# Patient Record
Sex: MALE | Race: WHITE | NOT HISPANIC OR LATINO | Employment: FULL TIME | URBAN - METROPOLITAN AREA
[De-identification: names, ages, dates, MRNs, and addresses within clinical notes are randomized per-mention and may not be internally consistent; named-entity substitution may affect disease eponyms.]

---

## 2017-11-14 ENCOUNTER — TRANSFERRED RECORDS (OUTPATIENT)
Dept: HEALTH INFORMATION MANAGEMENT | Facility: CLINIC | Age: 37
End: 2017-11-14

## 2017-11-20 ENCOUNTER — TRANSFERRED RECORDS (OUTPATIENT)
Dept: HEALTH INFORMATION MANAGEMENT | Facility: CLINIC | Age: 37
End: 2017-11-20

## 2018-05-10 ENCOUNTER — TRANSFERRED RECORDS (OUTPATIENT)
Dept: HEALTH INFORMATION MANAGEMENT | Facility: CLINIC | Age: 38
End: 2018-05-10

## 2018-08-03 ENCOUNTER — MEDICAL CORRESPONDENCE (OUTPATIENT)
Dept: HEALTH INFORMATION MANAGEMENT | Facility: CLINIC | Age: 38
End: 2018-08-03

## 2018-08-30 DIAGNOSIS — M51.369 DDD (DEGENERATIVE DISC DISEASE), LUMBAR: Primary | ICD-10-CM

## 2018-09-05 ENCOUNTER — PRE VISIT (OUTPATIENT)
Dept: ORTHOPEDICS | Facility: CLINIC | Age: 38
End: 2018-09-05

## 2018-09-05 NOTE — TELEPHONE ENCOUNTER
FUTURE VISIT INFORMATION      FUTURE VISIT INFORMATION:    Date: 9/6    Time: 7:00    [place] [facility] [place]  REFERRAL INFORMATION:    Referring provider:  Cassius Mariscal    Referring providers clinic:  [facility]    Reason for visit/diagnosis  chronic lumbar disc disease    RECORDS REQUESTED FROM:       Clinic name Comments Records Status Imaging Status   [facility] Requested 9/5                                     RECORDS STATUS

## 2018-09-06 ENCOUNTER — OFFICE VISIT (OUTPATIENT)
Dept: ORTHOPEDICS | Facility: CLINIC | Age: 38
End: 2018-09-06
Payer: COMMERCIAL

## 2018-09-06 ENCOUNTER — RADIANT APPOINTMENT (OUTPATIENT)
Dept: GENERAL RADIOLOGY | Facility: CLINIC | Age: 38
End: 2018-09-06
Attending: ORTHOPAEDIC SURGERY
Payer: COMMERCIAL

## 2018-09-06 VITALS — WEIGHT: 186 LBS | BODY MASS INDEX: 26.63 KG/M2 | HEIGHT: 70 IN

## 2018-09-06 DIAGNOSIS — M51.369 DDD (DEGENERATIVE DISC DISEASE), LUMBAR: ICD-10-CM

## 2018-09-06 DIAGNOSIS — M47.28 OSTEOARTHRITIS OF SPINE WITH RADICULOPATHY, SACRAL AND SACROCOCCYGEAL REGION: ICD-10-CM

## 2018-09-06 DIAGNOSIS — M51.379 DEGENERATION OF LUMBAR OR LUMBOSACRAL INTERVERTEBRAL DISC: Primary | ICD-10-CM

## 2018-09-06 RX ORDER — HYDROCODONE BITARTRATE AND ACETAMINOPHEN 5; 325 MG/1; MG/1
1-2 TABLET ORAL PRN
COMMUNITY

## 2018-09-06 ASSESSMENT — ENCOUNTER SYMPTOMS
BACK PAIN: 1
JOINT SWELLING: 0
INSOMNIA: 1
NIGHT SWEATS: 0
MYALGIAS: 1
INCREASED ENERGY: 0
POLYPHAGIA: 0
DECREASED CONCENTRATION: 1
CHILLS: 0
NERVOUS/ANXIOUS: 1
FATIGUE: 1
MUSCLE WEAKNESS: 1
ALTERED TEMPERATURE REGULATION: 0
NECK PAIN: 1
MUSCLE CRAMPS: 0
ARTHRALGIAS: 0
STIFFNESS: 0
PANIC: 0
DECREASED APPETITE: 0
POLYDIPSIA: 0
DEPRESSION: 0
WEIGHT GAIN: 0
FEVER: 0
HALLUCINATIONS: 0
WEIGHT LOSS: 0

## 2018-09-06 ASSESSMENT — PATIENT HEALTH QUESTIONNAIRE - PHQ9
SUM OF ALL RESPONSES TO PHQ QUESTIONS 1-9: 8
10. IF YOU CHECKED OFF ANY PROBLEMS, HOW DIFFICULT HAVE THESE PROBLEMS MADE IT FOR YOU TO DO YOUR WORK, TAKE CARE OF THINGS AT HOME, OR GET ALONG WITH OTHER PEOPLE: SOMEWHAT DIFFICULT
SUM OF ALL RESPONSES TO PHQ QUESTIONS 1-9: 8

## 2018-09-06 NOTE — PROGRESS NOTES
REASON FOR CONSULTATION: Consult (Chronic lumbar disc disease.)     REFERRING PHYSICIAN: Cassius Josue   PCP:No Ref-Primary, Physician    History of Present Illness:  37/m, seen for CLBP.  Onset 11 yrs ago; was cutting down trees, hauling logs - felt pop in his back followed by LBP.  Prior to incident, no back issues.  Since then, chronic LBP, never went away.    75% back 25% R leg (posterior thigh, stops at knee).  Also with back stiffness, and limp.    Worse: prolonged positioning, going up stairs, activity.  Better: Prefers to stand and move around rather than sit; changing positions.    Previous treatment:   LEELA's - no relief.  PT (last one 5-6 yrs ago) - no relief.  Chiropractic - no relief.  TENS - no relief.  MBB (some relief) and RFA - no relief.    Currently takes hydrocodone 5/325 mg, 4-5 tabs/day (managed c/o PCP Dr. Josue).  Alleve prn  Voltaren prn    Very unhappy with current sxs.  Willing to consider surgery.  Has not had previous surgical visit because was afraid of surgery.  Limits QOL.  Many activities he used to be able to do that he can't do anymore, although still able to work full time as .  Cannot walk dogs for miles/day like he used to.      Oswestry (LAURYN) Questionnaire    OSWESTRY DISABILITY INDEX 9/6/2018   Count 10   Sum 22   Oswestry Score (%) 44        Visual Analog Pain Scale  Back Pain Scale 0-10: 4  Right leg pain: 0  Left leg pain: 0    PROMIS-10 Scores  Global Mental Health Score: (P) 7  Global Physical Health Score: (P) 12  PROMIS TOTAL - SUBSCORES: (P) 19    ROS:  A 12-point review of systems was completed and is negative except for otherwise noted above in the history of present illness.    Med Hx:  No past medical history on file.    Surg Hx:  No past surgical history on file.    Allergies:  No Known Allergies    Meds:  Current Outpatient Prescriptions   Medication     diclofenac (VOLTAREN) 1 % GEL topical gel     HYDROcodone-acetaminophen (NORCO) 5-325 MG per  "tablet     Naproxen Sodium (ALEVE PO)     No current facility-administered medications for this visit.        Fam Hx:  No family history on file.    P/S Hx:  Social History   Substance Use Topics     Smoking status: Not on file     Smokeless tobacco: Not on file     Alcohol use Not on file     Works full time as ; works thru pain.  , lives with wife; no kids.  Smokes 1 ppd x 25-30 yrs.  EtOH 2x/month, approx 2 beers.      Physical Exam:  Very pleasant, healthy appearing, alert, oriented x 3, cooperative.  Normal mood and affect.  Not in cardiorespiratory distress.  Ht 1.79 m (5' 10.47\")  Wt 84.4 kg (186 lb)  BMI 26.33 kg/m2  Hunched forward posture; stiff upon getting up from chair.  (+) antalgic gait; no assistive device.  Says his limp persists even after he loosens up.  Back: no deformity, no skin lesions or surgical scars.  Localizes pain at R lumbosacral area.  (+) lumbosacral paraspinal tenderness.  (-) PSIS tenderness.  Limited ROM.    Neuro Exam:  Motor: 5/5 strength for all muscle groups in both LE's.  Sensory:  Intact to light touch in both LE's.   Reflexes:  Knee 2+ bilat.  Ankle 1+ bilat.  (-) Babinski, (-) clonus.    Lower Extremity:  Equal leg lengths, full pulses, (-) atrophy / asymmetry.  Full painless passive knee and ankle motion on L.  On R side, doing so produces LBP (but not knee/ankle pain).  Straight leg raise: (+) LBP on right but no leg pain, (-) left.  Hip impingement: (+) LBP on right but no groin pain, (-) left.  ANITA/Mateo's: (++) right, (-) left.      Sacroiliac Joint Tests:      TEST RIGHT LEFT   PSIS tenderness - -   Johanne finger sign - -   ANITA/Mateo ++ -   Thigh thrust + -           Gapping -   Compression -   Sacral thrust -   Gaenslen's -           Imaging:  EOS lumbar ap-lat x-ray today shows some asymmetry between R and L hemipelvis, possibly due to positioning (rotation) but cannot exclude pelvic deformity (old fracture?; however patient denies old " pelvic injury).  No significant lumbar sagittal malalignment, although noted to have pelvic anteversion (negative PT), with hunched forward posture.  Sagittal measurments as follows:  LL 60  L4-S1 37, ideal 37  PI 56  PI-LL mismatch -4  PT -3    Lumbar MRI (Nov 2017) shows degenerative disc changes with disc space narrowing and R sided disc protrusion/HNP L5-S1, with likely impingement of traversing R S1 nerve root.    Impression:   - Degenerative disc disease L5-S1, with chronic discogenic LBP.  - R posterolateral disc herniation L5-S1, with R S1 radiculopathy.  - Chronic smoking (1 ppd x 25-30 yrs).  - Chronic opioid dependence (currently hydrocodone 5/325, ~ 5 tabs/day).      Plan:   Had good long discussion with patient and wife.  Two possible pain generators: (1) L5-S1 disc; (2) R SIJ.  Between the two, the former is more likely, given that there is objective MRI evidence of L5-S1 disc degeneration with R sided disc protrusion and patient's symptoms are predominantly on R side.   However, if he would like to be more certain, discussed further workup in form of discogram and/or R SIJ injection.  Discussed that these tests are not fool-proof and that there is some debate/controversy in regards to their usefulness as well.    Discussed treatment options, including observation/living with his pain, further nonop, and surgery.  Discussed surgical treatment options: L5-S1 fusion, diskectomy, SIJ fusion.  Since his pain is back>leg, and he would be unhappy if only leg symptoms were improved, recommend fusion surgery.    Strongly advised smoking cessation prior to surgery.  Also advised to wean down opioid intake.  He will try to work on those with his PCP Dr. Josue.    - Surg request placed: ALIF L5-S1; Perc pedicle screw fixation L5-S1; MIS R diskectomy L5-S1.  - PAC  - Will need vascular surgeon for Part 1.    RTC 1-2 wks prior to surgery, with full spine EOS ap-lat; lumbar CT (for preop planning; order placed),  and will enroll in DegenPro study (c/o Fide Madrid ).    All questions and concerns were answered to the patient's apparent satisfaction before leaving the clinic.     Total visit time > 60 mins, > 50% counseling and coordination of care.    Respectfully,    Charlie Nixon MD    Orthopaedic Spine Surgery  Dept Orthopaedic Surgery, [facility]  [phone] office, [phone] pager  [email or web address]    Answers for HPI/ROS submitted by the patient on 9/6/2018   General Symptoms: Yes  Skin Symptoms: No  HENT Symptoms: No  EYE SYMPTOMS: No  HEART SYMPTOMS: No  LUNG SYMPTOMS: No  INTESTINAL SYMPTOMS: No  URINARY SYMPTOMS: No  REPRODUCTIVE SYMPTOMS: No  SKELETAL SYMPTOMS: Yes  BLOOD SYMPTOMS: No  NERVOUS SYSTEM SYMPTOMS: No  MENTAL HEALTH SYMPTOMS: Yes  Fever: No  Loss of appetite: No  Weight loss: No  Weight gain: No  Fatigue: Yes  Night sweats: No  Chills: No  Increased stress: Yes  Excessive hunger: No  Excessive thirst: No  Feeling hot or cold when others believe the temperature is normal: No  Loss of height: No  Post-operative complications: No  Surgical site pain: No  Hallucinations: No  Change in or Loss of Energy: No  Hyperactivity: No  Confusion: No  Back pain: Yes  Muscle aches: Yes  Neck pain: Yes  Swollen joints: No  Joint pain: No  Bone pain: No  Muscle cramps: No  Muscle weakness: Yes  Joint stiffness: No  Bone fracture: No  Nervous or Anxious: Yes  Depression: No  Trouble sleeping: Yes  Trouble thinking or concentrating: Yes  Mood changes: Yes  Panic attacks: No  PHQ-2 Score: 4  If you checked off any problems, how difficult have these problems made it for you to do your work, take care of things at home, or get along with other people?: Somewhat difficult  PHQ9 TOTAL SCORE: 8

## 2018-09-06 NOTE — LETTER
Verification of Appointment  2018     Seen today: yes    Patient:  Lee Mckenzie  :   1980  MRN:     [id number]  Physician: GENARO KHALIL      Patient was seen today at Orthopaedic Spine Surgery clinic for chronic low back pain.        Electronically signed by Genaro Khalli MD

## 2018-09-06 NOTE — NURSING NOTE
"Reason For Visit:   Chief Complaint   Patient presents with     Consult     Chronic lumbar disc disease.       Primary MD: No Ref-Primary, Physician      ?  No  Occupation   Currently working? Yes.  Work status?  Full time.  Date of injury: None.  Date of surgery:None  Smoker: Yes    Ht 1.79 m (5' 10.47\")  Wt 84.4 kg (186 lb)  BMI 26.33 kg/m2    Pain Assessment  Patient Currently in Pain: Yes  0-10 Pain Scale: 5  Primary Pain Location: Back  Pain Descriptors: Constant, Aching    Oswestry (LAURYN) Questionnaire    OSWESTRY DISABILITY INDEX 9/6/2018   Count 10   Sum 22   Oswestry Score (%) 44            Visual Analog Pain Scale  Back Pain Scale 0-10: 4  Right leg pain: 0  Left leg pain: 0    Promis 10 Assessment    PROMIS 10 9/6/2018   In general, would you say your health is: Good   In general, would you say your quality of life is: Poor   In general, how would you rate your physical health? Good   In general, how would you rate your mental health, including your mood and your ability to think? Fair   In general, how would you rate your satisfaction with your social activities and relationships? Fair   In general, please rate how well you carry out your usual social activities and roles Good   To what extent are you able to carry out your everyday physical activities such as walking, climbing stairs, carrying groceries, or moving a chair? Moderately   How often have you been bothered by emotional problems such as feeling anxious, depressed or irritable? Often   How would you rate your fatigue on average? Moderate   How would you rate your pain on average?   0 = No Pain  to  10 = Worst Imaginable Pain 6   In general, would you say your health is: 3   In general, would you say your quality of life is: 1   In general, how would you rate your physical health? 3   In general, how would you rate your mental health, including your mood and your ability to think? 2   In general, how would you rate your " satisfaction with your social activities and relationships? 2   In general, please rate how well you carry out your usual social activities and roles. (This includes activities at home, at work and in your community, and responsibilities as a parent, child, spouse, employee, friend, etc.) 3   To what extent are you able to carry out your everyday physical activities such as walking, climbing stairs, carrying groceries, or moving a chair? 3   In the past 7 days, how often have you been bothered by emotional problems such as feeling anxious, depressed, or irritable? 4   In the past 7 days, how would you rate your fatigue on average? 3   In the past 7 days, how would you rate your pain on average, where 0 means no pain, and 10 means worst imaginable pain? 6   Global Mental Health Score 7   Global Physical Health Score 12   PROMIS TOTAL - SUBSCORES 19                Mirna Vidales LPN

## 2018-09-06 NOTE — MR AVS SNAPSHOT
After Visit Summary   9/6/2018    Lee Mckenzie    MRN: [id number]           Patient Information     Date Of Birth          1980        Visit Information        Provider Department      9/6/2018 7:00 AM Charlie Nixon [facility]        Today's Diagnoses     Degeneration of lumbar or lumbosacral intervertebral disc    -  1    Osteoarthritis of spine with radiculopathy, sacral and sacrococcygeal region           Follow-ups after your visit        Additional Services     PAC Visit Referral (For [facility] Only)       Does this visit require an Anesthesia consult?    Will be scheduled for AP fusion L5-S1.  Chronic smoker 1 ppd x 25-30 yrs.  Hydrocodone 5/325 mg, 5 tabs/day x > 2 yrs.    H&P done by:  N/A and PAC      Please be aware that coverage of these services is subject to the terms and limitations of your health insurance plan.  Call member services at your health plan with any benefit or coverage questions.      Please bring the following to your appointment:  >>   Any x-rays, CTs or MRIs which have been performed.  Contact the facility where they were done to arrange for  prior to your scheduled appointment.  Any new CT, MRI or other procedures ordered by your specialist must be performed at a [place] facility or coordinated by your clinic's referral office.    >>   List of current medications  >>   This referral request   >>   Any documents/labs given to you for this referral                  Future tests that were ordered for you today     Open Future Orders        Priority Expected Expires Ordered    CT lumbar spine without contrast Routine  9/6/2019 9/6/2018            Who to contact     Please call your clinic at [phone] to:    Ask questions about your health    Make or cancel appointments    Discuss your medicines    Learn about your test results    Speak to your doctor            Additional Information About Your Visit        MyChart Information   "   Topio is an electronic gateway that provides easy, online access to your medical records. With Topio, you can request a clinic appointment, read your test results, renew a prescription or communicate with your care team.     To sign up for Topio visit the website at [email or web address]   You will be asked to enter the access code listed below, as well as some personal information. Please follow the directions to create your username and password.     Your access code is: [email or web address]  Expires: 2018  6:31 AM     Your access code will  in 90 days. If you need help or a new code, please contact your [facility] or call [phone] for assistance.        Care EveryWhere ID     This is your Care EveryWhere ID. This could be used by other organizations to access your [facility] medical records  [id number]        Your Vitals Were     Height BMI (Body Mass Index)                1.79 m (5' 10.47\") 26.33 kg/m2           Blood Pressure from Last 3 Encounters:   No data found for BP    Weight from Last 3 Encounters:   18 84.4 kg (186 lb)              We Performed the Following     PAC Visit Referral (For [facility] Only)     Vesta-Operative Worksheet       Information about OPIOIDS     PRESCRIPTION OPIOIDS: WHAT YOU NEED TO KNOW   We gave you an opioid (narcotic) pain medicine. It is important to manage your pain, but opioids are not always the best choice. You should first try all the other options your care team gave you. Take this medicine for as short a time (and as few doses) as possible.    Some activities can increase your pain, such as bandage changes or therapy sessions. It may help to take your pain medicine 30 to 60 minutes before these activities. Reduce your stress by getting enough sleep, working on hobbies you enjoy and practicing relaxation or meditation. Talk to your care team about ways to manage your pain beyond prescription opioids.    These " medicines have risks:    DO NOT drive when on new or higher doses of pain medicine. These medicines can affect your alertness and reaction times, and you could be arrested for driving under the influence (DUI). If you need to use opioids long-term, talk to your care team about driving.    DO NOT operate heavy machinery    DO NOT do any other dangerous activities while taking these medicines.    DO NOT drink any alcohol while taking these medicines.     If the opioid prescribed includes acetaminophen, DO NOT take with any other medicines that contain acetaminophen. Read all labels carefully. Look for the word  acetaminophen  or  Tylenol.  Ask your pharmacist if you have questions or are unsure.    You can get addicted to pain medicines, especially if you have a history of addiction (chemical, alcohol or substance dependence). Talk to your care team about ways to reduce this risk.    All opioids tend to cause constipation. Drink plenty of water and eat foods that have a lot of fiber, such as fruits, vegetables, prune juice, apple juice and high-fiber cereal. Take a laxative (Miralax, milk of magnesia, Colace, Senna) if you don t move your bowels at least every other day. Other side effects include upset stomach, sleepiness, dizziness, throwing up, tolerance (needing more of the medicine to have the same effect), physical dependence and slowed breathing.    Store your pills in a secure place, locked if possible. We will not replace any lost or stolen medicine. If you don t finish your medicine, please throw away (dispose) as directed by your pharmacist. The [place] Pollution Control Agency has more information about safe disposal: [email or web address]         Primary Care Provider Fax #    Physician No Ref-Primary [phone]       No address on file        Equal Access to Services     THUY CEJA AH: bryant López qaybta kaalmada  velasquez oroscoelislidia la'aaeula ah. Yessi [facility] [phone].    ATENCIÓN: Si habla erika, tiene a gilliam disposición servicios gratuitos de asistencia lingüística. Jim al [phone].    We comply with applicable federal civil rights laws and [place] laws. We do not discriminate on the basis of race, color, national origin, age, disability, sex, sexual orientation, or gender identity.            Thank you!     Thank you for choosing [facility]  for your care. Our goal is always to provide you with excellent care. Hearing back from our patients is one way we can continue to improve our services. Please take a few minutes to complete the written survey that you may receive in the mail after your visit with us. Thank you!             Your Updated Medication List - Protect others around you: Learn how to safely use, store and throw away your medicines at www.disposemymeds.org.          This list is accurate as of 9/6/18  9:45 AM.  Always use your most recent med list.                   Brand Name Dispense Instructions for use Diagnosis    ALEVE PO           diclofenac 1 % Gel topical gel    VOLTAREN     Place onto the skin 4 times daily        HYDROcodone-acetaminophen 5-325 MG per tablet    NORCO     Take 1 tablet by mouth every 6 hours as needed for severe pain

## 2018-09-06 NOTE — LETTER
9/6/2018       RE: Lee Mckenzie  [address] [facility] [address]     Dear Colleague,    Thank you for referring your patient, Lee Mckenzie, to the [facility]. Please see a copy of my visit note below.    REASON FOR CONSULTATION: Consult (Chronic lumbar disc disease.)     REFERRING PHYSICIAN: Cassius Josue   PCP:No Ref-Primary, Physician    History of Present Illness:  37/m, seen for CLBP.  Onset 11 yrs ago; was cutting down trees, hauling logs - felt pop in his back followed by LBP.  Prior to incident, no back issues.  Since then, chronic LBP, never went away.    75% back 25% R leg (posterior thigh, stops at knee).  Also with back stiffness, and limp.    Worse: prolonged positioning, going up stairs, activity.  Better: Prefers to stand and move around rather than sit; changing positions.    Previous treatment:   LEELA's - no relief.  PT (last one 5-6 yrs ago) - no relief.  Chiropractic - no relief.  TENS - no relief.  MBB (some relief) and RFA - no relief.    Currently takes hydrocodone 5/325 mg, 4-5 tabs/day (managed c/o PCP Dr. Josue).  Alleve prn  Voltaren prn    Very unhappy with current sxs.  Willing to consider surgery.  Has not had previous surgical visit because was afraid of surgery.  Limits QOL.  Many activities he used to be able to do that he can't do anymore, although still able to work full time as .  Cannot walk dogs for miles/day like he used to.      Oswestry (LAURYN) Questionnaire    OSWESTRY DISABILITY INDEX 9/6/2018   Count 10   Sum 22   Oswestry Score (%) 44        Visual Analog Pain Scale  Back Pain Scale 0-10: 4  Right leg pain: 0  Left leg pain: 0    PROMIS-10 Scores  Global Mental Health Score: (P) 7  Global Physical Health Score: (P) 12  PROMIS TOTAL - SUBSCORES: (P) 19    ROS:  A 12-point review of systems was completed and is negative except for otherwise noted above in the history of present  "illness.    Med Hx:  No past medical history on file.    Surg Hx:  No past surgical history on file.    Allergies:  No Known Allergies    Meds:  Current Outpatient Prescriptions   Medication     diclofenac (VOLTAREN) 1 % GEL topical gel     HYDROcodone-acetaminophen (NORCO) 5-325 MG per tablet     Naproxen Sodium (ALEVE PO)     No current facility-administered medications for this visit.        Fam Hx:  No family history on file.    P/S Hx:  Social History   Substance Use Topics     Smoking status: Not on file     Smokeless tobacco: Not on file     Alcohol use Not on file     Works full time as ; works thru pain.  , lives with wife; no kids.  Smokes 1 ppd x 25-30 yrs.  EtOH 2x/month, approx 2 beers.      Physical Exam:  Very pleasant, healthy appearing, alert, oriented x 3, cooperative.  Normal mood and affect.  Not in cardiorespiratory distress.  Ht 1.79 m (5' 10.47\")  Wt 84.4 kg (186 lb)  BMI 26.33 kg/m2  Hunched forward posture; stiff upon getting up from chair.  (+) antalgic gait; no assistive device.  Says his limp persists even after he loosens up.  Back: no deformity, no skin lesions or surgical scars.  Localizes pain at R lumbosacral area.  (+) lumbosacral paraspinal tenderness.  (-) PSIS tenderness.  Limited ROM.    Neuro Exam:  Motor: 5/5 strength for all muscle groups in both LE's.  Sensory:  Intact to light touch in both LE's.   Reflexes:  Knee 2+ bilat.  Ankle 1+ bilat.  (-) Babinski, (-) clonus.    Lower Extremity:  Equal leg lengths, full pulses, (-) atrophy / asymmetry.  Full painless passive knee and ankle motion on L.  On R side, doing so produces LBP (but not knee/ankle pain).  Straight leg raise: (+) LBP on right but no leg pain, (-) left.  Hip impingement: (+) LBP on right but no groin pain, (-) left.  ANITA/Mateo's: (++) right, (-) left.      Sacroiliac Joint Tests:      TEST RIGHT LEFT   PSIS tenderness - -   Johanne finger sign - -   ANITA/Mateo ++ -   Thigh thrust + " -           Gapping -   Compression -   Sacral thrust -   Gaenslen's -           Imaging:  EOS lumbar ap-lat x-ray today shows some asymmetry between R and L hemipelvis, possibly due to positioning (rotation) but cannot exclude pelvic deformity (old fracture?; however patient denies old pelvic injury).  No significant lumbar sagittal malalignment, although noted to have pelvic anteversion (negative PT), with hunched forward posture.  Sagittal measurments as follows:  LL 60  L4-S1 37, ideal 37  PI 56  PI-LL mismatch -4  PT -3    Lumbar MRI (Nov 2017) shows degenerative disc changes with disc space narrowing and R sided disc protrusion/HNP L5-S1, with likely impingement of traversing R S1 nerve root.    Impression:   - Degenerative disc disease L5-S1, with chronic discogenic LBP.  - R posterolateral disc herniation L5-S1, with R S1 radiculopathy.  - Chronic smoking (1 ppd x 25-30 yrs).  - Chronic opioid dependence (currently hydrocodone 5/325, ~ 5 tabs/day).      Plan:   Had good long discussion with patient and wife.  Two possible pain generators: (1) L5-S1 disc; (2) R SIJ.  Between the two, the former is more likely, given that there is objective MRI evidence of L5-S1 disc degeneration with R sided disc protrusion and patient's symptoms are predominantly on R side.   However, if he would like to be more certain, discussed further workup in form of discogram and/or R SIJ injection.  Discussed that these tests are not fool-proof and that there is some debate/controversy in regards to their usefulness as well.    Discussed treatment options, including observation/living with his pain, further nonop, and surgery.  Discussed surgical treatment options: L5-S1 fusion, diskectomy, SIJ fusion.  Since his pain is back>leg, and he would be unhappy if only leg symptoms were improved, recommend fusion surgery.    Strongly advised smoking cessation prior to surgery.  Also advised to wean down opioid intake.  He will try to work  on those with his PCP Dr. Josue.    - Surg request placed: ALIF L5-S1; Perc pedicle screw fixation L5-S1; MIS R diskectomy L5-S1.  - PAC  - Will need vascular surgeon for Part 1.    RTC 1-2 wks prior to surgery, with full spine EOS ap-lat; lumbar CT (for preop planning; order placed), and will enroll in DegenPro study (c/o Fide [place] ).    All questions and concerns were answered to the patient's apparent satisfaction before leaving the clinic.     Total visit time > 60 mins, > 50% counseling and coordination of care.    Respectfully,    Charlie Nixon MD    Orthopaedic Spine Surgery  Dept Orthopaedic Surgery, [facility]  [phone] office, [phone] pager  [email or web address]

## 2018-09-07 ASSESSMENT — PATIENT HEALTH QUESTIONNAIRE - PHQ9: SUM OF ALL RESPONSES TO PHQ QUESTIONS 1-9: 8

## 2018-09-17 ENCOUNTER — ANESTHESIA EVENT (OUTPATIENT)
Dept: SURGERY | Facility: CLINIC | Age: 38
End: 2018-09-17

## 2018-09-17 ENCOUNTER — OFFICE VISIT (OUTPATIENT)
Dept: SURGERY | Facility: CLINIC | Age: 38
End: 2018-09-17
Payer: COMMERCIAL

## 2018-09-17 VITALS
HEIGHT: 70 IN | HEART RATE: 57 BPM | BODY MASS INDEX: 25.97 KG/M2 | OXYGEN SATURATION: 99 % | TEMPERATURE: 98.2 F | SYSTOLIC BLOOD PRESSURE: 117 MMHG | RESPIRATION RATE: 18 BRPM | DIASTOLIC BLOOD PRESSURE: 74 MMHG | WEIGHT: 181.4 LBS

## 2018-09-17 DIAGNOSIS — Z01.818 PRE-OP EXAMINATION: Primary | ICD-10-CM

## 2018-09-17 DIAGNOSIS — M51.379 DEGENERATION OF LUMBAR OR LUMBOSACRAL INTERVERTEBRAL DISC: ICD-10-CM

## 2018-09-17 LAB
ABO + RH BLD: NORMAL
ABO + RH BLD: NORMAL
ANION GAP SERPL CALCULATED.3IONS-SCNC: 6 MMOL/L (ref 3–14)
BLD GP AB SCN SERPL QL: NORMAL
BLOOD BANK CMNT PATIENT-IMP: NORMAL
BLOOD BANK CMNT PATIENT-IMP: NORMAL
BUN SERPL-MCNC: 14 MG/DL (ref 7–30)
CALCIUM SERPL-MCNC: 9.2 MG/DL (ref 8.5–10.1)
CHLORIDE SERPL-SCNC: 107 MMOL/L (ref 94–109)
CO2 SERPL-SCNC: 27 MMOL/L (ref 20–32)
CREAT SERPL-MCNC: 0.92 MG/DL (ref 0.66–1.25)
ERYTHROCYTE [DISTWIDTH] IN BLOOD BY AUTOMATED COUNT: 12.6 % (ref 10–15)
GFR SERPL CREATININE-BSD FRML MDRD: >90 ML/MIN/1.7M2
GLUCOSE SERPL-MCNC: 98 MG/DL (ref 70–99)
HCT VFR BLD AUTO: 44.3 % (ref 40–53)
HGB BLD-MCNC: 15.3 G/DL (ref 13.3–17.7)
INR PPP: 0.94 (ref 0.86–1.14)
MCH RBC QN AUTO: 30.8 PG (ref 26.5–33)
MCHC RBC AUTO-ENTMCNC: 34.5 G/DL (ref 31.5–36.5)
MCV RBC AUTO: 89 FL (ref 78–100)
MRSA DNA SPEC QL NAA+PROBE: NEGATIVE
NT-PROBNP SERPL-MCNC: 44 PG/ML (ref 0–125)
PLATELET # BLD AUTO: 259 10E9/L (ref 150–450)
POTASSIUM SERPL-SCNC: 4.1 MMOL/L (ref 3.4–5.3)
RBC # BLD AUTO: 4.97 10E12/L (ref 4.4–5.9)
SODIUM SERPL-SCNC: 140 MMOL/L (ref 133–144)
SPECIMEN EXP DATE BLD: NORMAL
SPECIMEN SOURCE: NORMAL
WBC # BLD AUTO: 10.9 10E9/L (ref 4–11)

## 2018-09-17 ASSESSMENT — LIFESTYLE VARIABLES: TOBACCO_USE: 1

## 2018-09-17 NOTE — PATIENT INSTRUCTIONS
Preparing for Your Surgery      Name:  Lee Mckenzie   MRN:  [id number]   :  1980   Today's Date:  2018     Arriving for surgery:  No surgery date. Pre-Admissions will call you to confirm surgery date/arrival time/and location.  Surgery date:    Arrival time:      Please come to:    -  Bring your ID and insurance card.    What can I eat or drink?  -  You may have solid food or milk products until 8 hours prior to your surgery.   -  You may have water, apple juice or 7up/Sprite until 2 hours prior to your surgery.     Which medicines can I take?       -  Do not bring your own medications to the hospital.        -  Follow Orthopedic Clinic instructions regarding Ibuprofen. If no instructions given, NO Ibuprofen the day prior to surgery.         -  Hold Naproxen 2 days prior to surgery.        -  Hold Diclofenac gel 1 day prior to surgery.    -  Please take these medications the morning of surgery:  Hydrocodone-Acetaminophen if needed    How do I prepare myself?  -  Take two showers: one the night before surgery; and one the morning of surgery.         Use Scrubcare or Hibiclens to wash from neck down.  You may use your own shampoo and conditioner. No other hair products.   -  Do NOT use lotion, powder, colognes, deodorant, or antiperspirant the day of your surgery.  -  Do NOT wear any jewelry.        -  Begin using Incentive Spirometer 1 week prior to surgery. Use 4 times a day, 5-10 breaths each time. Bring Incentive Spirometer to hospital.    Questions or Concerns:  If you have questions or concerns prior to your surgery, call [phone]. (Mon - Fri   8 am- 5:30 pm)  Questions after surgery, contact your Surgeons office.      AFTER YOUR SURGERY  Breathing exercises   Breathing exercises help you recover faster. Take deep breaths and let the air out slowly. This will:     Help you wake up after surgery.    Help prevent complications like pneumonia.  Preventing complications will help you go home  sooner.   We may give you a breathing device (incentive spirometer) to encourage you to breathe deeply.   Nausea and vomiting   You may feel sick to your stomach after surgery; if so, let your nurse know.    Pain control:  After surgery, you may have pain. Our goal is to help you manage your pain. Pain medicine will help you feel comfortable enough to do activities that will help you heal.  These activities may include breathing exercises, walking and physical therapy.   To help your health care team treat your pain we will ask: 1) If you have pain  2) where it is located 3) describe your pain in your words  Methods of pain control include medications given by mouth, vein or by nerve block for some surgeries.  We may give you a pain control pump that will:  1) Deliver the medicine through a tube placed in your vein  2) Control the amount of medicine you receive  3) Allow you to push a button to deliver a dose of pain medicine  Sequential Compression Device (SCD) or Pneumo Boots:  You may need to wear SCD S on your legs or feet. These are wraps connected to a machine that pumps in air and releases it. The repeated pumping helps prevent blood clots from forming.   Using an Incentive Spirometer    An incentive spirometer is a device that helps you do deep breathing exercises. These exercises expand your lungs, aid in circulation, and help prevent pneumonia. Deep breathing exercises also help you breathe better and improve the function of your lungs by:    Keeping your lungs clear    Strengthening your breathing muscles    Helping prevent respiratory complications or problems  The incentive spirometer gives you a way to take an active part in your care. A nurse or therapist will teach you breathing exercises. To do these exercises, you will breathe in through your mouth and not your nose. The incentive spirometer only works correctly if you breathe in through your mouth.    Steps to clear lungs  Step 1. Exhale normally.  Then, inhale normally.    Relax and breathe out.  Step 2. Place your lips tightly around the mouthpiece.    Make sure the device is upright and not tilted.  Step 3. Inhale as much air as you can through the mouthpiece (don't breath through your nose).    Inhale slowly and deeply.    Hold your breath long enough to keep the balls or disk raised for at least 3 to 5 seconds, or as instructed by your healthcare provider.  Step 4. Repeat the exercise regularly.

## 2018-09-17 NOTE — MR AVS SNAPSHOT
After Visit Summary   2018    Lee Mckenzie    MRN: [id number]           Patient Information     Date Of Birth          1980        Visit Information        Provider Department      2018 8:30 AM Alina Meraz, APRN CNP [facility]        Today's Diagnoses     Degeneration of lumbar or lumbosacral intervertebral disc    -  1      Care Instructions    Preparing for Your Surgery      Name:  Lee Mckenzie   MRN:  [id number]   :  1980   Today's Date:  2018     Arriving for surgery:  No surgery date. Pre-Admissions will call you to confirm surgery date/arrival time/and location.  Surgery date:    Arrival time:      Please come to:    -  Bring your ID and insurance card.    What can I eat or drink?  -  You may have solid food or milk products until 8 hours prior to your surgery.   -  You may have water, apple juice or 7up/Sprite until 2 hours prior to your surgery.     Which medicines can I take?       -  Do not bring your own medications to the hospital.        -  Follow Orthopedic Clinic instructions regarding Ibuprofen. If no instructions given, NO Ibuprofen the day prior to surgery.         -  Hold Naproxen 2 days prior to surgery.        -  Hold Diclofenac gel 1 day prior to surgery.    -  Please take these medications the morning of surgery:  Hydrocodone-Acetaminophen if needed    How do I prepare myself?  -  Take two showers: one the night before surgery; and one the morning of surgery.         Use Scrubcare or Hibiclens to wash from neck down.  You may use your own shampoo and conditioner. No other hair products.   -  Do NOT use lotion, powder, colognes, deodorant, or antiperspirant the day of your surgery.  -  Do NOT wear any jewelry.        -  Begin using Incentive Spirometer 1 week prior to surgery. Use 4 times a day, 5-10 breaths each time. Bring Incentive Spirometer to hospital.    Questions or Concerns:  If you have questions or  concerns prior to your surgery, call [phone]. (Mon - Fri   8 am- 5:30 pm)  Questions after surgery, contact your Surgeons office.      AFTER YOUR SURGERY  Breathing exercises   Breathing exercises help you recover faster. Take deep breaths and let the air out slowly. This will:     Help you wake up after surgery.    Help prevent complications like pneumonia.  Preventing complications will help you go home sooner.   We may give you a breathing device (incentive spirometer) to encourage you to breathe deeply.   Nausea and vomiting   You may feel sick to your stomach after surgery; if so, let your nurse know.    Pain control:  After surgery, you may have pain. Our goal is to help you manage your pain. Pain medicine will help you feel comfortable enough to do activities that will help you heal.  These activities may include breathing exercises, walking and physical therapy.   To help your health care team treat your pain we will ask: 1) If you have pain  2) where it is located 3) describe your pain in your words  Methods of pain control include medications given by mouth, vein or by nerve block for some surgeries.  We may give you a pain control pump that will:  1) Deliver the medicine through a tube placed in your vein  2) Control the amount of medicine you receive  3) Allow you to push a button to deliver a dose of pain medicine  Sequential Compression Device (SCD) or Pneumo Boots:  You may need to wear SCD S on your legs or feet. These are wraps connected to a machine that pumps in air and releases it. The repeated pumping helps prevent blood clots from forming.   Using an Incentive Spirometer    An incentive spirometer is a device that helps you do deep breathing exercises. These exercises expand your lungs, aid in circulation, and help prevent pneumonia. Deep breathing exercises also help you breathe better and improve the function of your lungs by:    Keeping your lungs clear    Strengthening your breathing  muscles    Helping prevent respiratory complications or problems  The incentive spirometer gives you a way to take an active part in your care. A nurse or therapist will teach you breathing exercises. To do these exercises, you will breathe in through your mouth and not your nose. The incentive spirometer only works correctly if you breathe in through your mouth.    Steps to clear lungs  Step 1. Exhale normally. Then, inhale normally.    Relax and breathe out.  Step 2. Place your lips tightly around the mouthpiece.    Make sure the device is upright and not tilted.  Step 3. Inhale as much air as you can through the mouthpiece (don't breath through your nose).    Inhale slowly and deeply.    Hold your breath long enough to keep the balls or disk raised for at least 3 to 5 seconds, or as instructed by your healthcare provider.  Step 4. Repeat the exercise regularly.                        Follow-ups after your visit        Future tests that were ordered for you today     Open Future Orders        Priority Expected Expires Ordered    N terminal pro BNP outpatient Routine  9/18/2019 9/17/2018    Basic metabolic panel Routine 9/17/2018 10/17/2018 9/17/2018    CBC with platelets Routine 9/17/2018 10/17/2018 9/17/2018    INR Routine 9/17/2018 10/17/2018 9/17/2018    UA reflex to Microscopic and Culture Routine 9/17/2018 10/17/2018 9/17/2018    Methicillin Resistant Staph Aureus PCR Routine  9/18/2019 9/17/2018    ABO/Rh type and screen Routine 9/17/2018 10/17/2018 9/17/2018            Who to contact     Please call your clinic at [phone] to:    Ask questions about your health    Make or cancel appointments    Discuss your medicines    Learn about your test results    Speak to your doctor            Additional Information About Your Visit        [employer] Information     [employer] is an electronic gateway that provides easy, online access to your medical records. With [employer], you can request a clinic appointment, read  "your test results, renew a prescription or communicate with your care team.     To sign up for [employer] visit the website at [email or web address]   You will be asked to enter the access code listed below, as well as some personal information. Please follow the directions to create your username and password.     Your access code is: [email or web address]  Expires: 2018  6:31 AM     Your access code will  in 90 days. If you need help or a new code, please contact your [facility] or call [phone] for assistance.        Care EveryWhere ID     This is your Care EveryWhere ID. This could be used by other organizations to access your [facility] medical records  [id number]        Your Vitals Were     Pulse Temperature Respirations Height Pulse Oximetry BMI (Body Mass Index)    57 98.2  F (36.8  C) (Oral) 18 1.778 m (5' 10\") 99% 26.03 kg/m2       Blood Pressure from Last 3 Encounters:   18 117/74    Weight from Last 3 Encounters:   18 82.3 kg (181 lb 6.4 oz)   18 84.4 kg (186 lb)               Primary Care Provider Fax #    Physician No Ref-Primary [phone]       No address on file        Equal Access to Services     THUY CEJA : Hadii aad ku hadasho Soomaali, waaxda luqadaha, qaybta kaalmada adeegyada, velasquez coronel . So [facility] [phone].    ATENCIÓN: Si habla español, tiene a gilliam disposición servicios gratuitos de asistencia lingüística. Llame al [phone].    We comply with applicable federal civil rights laws and [place] laws. We do not discriminate on the basis of race, color, national origin, age, disability, sex, sexual orientation, or gender identity.            Thank you!     Thank you for choosing [facility]  for your care. Our goal is always to provide you with excellent care. Hearing back from our patients is one way we can continue to improve our services. Please take a few minutes to " complete the written survey that you may receive in the mail after your visit with us. Thank you!             Your Updated Medication List - Protect others around you: Learn how to safely use, store and throw away your medicines at www.disposemymeds.org.          This list is accurate as of 9/17/18  9:40 AM.  Always use your most recent med list.                   Brand Name Dispense Instructions for use Diagnosis    ALEVE PO      Take 220-440 mg by mouth as needed        diclofenac 1 % Gel topical gel    VOLTAREN     Place onto the skin as needed        HYDROcodone-acetaminophen 5-325 MG per tablet    NORCO     Take 1-2 tablets by mouth as needed for severe pain
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
## 2018-10-08 ENCOUNTER — OFFICE VISIT (OUTPATIENT)
Dept: SURGERY | Facility: CLINIC | Age: 38
End: 2018-10-08
Payer: COMMERCIAL

## 2018-10-08 ENCOUNTER — ANESTHESIA EVENT (OUTPATIENT)
Dept: SURGERY | Facility: CLINIC | Age: 38
End: 2018-10-08

## 2018-10-08 VITALS
OXYGEN SATURATION: 97 % | DIASTOLIC BLOOD PRESSURE: 49 MMHG | SYSTOLIC BLOOD PRESSURE: 117 MMHG | TEMPERATURE: 98.5 F | HEIGHT: 70 IN | HEART RATE: 57 BPM | WEIGHT: 184.5 LBS | BODY MASS INDEX: 26.41 KG/M2 | RESPIRATION RATE: 18 BRPM

## 2018-10-08 DIAGNOSIS — Z01.818 PRE-OP EVALUATION: Primary | ICD-10-CM

## 2018-10-08 DIAGNOSIS — M51.379 DEGENERATION OF LUMBAR OR LUMBOSACRAL INTERVERTEBRAL DISC: ICD-10-CM

## 2018-10-08 DIAGNOSIS — M47.27 OSTEOARTHRITIS OF LUMBOSACRAL SPINE WITH RADICULOPATHY: ICD-10-CM

## 2018-10-08 LAB
ALBUMIN UR-MCNC: NEGATIVE MG/DL
AMPHETAMINES UR QL SCN: NEGATIVE
APPEARANCE UR: CLEAR
BARBITURATES UR QL: NEGATIVE
BENZODIAZ UR QL: NEGATIVE
BILIRUB UR QL STRIP: NEGATIVE
CANNABINOIDS UR QL SCN: POSITIVE
COCAINE UR QL: NEGATIVE
COLOR UR AUTO: YELLOW
ETHANOL UR QL SCN: NEGATIVE
GLUCOSE UR STRIP-MCNC: NEGATIVE MG/DL
HGB UR QL STRIP: NEGATIVE
INR PPP: 0.96 (ref 0.86–1.14)
KETONES UR STRIP-MCNC: NEGATIVE MG/DL
LEUKOCYTE ESTERASE UR QL STRIP: NEGATIVE
NITRATE UR QL: NEGATIVE
OPIATES UR QL SCN: POSITIVE
PH UR STRIP: 5 PH (ref 5–7)
SOURCE: NORMAL
SP GR UR STRIP: 1.02 (ref 1–1.03)
UROBILINOGEN UR STRIP-MCNC: 0 MG/DL (ref 0–2)

## 2018-10-08 ASSESSMENT — LIFESTYLE VARIABLES: TOBACCO_USE: 1
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
## 2018-10-08 NOTE — LETTER
[facility] PREOPERATIVE ASSESSMENT CENTER  [address]  [address] [place] [address]          October 8, 2018    Lee Mckenzie                                                                                                                     [address] [address]          To Whom It May Concern:    Please excuse Mr. Lee Mckenzie for 9/17/18 and 10/8/2018 for medical appointments.          Sincerely,         Alina Meraz APRN, MSN, CNP

## 2018-10-08 NOTE — PATIENT INSTRUCTIONS
Preparing for Your Surgery      Name:  Lee Mckenzie   MRN:  [id number]   :  1980   Today's Date:  10/8/2018     Arriving for surgery:  Lumbar Fusion   Surgery date:  2018  Arrival time:  5:30 am  Please come to:     [facility] [address]  [address] [place]  [address]    - parking is available in front of [facility] from 5:15AM to 8:00PM. If you prefer, park your car in the Green Lot.    -Proceed to the 3rd floor, check in at the Adult Surgery Waiting Lounge. [phone]    If an escort is needed stop at the Information Desk in the lobby. Inform the information person that you are here for surgery. An escort to the Adult Surgery Waiting Lounge will be provided.        What can I eat or drink?  -  You may have solid food or milk products until 8 hours prior to your surgery. (18 at 11 pm)  -  You may have water, apple juice or 7up/Sprite until 2 hours prior to your surgery.( until 5 am)    Which medicines can I take?         Stop Aspirin, vitamins and supplements one week prior to surgery.       Hold Ibuprofen and Naproxen for 24 hours prior to surgery.       -  Please take these medications the day of surgery:     Hydrocodone if needed       How do I prepare myself?  -  Take two showers: one the night before surgery; and one the morning of surgery.         Use Scrubcare or Hibiclens to wash from neck down.  You may use your own shampoo and conditioner. No other hair products.   -  Do NOT use lotion, powder, deodorant, or antiperspirant the day of your surgery.  -  Do NOT wear any makeup, fingernail polish or jewelry.  - Do not bring your own medications to the hospital, except for inhalers and eye drops.  -  Bring your ID and insurance card.    Questions or Concerns:  -If you have questions or concerns regarding the day of surgery, please call [phone].     -If you are scheduled at the Ambulatory Surgery Center please call  [phone].    -For questions after surgery please call your surgeons office.           AFTER YOUR SURGERY  Breathing exercises   Breathing exercises help you recover faster. Take deep breaths and let the air out slowly. This will:     Help you wake up after surgery.    Help prevent complications like pneumonia.  Preventing complications will help you go home sooner.   We may give you a breathing device (incentive spirometer) to encourage you to breathe deeply.   Nausea and vomiting   You may feel sick to your stomach after surgery; if so, let your nurse know.    Pain control:  After surgery, you may have pain. Our goal is to help you manage your pain. Pain medicine will help you feel comfortable enough to do activities that will help you heal.  These activities may include breathing exercises, walking and physical therapy.   To help your health care team treat your pain we will ask: 1) If you have pain  2) where it is located 3) describe your pain in your words  Methods of pain control include medications given by mouth, vein or by nerve block for some surgeries.  We may give you a pain control pump that will:  1) Deliver the medicine through a tube placed in your vein  2) Control the amount of medicine you receive  3) Allow you to push a button to deliver a dose of pain medicine  Sequential Compression Device (SCD) or Pneumo Boots:  You may need to wear SCD S on your legs or feet. These are wraps connected to a machine that pumps in air and releases it. The repeated pumping helps prevent blood clots from forming.

## 2018-10-08 NOTE — MR AVS SNAPSHOT
After Visit Summary   10/8/2018    Lee Mckenzie    MRN: [id number]           Patient Information     Date Of Birth          1980        Visit Information        Provider Department      10/8/2018 8:30 AM Alina Meraz, APRN CNP [facility]        Care Instructions    Preparing for Your Surgery      Name:  Lee Mckenzie   MRN:  [id number]   :  1980   Today's Date:  10/8/2018     Arriving for surgery:  Lumbar Fusion   Surgery date:  2018  Arrival time:  5:30 am  Please come to:     [facility] [address]  [address] [place], [place] [address]. [place]  [address]    - parking is available in front of [facility] from 5:15AM to 8:00PM. If you prefer, park your car in the Green Lot.    -Proceed to the 3rd floor, check in at the Adult Surgery Waiting Lounge. [phone]    If an escort is needed stop at the Information Desk in the lobby. Inform the information person that you are here for surgery. An escort to the Adult Surgery Waiting Lounge will be provided.        What can I eat or drink?  -  You may have solid food or milk products until 8 hours prior to your surgery. (18 at 11 pm)  -  You may have water, apple juice or 7up/Sprite until 2 hours prior to your surgery.( until 5 am)    Which medicines can I take?         Stop Aspirin, vitamins and supplements one week prior to surgery.       Hold Ibuprofen and Naproxen for 24 hours prior to surgery.       -  Please take these medications the day of surgery:     Hydrocodone if needed       How do I prepare myself?  -  Take two showers: one the night before surgery; and one the morning of surgery.         Use Scrubcare or Hibiclens to wash from neck down.  You may use your own shampoo and conditioner. No other hair products.   -  Do NOT use lotion, powder, deodorant, or antiperspirant the day of your surgery.  -  Do NOT wear any makeup, fingernail polish or  jewelKOEZY.  - Do not bring your own medications to the hospital, except for inhalers and eye drops.  -  Bring your ID and insurance card.    Questions or Concerns:  -If you have questions or concerns regarding the day of surgery, please call [phone].     -If you are scheduled at the Ambulatory Surgery Center please call [phone].    -For questions after surgery please call your surgeons office.           AFTER YOUR SURGERY  Breathing exercises   Breathing exercises help you recover faster. Take deep breaths and let the air out slowly. This will:     Help you wake up after surgery.    Help prevent complications like pneumonia.  Preventing complications will help you go home sooner.   We may give you a breathing device (incentive spirometer) to encourage you to breathe deeply.   Nausea and vomiting   You may feel sick to your stomach after surgery; if so, let your nurse know.    Pain control:  After surgery, you may have pain. Our goal is to help you manage your pain. Pain medicine will help you feel comfortable enough to do activities that will help you heal.  These activities may include breathing exercises, walking and physical therapy.   To help your health care team treat your pain we will ask: 1) If you have pain  2) where it is located 3) describe your pain in your words  Methods of pain control include medications given by mouth, vein or by nerve block for some surgeries.  We may give you a pain control pump that will:  1) Deliver the medicine through a tube placed in your vein  2) Control the amount of medicine you receive  3) Allow you to push a button to deliver a dose of pain medicine  Sequential Compression Device (SCD) or Pneumo Boots:  You may need to wear SCD S on your legs or feet. These are wraps connected to a machine that pumps in air and releases it. The repeated pumping helps prevent blood clots from forming.           Follow-ups after your visit        Your next 10 appointments already  "scheduled     2018   Procedure with Charlie Nixon MD   [facility], [place], Same Day Surgery (--)    [address]  [place] [id number] [phone]            Dec 18, 2018  8:45 AM CST   (Arrive by 8:15 AM)   RETURN SPINE with Charlie Nixon MD   [facility] ([facility] [place])    [address]  4th Floor  [facility] [address] [phone]              Who to contact     Please call your clinic at [phone] to:    Ask questions about your health    Make or cancel appointments    Discuss your medicines    Learn about your test results    Speak to your doctor            Additional Information About Your Visit        [employer] Information     LOOKSIMA is an electronic gateway that provides easy, online access to your medical records. With LOOKSIMA, you can request a clinic appointment, read your test results, renew a prescription or communicate with your care team.     To sign up for LOOKSIMA visit the website at [email or web address]   You will be asked to enter the access code listed below, as well as some personal information. Please follow the directions to create your username and password.     Your access code is: [email or web address]  Expires: 2019  7:43 AM     Your access code will  in 90 days. If you need help or a new code, please contact your [facility] or call [phone] for assistance.        Care EveryWhere ID     This is your Care EveryWhere ID. This could be used by other organizations to access your [place] medical records  [id number]        Your Vitals Were     Pulse Temperature Respirations Height Pulse Oximetry BMI (Body Mass Index)    57 98.5  F (36.9  C) (Oral) 18 1.778 m (5' 10\") 97% 26.47 kg/m2       Blood Pressure from Last 3 Encounters:   10/08/18 117/49   18 117/74    Weight from Last 3 Encounters:   10/08/18 83.7 kg (184 lb 8 oz)   18 82.3 kg (181 lb 6.4 oz) "   09/06/18 84.4 kg (186 lb)              Today, you had the following     No orders found for display       Primary Care Provider Fax #    Physician No Ref-Primary [phone]       No address on file        Equal Access to Services     RONITFELICIA MARCIAL : [place] ivonne Gonzalez, washaeda luqadaha, qaajta kaalmada kwesi, velasquez gutierrez laLinoleisa melara. So [facility] [phone].    ATENCIÓN: Si habla español, tiene a gilliam disposición servicios gratuitos de asistencia lingüística. Llame al [phone].    We comply with applicable federal civil rights laws and [place] laws. We do not discriminate on the basis of race, color, national origin, age, disability, sex, sexual orientation, or gender identity.            Thank you!     Thank you for choosing [facility]  for your care. Our goal is always to provide you with excellent care. Hearing back from our patients is one way we can continue to improve our services. Please take a few minutes to complete the written survey that you may receive in the mail after your visit with us. Thank you!             Your Updated Medication List - Protect others around you: Learn how to safely use, store and throw away your medicines at www.disposemymeds.org.          This list is accurate as of 10/8/18  8:45 AM.  Always use your most recent med list.                   Brand Name Dispense Instructions for use Diagnosis    ALEVE PO      Take 220-440 mg by mouth as needed        diclofenac 1 % Gel topical gel    VOLTAREN     Place onto the skin as needed        HYDROcodone-acetaminophen 5-325 MG per tablet    NORCO     Take 1-2 tablets by mouth as needed for severe pain
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
## 2018-11-04 ENCOUNTER — ANESTHESIA EVENT (OUTPATIENT)
Dept: SURGERY | Facility: CLINIC | Age: 38
DRG: 460 | End: 2018-11-04
Payer: COMMERCIAL

## 2018-11-05 ENCOUNTER — APPOINTMENT (OUTPATIENT)
Dept: GENERAL RADIOLOGY | Facility: CLINIC | Age: 38
DRG: 460 | End: 2018-11-05
Attending: ORTHOPAEDIC SURGERY
Payer: COMMERCIAL

## 2018-11-05 ENCOUNTER — ANESTHESIA (OUTPATIENT)
Dept: SURGERY | Facility: CLINIC | Age: 38
DRG: 460 | End: 2018-11-05
Payer: COMMERCIAL

## 2018-11-05 ENCOUNTER — SURGERY (OUTPATIENT)
Age: 38
End: 2018-11-05

## 2018-11-05 ENCOUNTER — HOSPITAL ENCOUNTER (INPATIENT)
Facility: CLINIC | Age: 38
LOS: 1 days | Discharge: HOME | DRG: 460 | End: 2018-11-06
Attending: ORTHOPAEDIC SURGERY | Admitting: ORTHOPAEDIC SURGERY
Payer: COMMERCIAL

## 2018-11-05 DIAGNOSIS — M51.379 DEGENERATION OF LUMBAR OR LUMBOSACRAL INTERVERTEBRAL DISC: Primary | ICD-10-CM

## 2018-11-05 PROBLEM — M43.20 FUSION OF SPINE, SITE UNSPECIFIED: Status: ACTIVE | Noted: 2018-11-05

## 2018-11-05 LAB
ABO + RH BLD: NORMAL
ABO + RH BLD: NORMAL
BLD GP AB SCN SERPL QL: NORMAL
BLD PROD TYP BPU: NORMAL
BLOOD BANK CMNT PATIENT-IMP: NORMAL
GLUCOSE SERPL-MCNC: 104 MG/DL (ref 70–99)
HGB BLD-MCNC: 13.9 G/DL (ref 13.3–17.7)
NUM BPU REQUESTED: 1
SPECIMEN EXP DATE BLD: NORMAL

## 2018-11-05 PROCEDURE — 25000132 ZZH RX MED GY IP 250 OP 250 PS 637: Performed by: STUDENT IN AN ORGANIZED HEALTH CARE EDUCATION/TRAINING PROGRAM

## 2018-11-05 PROCEDURE — C1713 ANCHOR/SCREW BN/BN,TIS/BN: HCPCS | Performed by: ORTHOPAEDIC SURGERY

## 2018-11-05 PROCEDURE — 25000128 H RX IP 250 OP 636: Performed by: PHYSICIAN ASSISTANT

## 2018-11-05 PROCEDURE — L8699 PROSTHETIC IMPLANT NOS: HCPCS | Performed by: ORTHOPAEDIC SURGERY

## 2018-11-05 PROCEDURE — 99207 ZZC CONSULT E&M CHANGED TO SUBSEQUENT LEVEL: CPT | Performed by: HOSPITALIST

## 2018-11-05 PROCEDURE — 25000128 H RX IP 250 OP 636: Performed by: STUDENT IN AN ORGANIZED HEALTH CARE EDUCATION/TRAINING PROGRAM

## 2018-11-05 PROCEDURE — 0SG30A0 FUSION OF LUMBOSACRAL JOINT WITH INTERBODY FUSION DEVICE, ANTERIOR APPROACH, ANTERIOR COLUMN, OPEN APPROACH: ICD-10-PCS | Performed by: ORTHOPAEDIC SURGERY

## 2018-11-05 PROCEDURE — 25000128 H RX IP 250 OP 636: Performed by: ANESTHESIOLOGY

## 2018-11-05 PROCEDURE — 25000566 ZZH SEVOFLURANE, EA 15 MIN: Performed by: ORTHOPAEDIC SURGERY

## 2018-11-05 PROCEDURE — 36000078 ZZH SURGERY LEVEL 6 W FLUORO 1ST 30 MIN - UMMC: Performed by: ORTHOPAEDIC SURGERY

## 2018-11-05 PROCEDURE — 25000128 H RX IP 250 OP 636: Performed by: NURSE ANESTHETIST, CERTIFIED REGISTERED

## 2018-11-05 PROCEDURE — 82947 ASSAY GLUCOSE BLOOD QUANT: CPT | Performed by: ANESTHESIOLOGY

## 2018-11-05 PROCEDURE — 27210794 ZZH OR GENERAL SUPPLY STERILE: Performed by: ORTHOPAEDIC SURGERY

## 2018-11-05 PROCEDURE — 40000278 XR SURGERY CARM FLUORO LESS THAN 5 MIN: Mod: TC

## 2018-11-05 PROCEDURE — 25000128 H RX IP 250 OP 636

## 2018-11-05 PROCEDURE — 36000076 ZZH SURGERY LEVEL 6 EA 15 ADDTL MIN - UMMC: Performed by: ORTHOPAEDIC SURGERY

## 2018-11-05 PROCEDURE — 25000125 ZZHC RX 250: Performed by: PHYSICIAN ASSISTANT

## 2018-11-05 PROCEDURE — 25000125 ZZHC RX 250: Performed by: ORTHOPAEDIC SURGERY

## 2018-11-05 PROCEDURE — 27211020 ZZHC OR CELL SAVER OPNP: Performed by: ORTHOPAEDIC SURGERY

## 2018-11-05 PROCEDURE — C1762 CONN TISS, HUMAN(INC FASCIA): HCPCS | Performed by: ORTHOPAEDIC SURGERY

## 2018-11-05 PROCEDURE — 40000170 ZZH STATISTIC PRE-PROCEDURE ASSESSMENT II: Performed by: ORTHOPAEDIC SURGERY

## 2018-11-05 PROCEDURE — C1769 GUIDE WIRE: HCPCS | Performed by: ORTHOPAEDIC SURGERY

## 2018-11-05 PROCEDURE — 25000301 ZZH OR RX SURGIFLO W/THROMBIN KIT 2ML 1991 OPNP: Performed by: ORTHOPAEDIC SURGERY

## 2018-11-05 PROCEDURE — 12000008 ZZH R&B INTERMEDIATE UMMC

## 2018-11-05 PROCEDURE — 0SB40ZZ EXCISION OF LUMBOSACRAL DISC, OPEN APPROACH: ICD-10-PCS | Performed by: ORTHOPAEDIC SURGERY

## 2018-11-05 PROCEDURE — 25000125 ZZHC RX 250: Performed by: NURSE ANESTHETIST, CERTIFIED REGISTERED

## 2018-11-05 PROCEDURE — 3E0U0GB INTRODUCTION OF RECOMBINANT BONE MORPHOGENETIC PROTEIN INTO JOINTS, OPEN APPROACH: ICD-10-PCS | Performed by: ORTHOPAEDIC SURGERY

## 2018-11-05 PROCEDURE — 37000008 ZZH ANESTHESIA TECHNICAL FEE, 1ST 30 MIN: Performed by: ORTHOPAEDIC SURGERY

## 2018-11-05 PROCEDURE — 27210995 ZZH RX 272: Performed by: ORTHOPAEDIC SURGERY

## 2018-11-05 PROCEDURE — 71000014 ZZH RECOVERY PHASE 1 LEVEL 2 FIRST HR: Performed by: ORTHOPAEDIC SURGERY

## 2018-11-05 PROCEDURE — 25000131 ZZH RX MED GY IP 250 OP 636 PS 637: Performed by: STUDENT IN AN ORGANIZED HEALTH CARE EDUCATION/TRAINING PROGRAM

## 2018-11-05 PROCEDURE — 71000015 ZZH RECOVERY PHASE 1 LEVEL 2 EA ADDTL HR: Performed by: ORTHOPAEDIC SURGERY

## 2018-11-05 PROCEDURE — 99232 SBSQ HOSP IP/OBS MODERATE 35: CPT | Performed by: HOSPITALIST

## 2018-11-05 PROCEDURE — 40000277 XR SURGERY CARM FLUORO LESS THAN 5 MIN W STILLS

## 2018-11-05 PROCEDURE — 37000009 ZZH ANESTHESIA TECHNICAL FEE, EACH ADDTL 15 MIN: Performed by: ORTHOPAEDIC SURGERY

## 2018-11-05 PROCEDURE — 85018 HEMOGLOBIN: CPT | Performed by: ANESTHESIOLOGY

## 2018-11-05 PROCEDURE — 8E0WXBF COMPUTER ASSISTED PROCEDURE OF TRUNK REGION, WITH FLUOROSCOPY: ICD-10-PCS | Performed by: ORTHOPAEDIC SURGERY

## 2018-11-05 PROCEDURE — 36415 COLL VENOUS BLD VENIPUNCTURE: CPT | Performed by: ANESTHESIOLOGY

## 2018-11-05 DEVICE — IMPLANTABLE DEVICE: Type: IMPLANTABLE DEVICE | Site: SPINE LUMBAR | Status: FUNCTIONAL

## 2018-11-05 DEVICE — IMP SCR MEDT VOYAGER 4.75MM 6440530: Type: IMPLANTABLE DEVICE | Site: SPINE LUMBAR | Status: FUNCTIONAL

## 2018-11-05 DEVICE — IMP ROD MEDT CAPPED VOYAGER 4.75X35MM 641000035: Type: IMPLANTABLE DEVICE | Site: SPINE LUMBAR | Status: FUNCTIONAL

## 2018-11-05 DEVICE — GRAFT BONE INFUSE BMP SM 7510200: Type: IMPLANTABLE DEVICE | Site: SPINE LUMBAR | Status: FUNCTIONAL

## 2018-11-05 DEVICE — GRAFT BONE CRUSH CANC 15ML 400075: Type: IMPLANTABLE DEVICE | Site: SPINE LUMBAR | Status: FUNCTIONAL

## 2018-11-05 RX ORDER — ONDANSETRON 2 MG/ML
INJECTION INTRAMUSCULAR; INTRAVENOUS PRN
Status: DISCONTINUED | OUTPATIENT
Start: 2018-11-05 | End: 2018-11-05

## 2018-11-05 RX ORDER — CEFAZOLIN SODIUM 2 G/100ML
2 INJECTION, SOLUTION INTRAVENOUS
Status: COMPLETED | OUTPATIENT
Start: 2018-11-05 | End: 2018-11-05

## 2018-11-05 RX ORDER — LIDOCAINE HYDROCHLORIDE 20 MG/ML
INJECTION, SOLUTION INFILTRATION; PERINEURAL PRN
Status: DISCONTINUED | OUTPATIENT
Start: 2018-11-05 | End: 2018-11-05

## 2018-11-05 RX ORDER — DEXAMETHASONE SODIUM PHOSPHATE 4 MG/ML
INJECTION, SOLUTION INTRA-ARTICULAR; INTRALESIONAL; INTRAMUSCULAR; INTRAVENOUS; SOFT TISSUE PRN
Status: DISCONTINUED | OUTPATIENT
Start: 2018-11-05 | End: 2018-11-05

## 2018-11-05 RX ORDER — SODIUM CHLORIDE, SODIUM LACTATE, POTASSIUM CHLORIDE, CALCIUM CHLORIDE 600; 310; 30; 20 MG/100ML; MG/100ML; MG/100ML; MG/100ML
INJECTION, SOLUTION INTRAVENOUS CONTINUOUS
Status: DISCONTINUED | OUTPATIENT
Start: 2018-11-05 | End: 2018-11-05 | Stop reason: HOSPADM

## 2018-11-05 RX ORDER — CEFAZOLIN SODIUM 1 G/3ML
1 INJECTION, POWDER, FOR SOLUTION INTRAMUSCULAR; INTRAVENOUS EVERY 8 HOURS
Status: COMPLETED | OUTPATIENT
Start: 2018-11-05 | End: 2018-11-06

## 2018-11-05 RX ORDER — DOCUSATE SODIUM 100 MG/1
100 CAPSULE, LIQUID FILLED ORAL 2 TIMES DAILY
Status: DISCONTINUED | OUTPATIENT
Start: 2018-11-05 | End: 2018-11-06 | Stop reason: HOSPADM

## 2018-11-05 RX ORDER — SODIUM CHLORIDE 9 MG/ML
INJECTION, SOLUTION INTRAVENOUS
Status: COMPLETED
Start: 2018-11-05 | End: 2018-11-05

## 2018-11-05 RX ORDER — FENTANYL CITRATE 50 UG/ML
25-50 INJECTION, SOLUTION INTRAMUSCULAR; INTRAVENOUS
Status: DISCONTINUED | OUTPATIENT
Start: 2018-11-05 | End: 2018-11-05 | Stop reason: HOSPADM

## 2018-11-05 RX ORDER — KETOROLAC TROMETHAMINE 30 MG/ML
INJECTION, SOLUTION INTRAMUSCULAR; INTRAVENOUS PRN
Status: DISCONTINUED | OUTPATIENT
Start: 2018-11-05 | End: 2018-11-05

## 2018-11-05 RX ORDER — ONDANSETRON 4 MG/1
4 TABLET, ORALLY DISINTEGRATING ORAL EVERY 30 MIN PRN
Status: DISCONTINUED | OUTPATIENT
Start: 2018-11-05 | End: 2018-11-05 | Stop reason: HOSPADM

## 2018-11-05 RX ORDER — HYDROMORPHONE HYDROCHLORIDE 1 MG/ML
.5-.7 INJECTION, SOLUTION INTRAMUSCULAR; INTRAVENOUS; SUBCUTANEOUS
Status: DISCONTINUED | OUTPATIENT
Start: 2018-11-05 | End: 2018-11-06 | Stop reason: HOSPADM

## 2018-11-05 RX ORDER — CALCIUM CARBONATE 500 MG/1
1000 TABLET, CHEWABLE ORAL 4 TIMES DAILY PRN
Status: DISCONTINUED | OUTPATIENT
Start: 2018-11-05 | End: 2018-11-06 | Stop reason: HOSPADM

## 2018-11-05 RX ORDER — CEFAZOLIN SODIUM 1 G/3ML
1 INJECTION, POWDER, FOR SOLUTION INTRAMUSCULAR; INTRAVENOUS SEE ADMIN INSTRUCTIONS
Status: DISCONTINUED | OUTPATIENT
Start: 2018-11-05 | End: 2018-11-05 | Stop reason: HOSPADM

## 2018-11-05 RX ORDER — ACETAMINOPHEN 325 MG/1
650 TABLET ORAL EVERY 4 HOURS PRN
Status: DISCONTINUED | OUTPATIENT
Start: 2018-11-08 | End: 2018-11-06 | Stop reason: HOSPADM

## 2018-11-05 RX ORDER — MAGNESIUM HYDROXIDE 1200 MG/15ML
LIQUID ORAL PRN
Status: DISCONTINUED | OUTPATIENT
Start: 2018-11-05 | End: 2018-11-05 | Stop reason: HOSPADM

## 2018-11-05 RX ORDER — ONDANSETRON 4 MG/1
4 TABLET, ORALLY DISINTEGRATING ORAL EVERY 6 HOURS PRN
Status: DISCONTINUED | OUTPATIENT
Start: 2018-11-05 | End: 2018-11-06 | Stop reason: HOSPADM

## 2018-11-05 RX ORDER — ALBUTEROL SULFATE 0.83 MG/ML
2.5 SOLUTION RESPIRATORY (INHALATION) ONCE
Status: DISCONTINUED | OUTPATIENT
Start: 2018-11-05 | End: 2018-11-05 | Stop reason: HOSPADM

## 2018-11-05 RX ORDER — NALOXONE HYDROCHLORIDE 0.4 MG/ML
.1-.4 INJECTION, SOLUTION INTRAMUSCULAR; INTRAVENOUS; SUBCUTANEOUS
Status: DISCONTINUED | OUTPATIENT
Start: 2018-11-05 | End: 2018-11-06 | Stop reason: HOSPADM

## 2018-11-05 RX ORDER — ONDANSETRON 2 MG/ML
4 INJECTION INTRAMUSCULAR; INTRAVENOUS EVERY 6 HOURS PRN
Status: DISCONTINUED | OUTPATIENT
Start: 2018-11-05 | End: 2018-11-06 | Stop reason: HOSPADM

## 2018-11-05 RX ORDER — LIDOCAINE 40 MG/G
CREAM TOPICAL
Status: DISCONTINUED | OUTPATIENT
Start: 2018-11-05 | End: 2018-11-05 | Stop reason: HOSPADM

## 2018-11-05 RX ORDER — BUPIVACAINE HYDROCHLORIDE AND EPINEPHRINE 2.5; 5 MG/ML; UG/ML
INJECTION, SOLUTION INFILTRATION; PERINEURAL PRN
Status: DISCONTINUED | OUTPATIENT
Start: 2018-11-05 | End: 2018-11-05 | Stop reason: HOSPADM

## 2018-11-05 RX ORDER — METHOCARBAMOL 750 MG/1
750 TABLET, FILM COATED ORAL EVERY 6 HOURS PRN
Status: DISCONTINUED | OUTPATIENT
Start: 2018-11-05 | End: 2018-11-06 | Stop reason: HOSPADM

## 2018-11-05 RX ORDER — ACETAMINOPHEN 325 MG/1
975 TABLET ORAL EVERY 8 HOURS
Status: DISCONTINUED | OUTPATIENT
Start: 2018-11-05 | End: 2018-11-06 | Stop reason: HOSPADM

## 2018-11-05 RX ORDER — PROPOFOL 10 MG/ML
INJECTION, EMULSION INTRAVENOUS PRN
Status: DISCONTINUED | OUTPATIENT
Start: 2018-11-05 | End: 2018-11-05

## 2018-11-05 RX ORDER — LIDOCAINE 40 MG/G
CREAM TOPICAL
Status: DISCONTINUED | OUTPATIENT
Start: 2018-11-05 | End: 2018-11-06 | Stop reason: HOSPADM

## 2018-11-05 RX ORDER — HYDROXYZINE HYDROCHLORIDE 25 MG/1
25-50 TABLET, FILM COATED ORAL EVERY 6 HOURS PRN
Status: DISCONTINUED | OUTPATIENT
Start: 2018-11-05 | End: 2018-11-06 | Stop reason: HOSPADM

## 2018-11-05 RX ORDER — FENTANYL CITRATE 50 UG/ML
INJECTION, SOLUTION INTRAMUSCULAR; INTRAVENOUS PRN
Status: DISCONTINUED | OUTPATIENT
Start: 2018-11-05 | End: 2018-11-05

## 2018-11-05 RX ORDER — NALOXONE HYDROCHLORIDE 0.4 MG/ML
.1-.4 INJECTION, SOLUTION INTRAMUSCULAR; INTRAVENOUS; SUBCUTANEOUS
Status: DISCONTINUED | OUTPATIENT
Start: 2018-11-05 | End: 2018-11-05

## 2018-11-05 RX ORDER — ACETAMINOPHEN 325 MG/1
975 TABLET ORAL ONCE
Status: DISCONTINUED | OUTPATIENT
Start: 2018-11-05 | End: 2018-11-05 | Stop reason: HOSPADM

## 2018-11-05 RX ORDER — HYDROMORPHONE HYDROCHLORIDE 1 MG/ML
.3-.5 INJECTION, SOLUTION INTRAMUSCULAR; INTRAVENOUS; SUBCUTANEOUS EVERY 5 MIN PRN
Status: DISCONTINUED | OUTPATIENT
Start: 2018-11-05 | End: 2018-11-05 | Stop reason: HOSPADM

## 2018-11-05 RX ORDER — GABAPENTIN 100 MG/1
300 CAPSULE ORAL ONCE
Status: DISCONTINUED | OUTPATIENT
Start: 2018-11-05 | End: 2018-11-05 | Stop reason: HOSPADM

## 2018-11-05 RX ORDER — KETOROLAC TROMETHAMINE 30 MG/ML
30 INJECTION, SOLUTION INTRAMUSCULAR; INTRAVENOUS EVERY 6 HOURS
Status: DISCONTINUED | OUTPATIENT
Start: 2018-11-05 | End: 2018-11-06 | Stop reason: HOSPADM

## 2018-11-05 RX ORDER — ONDANSETRON 2 MG/ML
4 INJECTION INTRAMUSCULAR; INTRAVENOUS EVERY 30 MIN PRN
Status: DISCONTINUED | OUTPATIENT
Start: 2018-11-05 | End: 2018-11-05 | Stop reason: HOSPADM

## 2018-11-05 RX ORDER — OXYCODONE HYDROCHLORIDE 5 MG/1
10-15 TABLET ORAL
Status: DISCONTINUED | OUTPATIENT
Start: 2018-11-05 | End: 2018-11-06 | Stop reason: HOSPADM

## 2018-11-05 RX ADMIN — PROPOFOL 150 MG: 10 INJECTION, EMULSION INTRAVENOUS at 07:01

## 2018-11-05 RX ADMIN — FENTANYL CITRATE 25 MCG: 50 INJECTION, SOLUTION INTRAMUSCULAR; INTRAVENOUS at 08:27

## 2018-11-05 RX ADMIN — ACETAMINOPHEN 975 MG: 325 TABLET, FILM COATED ORAL at 14:40

## 2018-11-05 RX ADMIN — Medication 0.5 MG: at 16:07

## 2018-11-05 RX ADMIN — ONDANSETRON 4 MG: 2 INJECTION INTRAMUSCULAR; INTRAVENOUS at 12:16

## 2018-11-05 RX ADMIN — MIDAZOLAM 2 MG: 1 INJECTION INTRAMUSCULAR; INTRAVENOUS at 06:56

## 2018-11-05 RX ADMIN — HYDROMORPHONE HYDROCHLORIDE 0.25 MG: 1 INJECTION, SOLUTION INTRAMUSCULAR; INTRAVENOUS; SUBCUTANEOUS at 08:44

## 2018-11-05 RX ADMIN — OXYCODONE HYDROCHLORIDE 10 MG: 5 TABLET ORAL at 17:38

## 2018-11-05 RX ADMIN — HYDROMORPHONE HYDROCHLORIDE 0.25 MG: 1 INJECTION, SOLUTION INTRAMUSCULAR; INTRAVENOUS; SUBCUTANEOUS at 08:28

## 2018-11-05 RX ADMIN — DOCUSATE SODIUM 100 MG: 100 CAPSULE, LIQUID FILLED ORAL at 21:51

## 2018-11-05 RX ADMIN — FENTANYL CITRATE 25 MCG: 50 INJECTION, SOLUTION INTRAMUSCULAR; INTRAVENOUS at 08:13

## 2018-11-05 RX ADMIN — FENTANYL CITRATE 50 MCG: 50 INJECTION, SOLUTION INTRAMUSCULAR; INTRAVENOUS at 07:42

## 2018-11-05 RX ADMIN — THROMBIN HUMAN 1 KIT: 2000 POWDER, FOR SOLUTION TOPICAL at 12:30

## 2018-11-05 RX ADMIN — KETOROLAC TROMETHAMINE 30 MG: 30 INJECTION, SOLUTION INTRAMUSCULAR at 17:38

## 2018-11-05 RX ADMIN — Medication 0.5 MG: at 13:50

## 2018-11-05 RX ADMIN — HYDROMORPHONE HYDROCHLORIDE 0.25 MG: 1 INJECTION, SOLUTION INTRAMUSCULAR; INTRAVENOUS; SUBCUTANEOUS at 12:11

## 2018-11-05 RX ADMIN — BUPIVACAINE HYDROCHLORIDE AND EPINEPHRINE BITARTRATE 50 ML: 2.5; .005 INJECTION, SOLUTION INFILTRATION; PERINEURAL at 09:24

## 2018-11-05 RX ADMIN — KETOROLAC TROMETHAMINE 30 MG: 30 INJECTION, SOLUTION INTRAMUSCULAR at 12:22

## 2018-11-05 RX ADMIN — CEFAZOLIN SODIUM 1 G: 2 INJECTION, SOLUTION INTRAVENOUS at 11:06

## 2018-11-05 RX ADMIN — LIDOCAINE HYDROCHLORIDE 80 MG: 20 INJECTION, SOLUTION INFILTRATION; PERINEURAL at 07:01

## 2018-11-05 RX ADMIN — ROCURONIUM BROMIDE 20 MG: 10 INJECTION INTRAVENOUS at 07:43

## 2018-11-05 RX ADMIN — SODIUM CHLORIDE 500 ML: 900 IRRIGANT IRRIGATION at 12:16

## 2018-11-05 RX ADMIN — HYDROMORPHONE HYDROCHLORIDE 0.25 MG: 1 INJECTION, SOLUTION INTRAMUSCULAR; INTRAVENOUS; SUBCUTANEOUS at 11:05

## 2018-11-05 RX ADMIN — BUPIVACAINE HYDROCHLORIDE AND EPINEPHRINE BITARTRATE 20 ML: 2.5; .005 INJECTION, SOLUTION INFILTRATION; PERINEURAL at 10:43

## 2018-11-05 RX ADMIN — SODIUM CHLORIDE 1000 ML: 9 INJECTION, SOLUTION INTRAVENOUS at 19:27

## 2018-11-05 RX ADMIN — HYDROXYZINE HYDROCHLORIDE 25 MG: 25 TABLET ORAL at 14:42

## 2018-11-05 RX ADMIN — FENTANYL CITRATE 25 MCG: 50 INJECTION, SOLUTION INTRAMUSCULAR; INTRAVENOUS at 07:55

## 2018-11-05 RX ADMIN — OXYCODONE HYDROCHLORIDE 15 MG: 5 TABLET ORAL at 20:35

## 2018-11-05 RX ADMIN — OXYCODONE HYDROCHLORIDE 5 MG: 5 TABLET ORAL at 19:13

## 2018-11-05 RX ADMIN — CEFAZOLIN SODIUM 1 G: 1 INJECTION, POWDER, FOR SOLUTION INTRAMUSCULAR; INTRAVENOUS at 18:46

## 2018-11-05 RX ADMIN — CEFAZOLIN SODIUM 1 G: 2 INJECTION, SOLUTION INTRAVENOUS at 09:15

## 2018-11-05 RX ADMIN — BUPIVACAINE HYDROCHLORIDE AND EPINEPHRINE BITARTRATE 10 ML: 2.5; .005 INJECTION, SOLUTION INFILTRATION; PERINEURAL at 12:16

## 2018-11-05 RX ADMIN — METHOCARBAMOL 750 MG: 750 TABLET, FILM COATED ORAL at 22:50

## 2018-11-05 RX ADMIN — SODIUM CHLORIDE, POTASSIUM CHLORIDE, SODIUM LACTATE AND CALCIUM CHLORIDE: 600; 310; 30; 20 INJECTION, SOLUTION INTRAVENOUS at 06:54

## 2018-11-05 RX ADMIN — HYDROMORPHONE HYDROCHLORIDE 0.25 MG: 1 INJECTION, SOLUTION INTRAMUSCULAR; INTRAVENOUS; SUBCUTANEOUS at 12:15

## 2018-11-05 RX ADMIN — ROCURONIUM BROMIDE 10 MG: 10 INJECTION INTRAVENOUS at 10:46

## 2018-11-05 RX ADMIN — ROCURONIUM BROMIDE 20 MG: 10 INJECTION INTRAVENOUS at 08:26

## 2018-11-05 RX ADMIN — Medication 0.5 MG: at 13:31

## 2018-11-05 RX ADMIN — CEFAZOLIN SODIUM 2 G: 2 INJECTION, SOLUTION INTRAVENOUS at 07:16

## 2018-11-05 RX ADMIN — ROCURONIUM BROMIDE 20 MG: 10 INJECTION INTRAVENOUS at 09:42

## 2018-11-05 RX ADMIN — SODIUM CHLORIDE 2.48 G: 9 INJECTION, SOLUTION INTRAVENOUS at 07:16

## 2018-11-05 RX ADMIN — FENTANYL CITRATE 25 MCG: 50 INJECTION, SOLUTION INTRAMUSCULAR; INTRAVENOUS at 07:44

## 2018-11-05 RX ADMIN — ROCURONIUM BROMIDE 10 MG: 10 INJECTION INTRAVENOUS at 09:03

## 2018-11-05 RX ADMIN — DEXAMETHASONE SODIUM PHOSPHATE 8 MG: 4 INJECTION, SOLUTION INTRAMUSCULAR; INTRAVENOUS at 07:01

## 2018-11-05 RX ADMIN — FENTANYL CITRATE 50 MCG: 50 INJECTION, SOLUTION INTRAMUSCULAR; INTRAVENOUS at 07:01

## 2018-11-05 RX ADMIN — SODIUM CHLORIDE 10 MG/KG/HR: 0.9 INJECTION, SOLUTION INTRAVENOUS at 07:49

## 2018-11-05 RX ADMIN — SODIUM CHLORIDE, POTASSIUM CHLORIDE, SODIUM LACTATE AND CALCIUM CHLORIDE: 600; 310; 30; 20 INJECTION, SOLUTION INTRAVENOUS at 10:45

## 2018-11-05 RX ADMIN — FENTANYL CITRATE 50 MCG: 50 INJECTION, SOLUTION INTRAMUSCULAR; INTRAVENOUS at 13:23

## 2018-11-05 RX ADMIN — SUGAMMADEX 170 MG: 100 INJECTION, SOLUTION INTRAVENOUS at 12:39

## 2018-11-05 RX ADMIN — ROCURONIUM BROMIDE 50 MG: 10 INJECTION INTRAVENOUS at 07:01

## 2018-11-05 RX ADMIN — FENTANYL CITRATE 25 MCG: 50 INJECTION, SOLUTION INTRAMUSCULAR; INTRAVENOUS at 13:19

## 2018-11-05 RX ADMIN — Medication 0.5 MG: at 14:07

## 2018-11-05 RX ADMIN — ONDANSETRON 4 MG: 4 TABLET, ORALLY DISINTEGRATING ORAL at 18:42

## 2018-11-05 RX ADMIN — HYDROMORPHONE HYDROCHLORIDE 0.25 MG: 1 INJECTION, SOLUTION INTRAMUSCULAR; INTRAVENOUS; SUBCUTANEOUS at 10:46

## 2018-11-05 RX ADMIN — HYDROXYZINE HYDROCHLORIDE 25 MG: 25 TABLET ORAL at 15:12

## 2018-11-05 RX ADMIN — ACETAMINOPHEN 975 MG: 325 TABLET, FILM COATED ORAL at 21:51

## 2018-11-05 RX ADMIN — METHOCARBAMOL 750 MG: 750 TABLET, FILM COATED ORAL at 16:33

## 2018-11-05 RX ADMIN — FENTANYL CITRATE 25 MCG: 50 INJECTION, SOLUTION INTRAMUSCULAR; INTRAVENOUS at 13:13

## 2018-11-05 ASSESSMENT — LIFESTYLE VARIABLES: TOBACCO_USE: 1

## 2018-11-05 ASSESSMENT — ACTIVITIES OF DAILY LIVING (ADL)
ADLS_ACUITY_SCORE: 11
ADLS_ACUITY_SCORE: 11

## 2018-11-05 NOTE — OP NOTE
Date of Service:  11/5/2018       Surgeon:  Charlie Nixon MD   Co-surgeon for Part 1 - anterior procedure:  Liane Merino MD.  Dr. Merino's expertise as a vascular surgeon was necessary for safe anterior retroperitoneal approach to the lumbosacral spine, with mobilization and protection of major vascular structures.  Assistants:  (1) Davin Greenwood MD PGY-4; (2) Mateo Ortega MD - Vascular fellow.      Preoperative Diagnosis:     1.  Degenerative disc disease L5-S1, with chronic discogenic low back pain.  2.  Right posterolateral disc herniation L5-S1, with Right S1 radiculopathy.  3.  Chronic smoking (1 ppd x 25-30 yrs).  4.  Chronic opioid dependence (currently hydrocodone 5/325, ~ 5 tabs/day).        Postoperative Diagnosis:     Same      Procedures:   Part 1 - anterior procedure:  1.  Anterior lumbar interbody fusion (ALIF) L5-S1, using Infuse rhBMP-2 and crushed cancellous allograft.  2.  Placement of interbody titanium cage L5-S1.  3.  Buttress fixation of cage into L5 vertebral body using integrated screw thru cage.    Part 2 - posterior procedure:  1.  Bilateral percutaneous pedicle screw fixation L5-S1.  2.  MIS right hemilaminectomy, medial facetectomy, foraminotomy and diskectomy L5-S1.  3.  Computer navigation using O-arm and Stealth.  4.  Use of operating microscope.      Anesthesia:  General endotracheal.   Local anesthetic:  0.25% marcaine + epinephrine = 80 mL (50 anterior, 30 posterior).  EBL:  part 1 = 50 mL; part 2 = 25 mL; total 75 mL.  Complications:  None apparent.   Implants / Equipment used:   1.  NuVasive Base titanium cage: L5-S1 = 6 mm post ht x 18 mm ant ht x 38 mm width x 28 mm depth, 25 degrees lordotic; One 25 mm screw.  2.  Medtronic Voyager perc screw system: L5 = 7.5 x 50 mm bilateral; S1 = 7.5 x 45 mm right, 7.5 x 40 mm left; Two 4.75mm x 35 mm CoCr rods.  3.  Medtronic METRx MIS system; L5-S1 = 6 cm x 20 mm bevelled tubular retractor.  4.  Small kit Infuse rhBMP-2.  5.  Crushed  cancellous allograft 15 mL.  6.  TXA 30 mg/kg LD then 10 mg/kg/hr.      Indications:  37 year old male with chronic low back pain, history of injury 11 years ago (cutting trees, hauling logs; fest snap in low back followed by LBP).  Imaging revealed advanced disc degeneration and right sided disc herniation L5-S1.  Tried nonoperative treatment, continues to have significant disabling symptoms.  I thus offered surgery in form of single-level fusion (ALIF), pedicle screw fixation and diskectomy.  Consented after thorough discussion of the rationale, risks, benefits and alternatives.  Also consented to on-label use of Infuse rhBMP-2.      Details:  Part 1 - anterior procedure:    Properly identified in preop area, site marked, informed consent signed.  Wheeled to OR.  Brief earlier performed.  General anesthesia administered.  Wong inserted.  Antibiotic given: Cefazolin 2 gm IV, then 1 gm q2h.  Positioned supine on Flat Trios table.  Lower abdominal hair clipped; region squared off, prepped with ChloraPrep and draped in sterile fashion. Surgical timeout performed.  Both Dr. Merino and myself were present for timeout.      Dr. Merino and her fellow performed left anterior retroperitoneal approach to lumbosacral spine via left paramedian vertical skin incision.  Please refer to her report for details.  NuVasive ALIF retractor system used.  Once exposed, G18 needle inserted into presumed L5-S1 disc; lateral image showed correct level; verified by Radiologist.  Confirmatory timeout performed.  Rectangular annulectomy using long knife.  Carilaginous endplate inferior L5 and superior S1 released using Harrison elevator.  Disc material removed using rongeur.  Endplates prepped using straight and angled curettes, and ball-tipped franci.  Distraction applied.  Trial spacers placed; started with 20 degrees - 4mm, 6mm, 8mm.  Went up to 25 degrees; elected to use 6 mm posterior height cage.  Titanium cage packed with BMP and allograft.  A  Small kit Infuse was earlier prepared; contained 2 sponges.  Both sponges placed within cage; further packed with allograft.  Cage impacted into disc space.  A 25 mm buttress screw was inserted thru the cage into the S1 vertebral body.  Final AP-lateral images showed acceptable cage placement, good restoration of disc height and segmental lordosis.  Irrigation.  Hemostasis observed.  Closure performed by Dr. Merino and fellow.  Anesthetic injected.  Sterile dressings applied.    Part 2 - posterior procedure:  Turned prone on [employer] 4poster table.  Arms on armboards.  Lumbar hair clipped, squared off, prepped with Chlorhexidine and Chloraprep, draped in sterile fashion.  Timeout performed.    Stab incision made over R PSIS; long perc pin inserted into bone, attached sandoval frame.  O-arm 3D scan obtained.  Using sandoval probe, identified ideal skin entry points for bilateral pedicle screw fixation L5-S1.  Noe planned bilateral paraspinal skin incisions; skin infiltrated with anesthetic.  3cm skin incisions made down to subcutaneous.  Percutaneous cannulation of bilateral L5 and S1 pedicles using sandoval LOUIS needle; exchanged for guidewires.  After all 4 wires placed, AP-lat images showed good position.  Dilated and tapped over guidewires (planned to use 7.5 mm screws, used 6.5 mm tap).  Screws inserted; wires removed.  Caliper used to select ish length; elected to use 35 mm rods bilaterally.  Rods passes submuscularly from th bottom.  Lateal image showed good ish length on both ends.  S1 set plugs placed; S1 screw heads tilted distally prior to tightening the set plugs; this caused the rods to stick up from the L5 screws.  Gradual reduction of the ish to the screwheads was then achieved by gradual tightening of L5 set plugs bilaterally.  This reduced any residual listhesis and improved segmental lordosis.  Set plugs final tightened.      Using sandoval probe, I selected level of skin incision for L5-S1 diskectomy.  1 inch incision made  just to right of spinous process.  Fascia incised just to right of spinous process.  Sequential dilation performed down to lamina.  O-arm ap-lat images and 3D scan showed good placement of all implants, acceptable alignment, and correct placement of dilators over L5-S1 disc space.  6 cm tubular retractor placed, anchored to bed using articulating arm.  Microscope brought in.  Soft tissue cleared.  Right L5-S1 hemilaminectomy performed using franci, completed with kerrisons.  Medial facetectomy performed to medial pedicle wall.  Ligamentum flavum removed thus exposing the underlying dural sac and nerve root; identified traversing R S1 nerve root.  Epidural bleeders controlled using bipolar.  Palpated right L5-S1 foramen using Bloom probe; noted narrowing.  Right foraminal decompression performed using curve 2 mm kerrison.  Adequate decompression confirmed using bloom probe.  Right S1 nerve root retracted medially.  Annulotomy performed using knife.  Removed disc material using pituitary.  Unable to identify an extruded fragment.  I palpated ventral to the nerve root using Bloom probe; was unable to identify any significant disc protrusion.  Followed the nerve; noted to be freely mobile and free of pressure.  Hemostasis observed using bipolar, Surgiflo, cottonoids.  Irrigation.  Injected ~ 4 ml anesthetic over epidural space.  Retractor and microscope removed.     Layered closure using 0 Vicryl, 2-0 Vicryl and 3-0 Monocryl.  Dermabond and sterile dressings applied.  Injected anesthetic.  Turned supine, taken off general anesthetic, transferred to recovery in satisfactory condition.       Postop:  Admit to surgical floor.  Antibiotics x 2 more doses.  Mechanical DVT prophylaxis.  PT and OT consult.  Advance diet as tolerated.  No lifting more than 10 pounds, no excessive bending or twisting.  Hospitalist comanagement for medical issues.  Lumbar AP-lat standing x-rays (incl femoral heads) prior to discharge.   Anticipated discharge in 2-3 days.  RTC 6 weeks with EOS lumbar AP-lat x-rays.

## 2018-11-05 NOTE — OR NURSING
Patient smokes marijuana daily.  Last time Saturday 11/3/18.  Patient does not fentanyl given.  Instructed to speak with MDA.

## 2018-11-05 NOTE — PLAN OF CARE
Problem: Patient Care Overview  Goal: Plan of Care/Patient Progress Review  Outcome: No Change        VS:   VSS. LS clear. Pt refusing CAPNO RA 96%.IPI was at 8-10, EtCo2 37-40 A&Ox4.    Output:   Wong removed per pt request. Voiding good amounts clear yellow urine. PVR 75 and 15. BS hypoactive not passing gas. Last BM this Am 11/05 before surgery   Activity:   SBA with walker at bedside or sits on EOB. Pt Repositioning self from side to side   Skin: Intact ex anterior and posterior incisions   Pain:   Intolerable at times more tolerable with PRN oxycodone, Robaxin, and IV dilaudid x1 upon arrival. Pt complaints of bilateral thigh pain that resolves with hot packs and muscle relaxant   Neuro/CMS:   Intact denies n/t. Baseline had pain in right leg that radiates, per pt has since improved since procedure, only new pain from surgery   Dressing(s):   CDI anterior, posterior dressings marked. Drain was placed in OR but accidentally removed in PACU during repositioning MD aware. Keeping close eye on dressings and marked   Diet:   Clear liq. Denies n/v   LDA:   PIVx2. Running NS 75ml/tko   Equipment:   Walker PCDs in place,  refusing CAPNO IV pole/pump   Plan:   TBD   Additional Info:   Pt had nausea improved with ODT Zofran. Pt likes to be awoken for pain medications when available

## 2018-11-05 NOTE — ANESTHESIA POSTPROCEDURE EVALUATION
Anesthesia POST Procedure Evaluation    Patient: Lee Mckenzie   MRN:     [id number] Gender:   male   Age:    37 year old :      1980        Preoperative Diagnosis: Degenerative Disc Disease Lumabr -Sacral 1; Right Sacral 1 Radiculopathy, Degeneration Of Lumbar OR Lumbosacral Intervertebral  Disc, Osteoarthritis Of Spine with Radiculopathy, Sacral and Sacrococcygeal Region.   Procedure(s):  Part 1:  (supine) Anterior Lumbar Interbody Fusion Lumbar 5-Sacral 1; Use Of rhBMP-2     Part 2:  (prone) Stealth Guided Pedicle Screw Fixation Lumbar 5 -Sacral 1; Right Hemilaminectomy-Discectomy Lumbar 5-Sacral 1   Postop Comments: No value filed.       Anesthesia Type:  General    Reportable Event: NO     PAIN: Complex/Difficult     Events: Difficult postop Pain Control     Interventions: Opioids; Multimodal   Sign Out status: Comfortable, Well controlled pain     PONV: No PONV   Sign Out status:  No Nausea or Vomiting     Neuro/Psych: Uneventful perioperative course   Sign Out Status: Preoperative baseline; Age appropriate mentation     Airway/Resp.: Uneventful perioperative course   Sign Out Status: Non labored breathing, age appropriate RR; Resp. Status within EXPECTED Parameters     CV: Uneventful perioperative course   Sign Out status: Appropriate BP and perfusion indices; Appropriate HR/Rhythm     Disposition:   Sign Out in:  PACU  Disposition:  Phase II; Home  Recovery Course: Uneventful  Follow-Up: Not required           Last Anesthesia Record Vitals:  CRNA VITALS  2018 1210 - 2018 1310      2018             NIBP: (!)  134/96    Ht Rate: 78    Temp: 36.9  C (98.4  F)    SpO2: 97 %    EKG: Sinus rhythm          Last PACU/Preop Vitals:  Vitals:    18 1300 18 1315 18 1330   BP: 128/70 140/80 143/78   Pulse:      Resp: 14 12 12   Temp:  36.6  C (97.9  F)    SpO2: 98% 98% 95%         Electronically Signed By: Paramjit Lyle MD, 2018, 2:49 PM

## 2018-11-05 NOTE — ANESTHESIA PREPROCEDURE EVALUATION
"Anesthesia Pre-Procedure Evaluation    Patient: Lee Mckenzie   MRN:     [id number] Gender:   male   Age:    37 year old :      1980        Preoperative Diagnosis: Degenerative Disc Disease Lumabr -Sacral 1; Right Sacral 1 Radiculopathy, Degeneration Of Lumbar OR Lumbosacral Intervertebral  Disc, Osteoarthritis Of Spine with Radiculopathy, Sacral and Sacrococcygeal Region.   Procedure(s):  Part 1:  (supine) Anterior Lumbar Interbody Fusion Lumbar 5-Sacral 1; Use Of rhBMP-2     Part 2:  (prone) Stealth Guided Pedicle Screw Fixation Lumbar 5 -Sacral 1; Right Hemilaminectomy-Discectomy Lumbar 5-Sacral 1     Past Medical History:   Diagnosis Date     Disc degeneration, lumbar      Osteoarthritis      Tobacco abuse       Past Surgical History:   Procedure Laterality Date     oral surgery            Anesthesia Evaluation     . Pt has had prior anesthetic. Type: General    No history of anesthetic complications          ROS/MED HX    ENT/Pulmonary: Comment: Broken jaw as a teenage requiring surgical intervention with hardware intact.     (+)tobacco use (Smoked since age 11 about a pack a day.  Does smoke marijuana daily.  Has quit smoking. ), Current use , . .    Neurologic:  - neg neurologic ROS     Cardiovascular:  - neg cardiovascular ROS   (+) ----. : . . . :. . No previous cardiac testing       METS/Exercise Tolerance: Comment: Despite back pain, remains active as a , enjoys fishing and takes care of his yard.  Reports he\"pushes through\" the pain.  >4 METS   Hematologic:  - neg hematologic  ROS       Musculoskeletal: Comment: Arthritis in spine and hands.   (+) arthritis, , , -       GI/Hepatic:  - neg GI/hepatic ROS       Renal/Genitourinary:  - ROS Renal section negative       Endo:  - neg endo ROS       Psychiatric:     (+) psychiatric history depression (Tried medication in the past for paranoia.  Did not help. )      Infectious Disease:  - neg infectious disease ROS       Malignancy:      - no " "malignancy   Other:    (+) No chance of pregnancy H/O Chronic Pain,H/O chronic opiod use , no other significant disability                        PHYSICAL EXAM:   Mental Status/Neuro: A/A/O   Airway: Facies: Feasible  Mallampati: I  Mouth/Opening: Full  TM distance: > 6 cm  Neck ROM: Full   Respiratory: Auscultation: CTAB     Resp. Rate: Normal     Resp. Effort: Normal      CV: Rhythm: Regular  Rate: Age appropriate  Heart: Normal Sounds   Comments:      Dental: Normal                  Lab Results   Component Value Date    WBC 10.9 09/17/2018    HGB 13.9 11/05/2018    HCT 44.3 09/17/2018     09/17/2018     09/17/2018    POTASSIUM 4.1 09/17/2018    CHLORIDE 107 09/17/2018    CO2 27 09/17/2018    BUN 14 09/17/2018    CR 0.92 09/17/2018     (H) 11/05/2018    NATE 9.2 09/17/2018    INR 0.96 10/08/2018       Preop Vitals  BP Readings from Last 3 Encounters:   11/05/18 131/81   10/08/18 117/49   09/17/18 117/74    Pulse Readings from Last 3 Encounters:   11/05/18 95   10/08/18 57   09/17/18 57      Resp Readings from Last 3 Encounters:   10/08/18 18   09/17/18 18    SpO2 Readings from Last 3 Encounters:   11/05/18 99%   10/08/18 97%   09/17/18 99%      Temp Readings from Last 1 Encounters:   11/05/18 36.4  C (97.5  F) (Oral)    Ht Readings from Last 1 Encounters:   11/05/18 1.727 m (5' 8\")      Wt Readings from Last 1 Encounters:   11/05/18 82.8 kg (182 lb 8.7 oz)    Estimated body mass index is 27.76 kg/(m^2) as calculated from the following:    Height as of this encounter: 1.727 m (5' 8\").    Weight as of this encounter: 82.8 kg (182 lb 8.7 oz).     LDA:  Peripheral IV 11/05/18 Left Hand (Active)   Site Assessment WDL 11/5/2018  6:28 AM   Line Status Saline locked 11/5/2018  6:28 AM   Phlebitis Scale 0-->no symptoms 11/5/2018  6:28 AM   Infiltration Scale 0 11/5/2018  6:28 AM   Infiltration Site Treatment Method  None 11/5/2018  6:28 AM   Extravasation? No 11/5/2018  6:28 AM   Dressing Intervention " New dressing  11/5/2018  6:28 AM   Number of days:0            Assessment:   ASA SCORE: 2    NPO Status: > 2 hours since completed Clear Liquids; > 6 hours since completed Solid Foods   Documentation: H&P complete   Proceeding: Proceed without further delay  Tobacco Use:  NO Active use of Tobacco/UNKNOWN Tobacco use status     Plan:   Anes. Type:  General   Pre-Induction: Midazolam IV; Acetaminophen PO   Induction:  IV (Standard)   Airway: Oral ETT   Access/Monitoring: PIV; 2nd PIV   Maintenance: Balanced   Emergence: Procedure Site   Logistics: Observation/Admission     Postop Pain/Sedation Strategy:  Standard-Options: Opioids PRN     PONV Management:  Adult Risk Factors:, Non-Smoker, Postop Opioids  Prevention: Ondansetron; Dexamethasone     CONSENT: Direct conversation   Plan and risks discussed with: Patient                            Paramjit Lyle MD

## 2018-11-05 NOTE — IP AVS SNAPSHOT
MRN:[id number]                      After Visit Summary   11/5/2018    Lee Mckenzie    MRN: [id number]           Thank you!     Thank you for choosing [facility] for your care. Our goal is always to provide you with excellent care. Hearing back from our patients is one way we can continue to improve our services. Please take a few minutes to complete the written survey that you may receive in the mail after you visit with us. Thank you!        Patient Information     Date Of Birth          1980        Designated Caregiver       Most Recent Value    Caregiver    Will someone help with your care after discharge? yes    Name of designated caregiver Jessie    Phone number of caregiver [phone]    Caregiver address [address], [place]      About your hospital stay     You were admitted on:  November 5, 2018 You last received care in the:  [facility]    You were discharged on:  November 6, 2018        Reason for your hospital stay       Spine surgery                  Who to Call     For medical emergencies, please call 911.  For non-urgent questions about your medical care, please call your primary care provider or clinic, [phone]  For questions related to your surgery, please call your surgery clinic        Attending Provider     Provider Charlie Davidson MD Orthopaedic Surgery       Primary Care Provider Office Phone # Fax #    Cassius Josue [phone]      After Care Instructions     Activity       Your activity upon discharge: Weight bearing as tolerated. No excessive or repetitive bending or twisting. No lifting >10 lbs x 6 weeks.            Diet       Follow this diet upon discharge: regular            Discharge Instructions       -Pain control: Take medication as prescribed, but only as often as necessary.  Narcotic pain medications have numerous side effects which can include nausea, constipation, drowsiness, itching, confusion,  and respiratory issues if you take too much. If you experience nausea, this may be relieved by taking the medication with food. To avoid constipation, you should take a stool softener. Do not drive or drink alcohol while you are taking pain medication. Remember that Tylenol can be used for pain relief as you wean off the narcotics. Do not exceed 4,000 mg of tylenol in 24 hours. Please ice the affected area as much as possible to reduce swelling. Swelling is one of the most common causes of pain after surgery. You may not take anti-inflammatory medications (i.e., Advil, Ibuprofen, Aleve) for the first 3 months after surgery as it interferes with bone healing.     -Activity: No excessive or repetitive bending, twisting or lifting. No lifting >10 lbs x 6 weeks. This means no sporting activities (such as running, tennis, bowling, golf, bicycling, etc.) until you are seen in clinic. We encourage you to be up and out of bed regularly. Please try to walk 30 minutes per day. If you have been prescribed a brace, please wear the brace when up and out of bed.    -Call our clinic at [phone] during regular office hours, or [phone] for the orthopedic resident on call after hours for questions - ON-CALL RESIDENTS DO NOT HAVE ABILITY TO PRESCRIBE NARCOTICS AFTER HOURS OR ON WEEKENDS. If narcotic pain medication prescriptions are required during these times, you will have to go to an Urgent Care or Emergency Department, or wait until clinic opens.    -Reasons to call clinic: Pain in your surgical area persists or worsens in the first few days after discharge; excessive redness or drainage of cloudy or bloody material from the wounds (some light drainage is often normal in the first week after surgery); drainage of any kind greater than one week after surgery; temperature elevation greater than 101.0 ; You have pain, swelling or redness in your calf that does not improve with elevation; new numbness or weakness in your leg  or foot which does not improve with repositioning.  Call your primary doctor or seek medical care if you have chest pain or shortness of breath.            Wound care and dressings       -Wound Care: Your incision was closed with sutures underneath the skin. Remove the dressings 3 days after surgery, then ok to shower. You may replace this with gauze and tape.  Change the bandages every other day and keep wound clean and dry. Steri-strips, glue, or clear strands of suture at the end of the incision are normal. Which ever is present, they should be left on until they fall off or are removed at your first office visit. Water may run over incision but no scrubbing. Do not submerge wound in water (pool, spa, sauna, lake, bathtub, etc.) for at least 4 weeks.                  Follow-up Appointments     Adult [facility] Follow-up and recommended labs and tests       Follow up at 6 weeks with Dr. Nixon and repeat xrays    Appointments on [place] and/or [facility] (with [facility] or [facility] provider or service). Call [phone] if you haven't heard regarding these appointments within 7 days of discharge.                  Your next 10 appointments already scheduled     Dec 18, 2018  8:45 AM CST   (Arrive by 8:15 AM)   RETURN SPINE with Charlie Nixon MD   [facility] ([facility])    [address] [place] [id number] [phone]            Jan 29, 2019  8:45 AM CST   (Arrive by 8:15 AM)   RETURN SPINE with Charlie Nixon MD   [facility] ([facility])    [address] [place] [id number] [phone]              Future tests that were ordered for you     Assign Questionnaire Series to Patient                 Pending Results     No orders found for last 3 day(s).            Statement of Approval     Ordered          11/06/18 7622  I have reviewed and agree with all the  "recommendations and orders detailed in this document.  EFFECTIVE NOW     Approved and electronically signed by:  Ursula Duke APRN CNP             Admission Information     Date & Time Provider Department Dept. Phone    2018 Charlie Nixon MD [facility] [phone]      Your Vitals Were     Blood Pressure Pulse Temperature Respirations Height Weight    112/85 (BP Location: Left arm) 93 98.6  F (37  C) (Oral) 16 1.727 m (5' 8\") 82.8 kg (182 lb 8.7 oz)    Pulse Oximetry BMI (Body Mass Index)                97% 27.76 kg/m2          MyChart Information     [employer] lets you send messages to your doctor, view your test results, renew your prescriptions, schedule appointments and more. To sign up, go to [email or web address] . Click on \"Log in\" on the left side of the screen, which will take you to the Welcome page. Then click on \"Sign up Now\" on the right side of the page.     You will be asked to enter the access code listed below, as well as some personal information. Please follow the directions to create your username and password.     Your access code is: [email or web address]  Expires: 2019  6:43 AM     Your access code will  in 90 days. If you need help or a new code, please call your [facility] clinic or [phone].        Care EveryWhere ID     This is your Care EveryWhere ID. This could be used by other organizations to access your [facility] medical records  [id number]        Equal Access to Services     [facility] Hospital[facility] : Hadii violet mills hadasho Sofangali, waaxda luqadaha, qaybta kaalmada adeegyada, velasquez melara. So [facility] [phone].    ATENCIÓN: Si habla español, tiene a gilliam disposición servicios gratuitos de asistencia lingüística. Llame al [phone].    We comply with applicable federal civil rights laws and [place] laws. We do not discriminate on the basis of race, color, national origin, age, disability, sex, sexual orientation, or gender " identity.               Review of your medicines      START taking        Dose / Directions    acetaminophen 325 MG tablet   Commonly known as:  TYLENOL   Used for:  Degeneration of lumbar or lumbosacral intervertebral disc        Dose:  975 mg   Take 3 tablets (975 mg) by mouth every 8 hours   Quantity:  100 tablet   Refills:  1       docusate sodium 100 MG capsule   Commonly known as:  COLACE   Used for:  Degeneration of lumbar or lumbosacral intervertebral disc        Dose:  100 mg   Take 1 capsule (100 mg) by mouth 2 times daily as needed for constipation (no stool in 24 hrs)   Quantity:  60 capsule   Refills:  1       hydrOXYzine 25 MG tablet   Commonly known as:  ATARAX   Used for:  Degeneration of lumbar or lumbosacral intervertebral disc        Dose:  25-50 mg   Take 1-2 tablets (25-50 mg) by mouth every 6 hours as needed for itching   Quantity:  60 tablet   Refills:  1       methocarbamol 750 MG tablet   Commonly known as:  ROBAXIN   Used for:  Degeneration of lumbar or lumbosacral intervertebral disc        Dose:  750 mg   Take 1 tablet (750 mg) by mouth every 6 hours as needed for muscle spasms   Quantity:  80 tablet   Refills:  1       oxyCODONE IR 10 MG tablet   Commonly known as:  ROXICODONE   Used for:  Degeneration of lumbar or lumbosacral intervertebral disc        Dose:  10-15 mg   Take 1-1.5 tablets (10-15 mg) by mouth every 3 hours as needed for moderate to severe pain   Quantity:  100 tablet   Refills:  0         CONTINUE these medicines which have NOT CHANGED        Dose / Directions    ALEVE PO        Dose:  220-440 mg   Take 220-440 mg by mouth as needed   Refills:  0       diclofenac 1 % Gel topical gel   Commonly known as:  VOLTAREN        Place onto the skin as needed   Refills:  0       HYDROcodone-acetaminophen 5-325 MG per tablet   Commonly known as:  NORCO        Dose:  1-2 tablet   Take 1-2 tablets by mouth as needed for severe pain   Refills:  0            Where to get your medicines       These medications were sent to [facility] [place], [place] - [address]  [address], [facility] [address]     Phone:  [phone]     acetaminophen 325 MG tablet    docusate sodium 100 MG capsule    hydrOXYzine 25 MG tablet    methocarbamol 750 MG tablet         Some of these will need a paper prescription and others can be bought over the counter. Ask your nurse if you have questions.     Bring a paper prescription for each of these medications     oxyCODONE IR 10 MG tablet                Protect others around you: Learn how to safely use, store and throw away your medicines at www.disposemymeds.org.        Information about OPIOIDS     PRESCRIPTION OPIOIDS: WHAT YOU NEED TO KNOW   We gave you an opioid (narcotic) pain medicine. It is important to manage your pain, but opioids are not always the best choice. You should first try all the other options your care team gave you. Take this medicine for as short a time (and as few doses) as possible.    Some activities can increase your pain, such as bandage changes or therapy sessions. It may help to take your pain medicine 30 to 60 minutes before these activities. Reduce your stress by getting enough sleep, working on hobbies you enjoy and practicing relaxation or meditation. Talk to your care team about ways to manage your pain beyond prescription opioids.    These medicines have risks:    DO NOT drive when on new or higher doses of pain medicine. These medicines can affect your alertness and reaction times, and you could be arrested for driving under the influence (DUI). If you need to use opioids long-term, talk to your care team about driving.    DO NOT operate heavy machinery    DO NOT do any other dangerous activities while taking these medicines.    DO NOT drink any alcohol while taking these medicines.     If the opioid prescribed includes acetaminophen, DO NOT take with any other medicines that contain acetaminophen.  Read all labels carefully. Look for the word  acetaminophen  or  Tylenol.  Ask your pharmacist if you have questions or are unsure.    You can get addicted to pain medicines, especially if you have a history of addiction (chemical, alcohol or substance dependence). Talk to your care team about ways to reduce this risk.    All opioids tend to cause constipation. Drink plenty of water and eat foods that have a lot of fiber, such as fruits, vegetables, prune juice, apple juice and high-fiber cereal. Take a laxative (Miralax, milk of magnesia, Colace, Senna) if you don t move your bowels at least every other day. Other side effects include upset stomach, sleepiness, dizziness, throwing up, tolerance (needing more of the medicine to have the same effect), physical dependence and slowed breathing.    Store your pills in a secure place, locked if possible. We will not replace any lost or stolen medicine. If you don t finish your medicine, please throw away (dispose) as directed by your pharmacist. The [place] Pollution Control Agency has more information about safe disposal: https://www.pca.[email or web address]/living-green/managing-unwanted-medications             Medication List: This is a list of all your medications and when to take them. Check marks below indicate your daily home schedule. Keep this list as a reference.      Medications           Morning Afternoon Evening Bedtime As Needed    acetaminophen 325 MG tablet   Commonly known as:  TYLENOL   Take 3 tablets (975 mg) by mouth every 8 hours   Last time this was given:  975 mg on 11/6/2018  6:07 AM                                ALEVE PO   Take 220-440 mg by mouth as needed                                diclofenac 1 % Gel topical gel   Commonly known as:  VOLTAREN   Place onto the skin as needed                                docusate sodium 100 MG capsule   Commonly known as:  COLACE   Take 1 capsule (100 mg) by mouth 2 times daily as needed for constipation (no  stool in 24 hrs)   Last time this was given:  100 mg on 11/6/2018  8:01 AM                                HYDROcodone-acetaminophen 5-325 MG per tablet   Commonly known as:  NORCO   Take 1-2 tablets by mouth as needed for severe pain                                hydrOXYzine 25 MG tablet   Commonly known as:  ATARAX   Take 1-2 tablets (25-50 mg) by mouth every 6 hours as needed for itching   Last time this was given:  25 mg on 11/5/2018  3:12 PM                                methocarbamol 750 MG tablet   Commonly known as:  ROBAXIN   Take 1 tablet (750 mg) by mouth every 6 hours as needed for muscle spasms   Last time this was given:  750 mg on 11/5/2018 10:50 PM                                oxyCODONE IR 10 MG tablet   Commonly known as:  ROXICODONE   Take 1-1.5 tablets (10-15 mg) by mouth every 3 hours as needed for moderate to severe pain   Last time this was given:  15 mg on 11/6/2018 11:12 AM

## 2018-11-05 NOTE — BRIEF OP NOTE
[facility], [place]    Brief Operative Note    Pre-operative diagnosis: Degenerative Disc Disease Lumabr -Sacral 1; Right Sacral 1 Radiculopathy, Degeneration Of Lumbar OR Lumbosacral Intervertebral  Disc, Osteoarthritis Of Spine with Radiculopathy, Sacral and Sacrococcygeal Region.  Post-operative diagnosis Same   Procedure: Procedure(s):  Part 1:  (supine) Anterior Lumbar Interbody Fusion Lumbar 5-Sacral 1; Use Of rhBMP-2     Part 2:  (prone) Stealth Guided Pedicle Screw Fixation Lumbar 5 -Sacral 1; Right Hemilaminectomy-Discectomy Lumbar 5-Sacral 1  Surgeon: Surgeon(s) and Role:  Panel 1:     * Charlie Nixon MD - Primary     * Davin Greenwood MD - Resident - Assisting       * Liane Merino MD - Assisting     * Mateo Ortega MD - Assisting    Panel 2:     * Charlie Nixon MD - Primary     * Davin Greenwood MD - Resident - Assisting    Anesthesia: General   Estimated blood loss: 75cc   Drains: None  Specimens: * No specimens in log *  Findings:   None.  Complications: None.  Implants: Titanium interbody cage with BMP and a single screw. Single level posterior instrumented fusion     Assessment and Plan: Lee Mckenzie is a 37 year old male s/p ALIF L5-S1 and minimally invasive PISF L5-S1 with decompression on the right on 11/5.     Ortho Primary  Activity: Up with assist until independent. No excessive bending or twisting. No lifting >10 lbs x 6 weeks. No Rei lift for transfers.   Weight bearing status: WBAT.  Pain management: Transition from IV to PO as tolerated.    Antibiotics: Ancef x 24 hours.  Diet: NPO until bowel sounds, then advance to clears. Advance to regular diet when return of flatus.    DVT prophylaxis: SCDs only. No chemical DVT ppx needed.   Imaging: XR Upright Lumbar PTDC - ordered.  Labs: Hgb POD#1-2.  Bracing/Splinting: None.  Dressings: Dressing change on POD#3, then ok to shower.  Drains: None   Wong  catheter: Removed POD#1.   Physical Therapy/Occupational Therapy: Eval and treat.  Consults: Hospitalist.  Follow-up: Clinic with Dr. Nixon in 6 weeks with x-rays   Disposition: Pending progress with therapies, pain control on orals, and medical stability, anticipate discharge to home on POD #1-2    Davin Greenwood MD  Orthopedic Surgery, PGY-4  Pager: [phone]     For questions about this patient during weekday business hours, please attempt to contact me at my pager prior to contacting the Orthopaedic Surgery resident on call. On the weekends and overnight, please page the Orthopaedic Surgery resident on call. Thank you!

## 2018-11-05 NOTE — ANESTHESIA CARE TRANSFER NOTE
Patient: Lee Mckenzie    Procedure(s):  Part 1:  (supine) Anterior Lumbar Interbody Fusion Lumbar 5-Sacral 1; Use Of rhBMP-2     Part 2:  (prone) Stealth Guided Pedicle Screw Fixation Lumbar 5 -Sacral 1; Right Hemilaminectomy-Discectomy Lumbar 5-Sacral 1    Diagnosis: Degenerative Disc Disease Lumabr -Sacral 1; Right Sacral 1 Radiculopathy, Degeneration Of Lumbar OR Lumbosacral Intervertebral  Disc, Osteoarthritis Of Spine with Radiculopathy, Sacral and Sacrococcygeal Region.  Diagnosis Additional Information: No value filed.    Anesthesia Type:   No value filed.     Note:  Airway :Face Mask  Patient transferred to:PACU  Handoff Report: Identifed the Patient, Identified the Reponsible Provider, Reviewed the pertinent medical history, Discussed the surgical course, Reviewed Intra-OP anesthesia mangement and issues during anesthesia, Set expectations for post-procedure period and Allowed opportunity for questions and acknowledgement of understanding      Vitals: (Last set prior to Anesthesia Care Transfer)    CRNA VITALS  11/5/2018 1210 - 11/5/2018 1250      11/5/2018             Pulse: 95    SpO2: 99 %                Electronically Signed By: ABDI Hudson CRNA  November 5, 2018  12:50 PM

## 2018-11-05 NOTE — OP NOTE
Procedure Date: November 5, 2018      DATE OF OPERATION: November 5, 2018      CO-SURGEONS:  Charlie Nixon MD; Liane Merino MD.       ASSISTANT: Mateo Ortega MD      ANESTHESIA:  General endotracheal anesthesia.       PREOPERATIVE DIAGNOSIS:  , L5-S1 Degenerative Disc Disease      POSTOPERATIVE DIAGNOSIS:  L5-S1 Degenerative disc Disease      PROCEDURE:     1.  L5-S1 Anterior Lumbar Interbody Fusion  2.  Left retroperitoneal exposure.       ESTIMATED BLOOD LOSS:  <50 mL.       COMPLICATIONS:  None apparent.       INDICATIONS:  This 37 year old male saw Dr. Nixon of Orthopedic Spine Surgery for evaluation.  Treatment was recommended, which included anterior exposure for the L5-S1 disk spaces.  I was consulted for assistance for the anterior approach via retroperitoneal exposure.       DESCRIPTION OF PROCEDURE:  The patient was brought to the operating room, placed in the supine position.  After monitors were placed general anesthesia was achieved.  A left paramedian incision was created and the anterior rectus sheath incised.  The left rectus muscle was then retracted laterally and the posterior sheath entered in a vertical fashion.  The retroperitoneal space was entered and reflected toward the right.  A self-retaining radiolucent retractor was then placed.  The L5-S1 disk space was then encountered and the middle sacral artery divided between Hemoclips.  The disk space between L5 and S1, the L5 vertebral body, and S1 vertebral body were then cleared cephalad and caudally as well as laterally. The disk and fusion portion as well as the instrumentation for the L5-S1 disk space will then be dictated by Dr. Nixon.  The retractors were released and hemostasis appeared adequate.  At this point, when excellent hemostasis was achieved a small peritoneal rent was repaired with a running 3-0 Vicryl suture.  The area was again inspected for hemostasis and when this was assured, the posterior sheath was closed with  looped #1 PDS from the cephalad and caudal positions.  Interrupted 3-0 Vicryl sutures were then used for deep dermal followed by 4-0 subcuticular Monocryl followed by skin glue.  Prior to closure, 50 mL of 0.25% Marcaine was injected into the incision.  At this point, the remainder of the operation and fusion will be dictated by Dr. Nixon.  When I left the operating room, the patient was hemodynamically stable with palpable left DP and PT with pneumatic compression devices in place.         Liane Merino MD, FACS, RPVI  Director, [facility]  Chief, Vascular and Endovascular Surgery  [facility]  Cell: [phone]  [email or web address]

## 2018-11-05 NOTE — CONSULTS
[facility], [place]    Hospitalist Consultation    Date of Admission:  11/5/2018  Date of Consult (When I saw the patient): 11/05/18    Assessment & Plan   Lee Mckenzie is a 37 year old male who was admitted on 11/5/2018 after elective L5-S1 Anterior Lumbar Interbody Fusion for L5S1 degenerative disc disease.     1.  L5-S1 Anterior Lumbar Interbody Fusion for L5S1 degenerative disc disease: Plan per ortho  2.  He is Co Pain in anterior bilateral thighs. Likely related to surgery. Watch and see how he does.  3.  He is otherwise healthy. Doing well. Encourage to drink fluids, do ankle exercises, do is  4.  Ho Tobacco use and marijuana use: Recommend cessation.       DVT Prophylaxis: Defer to primary service  Code Status: Full Code    Disposition:  Per primary    Karin Jarquin MD     Reason for Consult   Reason for consult: I was asked by Dr Cook to evaluate this patient for post operative medical comanagment.    Primary Care Physician   BRADEN RUIZ    Chief Complaint     SP  L5-S1 Anterior Lumbar Interbody Fusion for L5S1 degenerative disc disease.       History is obtained from the patient    History of Present Illness     Lee Mckenzie is a 37 year healty  Male had chronic back pain from L5S1 degenerative disc disease. Underwent L5S1 interbody fusion. He has some anterior thigh pain which is bad. He is sleeping on right lateral side. No tingling or numbness in the legs.    He has no nausea or vomiting. No sob or chest pain.     Past Medical History    I have reviewed this patient's medical history and updated it with pertinent information if needed.   Past Medical History:   Diagnosis Date     Disc degeneration, lumbar      Osteoarthritis      Tobacco abuse        Past Surgical History   I have reviewed this patient's surgical history and updated it with pertinent information if needed.  Past Surgical History:   Procedure Laterality Date     oral surgery         Prior  to Admission Medications   Prior to Admission Medications   Prescriptions Last Dose Informant Patient Reported? Taking?   HYDROcodone-acetaminophen (NORCO) 5-325 MG per tablet 11/5/2018 at 0300  Yes Yes   Sig: Take 1-2 tablets by mouth as needed for severe pain    Naproxen Sodium (ALEVE PO) Past Month at Unknown time  Yes Yes   Sig: Take 220-440 mg by mouth as needed    diclofenac (VOLTAREN) 1 % GEL topical gel Past Month at Unknown time  Yes Yes   Sig: Place onto the skin as needed       Facility-Administered Medications: None     Allergies   Not on File    Social History   I have reviewed this patient's social history and updated it with pertinent information if needed. Lee Mckenzie  reports that he has been smoking.  He has a 25.00 pack-year smoking history. He has never used smokeless tobacco. He reports that he drinks about 3.6 oz of alcohol per week  He reports that he uses illicit drugs, including Marijuana.    Family History      I have reviewed this patient's family history and updated it with pertinent information if needed.   History reviewed. No pertinent family history. No ho DVT    Review of Systems   The 10 point Review of Systems is negative other than noted in the HPI or here.     Physical Exam   Temp: 98.7  F (37.1  C) Temp src: Oral BP: 121/78 Pulse: 95 Heart Rate: 83 Resp: 12 SpO2: 96 % O2 Device: None (Room air) Oxygen Delivery: 2 LPM  Vital Signs with Ranges  Temp:  [97.5  F (36.4  C)-98.7  F (37.1  C)] 98.7  F (37.1  C)  Pulse:  [95] 95  Heart Rate:  [51-94] 83  Resp:  [6-15] 12  BP: (108-143)/(70-96) 121/78  SpO2:  [92 %-100 %] 96 %  182 lbs 8.65 oz    Constitutional:  Awake, alert, cooperative, no apparent distress.  Eyes: Conjunctiva and pupils examined and normal.  HEENT: Moist mucous membranes, normal dentition.  Respiratory: Clear to auscultation bilaterally, no crackles or wheezing.  Cardiovascular: Regular rate and rhythm, normal S1 and S2, and no murmur noted.  GI: Soft,  non-distended, non-tender, normal bowel sounds.  Lymph/Hematologic: No anterior cervical or supraclavicular adenopathy.  Skin: No rashes, no cyanosis, no edema.  Musculoskeletal: SP Back surgery  Neurologic: good sensation in the feet and good strength in the ankles noted.   Psychiatric: Alert, oriented to person, place and time, no obvious anxiety or depression.    Data   -Data reviewed today: All pertinent laboratory and imaging results from this encounter were reviewed. I personally reviewed no images or EKG's today.    Recent Labs  Lab 11/05/18  0611   HGB 13.9   *       Recent Results (from the past 24 hour(s))   XR Surgery KONSTANTIN L/T 5 Min Fluoro w Stills    Narrative    Intraoperative fluoroscopic radiograph, sacral spine 11/5/2018 8:42 AM       HISTORY: Presenting for lumbar interbody fusion L5-S1    COMPARISON: 9/6/2018, MRI 11/19/2017    FINDINGS: Single intraoperative radiograph of the lumbosacral spine  obtained at 9:42 AM. There is a clamp projecting over the superior  endplate L5. The additional marker with a needle is projected over the  L5-S1 disc space.      Impression    IMPRESSION: Intraoperative disc space localization. Needle is  projected over the L5-S1 disc space.    I have personally reviewed the examination and initial interpretation  and I agree with the findings.    AKILAH MADERA MD   XR Surgery KONSTANTIN L/T 5 Min Fluoro    Narrative    This exam was marked as non-reportable because it will not be read by a   radiologist or a [place] non-radiologist provider.

## 2018-11-05 NOTE — OR NURSING
PACU to Inpatient Nursing Handoff    Patient Lee Mckenzie is a 37 year old male who speaks English.   Procedure Procedure(s):  Part 1:  (supine) Anterior Lumbar Interbody Fusion Lumbar 5-Sacral 1; Use Of rhBMP-2     Part 2:  (prone) Stealth Guided Pedicle Screw Fixation Lumbar 5 -Sacral 1; Right Hemilaminectomy-Discectomy Lumbar 5-Sacral 1   Surgeon(s) Primary: Charlie Nixon MD  Assisting: Liane Merino MD; Mateo Ortega MD  Resident - Assisting: Davin Greenwood MD     No Known Allergies    Isolation  [unfilled]     Past Medical History   has a past medical history of Disc degeneration, lumbar; Osteoarthritis; and Tobacco abuse.    Anesthesia General   Dermatome Level     Preop Meds Not applicable   Nerve block Not applicable   Intraop Meds dexamethasone (Decadron)  fentanyl (Sublimaze): 200 mcg total  hydromorphone (Dilaudid): 1.5 mg total  ketorolac (Toradol): last given at 1222  ondansetron (Zofran): last given at 1215   Local Meds Yes   Antibiotics cefazolin (Ancef) - last given at 1106     Pain Patient Currently in Pain: yes  Comfort: tolerable with discomfort  Pain Control: partially effective   PACU meds  acetaminophen (Tylenol): 975 mg (total dose) last given at 144?   fentanyl (Sublimaze): 100 mcg (total dose) last given at 1323   hydromorphone (Dilaudid): 1.5 mg (total dose) last given at 1407   hydroxizine (Vistaril/Atarax): 50 mg (total dose) last given at 1515    PCA / epidural No   Capnography Respiratory Monitoring (EtCO2): 44 mmHg  Integrated Pulmonary Index (IPI): 8-9   Telemetry ECG Rhythm: Normal sinus rhythm   Inpatient Telemetry Monitor Ordered? No        Labs Glucose Lab Results   Component Value Date     11/05/2018       Hgb Lab Results   Component Value Date    HGB 13.9 11/05/2018       INR Lab Results   Component Value Date    INR 0.96 10/08/2018      PACU Imaging Not applicable     Wound/Incision Incision/Surgical Site 11/05/18 Abdomen (Active)    Incision Assessment UTV 11/5/2018  2:00 PM   Closure Approximated;Liquid bandage;Sutures 11/5/2018 12:21 PM   Incision Drainage Amount None 11/5/2018  2:00 PM   Dressing Intervention Clean, dry, intact 11/5/2018  2:00 PM   Number of days:0       Incision/Surgical Site 11/05/18 Back (Active)   Incision Assessment UTV 11/5/2018  2:00 PM   Closure Approximated;Liquid bandage;Sutures 11/5/2018 12:21 PM   Incision Drainage Amount Small 11/5/2018  2:00 PM   Drainage Description Serosanguinous 11/5/2018  2:00 PM   Dressing Intervention Dressing marked 11/5/2018  2:00 PM   Number of days:0      CMS Peripheral Neurovascular WDL: WDL (11/05/18 1242)  All Extremities Temperature: warm (11/05/18 1330)  All Extremities Color: no discoloration (11/05/18 1330)  All Extremities Sensation: no numbness;no tingling (11/05/18 1330)  LLE Temperature: warm (11/05/18 1330)  LLE Color: no discoloration (11/05/18 1330)  LLE Sensation: no numbness;no tingling (11/05/18 1330)  RLE Temperature: warm (11/05/18 1330)  RLE Color: no discoloration (11/05/18 1330)  RLE Sensation: no numbness;no tingling (11/05/18 1330)   Equipment ice pack   Other LDA       IV Access Peripheral IV 11/05/18 Left Hand (Active)   Site Assessment [facility] 11/5/2018  2:00 PM   Line Status Infusing 11/5/2018  2:00 PM   Phlebitis Scale 0-->no symptoms 11/5/2018  2:00 PM   Infiltration Scale 0 11/5/2018 12:42 PM   Infiltration Site Treatment Method  None 11/5/2018  6:28 AM   Extravasation? No 11/5/2018  6:28 AM   Dressing Intervention New dressing  11/5/2018  6:28 AM   Number of days:0       Peripheral IV 11/05/18 Right Hand (Active)   Site Assessment [facility] 11/5/2018  2:00 PM   Line Status Saline locked 11/5/2018  2:00 PM   Phlebitis Scale 0-->no symptoms 11/5/2018  2:00 PM   Infiltration Scale 0 11/5/2018 12:42 PM   Number of days:0      Blood Products Not applicable EBL 75   mL   Intake/Output Date 11/05/18 0700 - 11/06/18 0659   Shift [id number] 1024-1204 0813-3393 24 Hour  Total   I  N  T  A  K  E   I.V. 1300   1300    Shift Total  (mL/kg) 1300  (15.7)   1300  (15.7)   O  U  T  P  U  T   Urine 450   450    Blood 75   75    Shift Total  (mL/kg) 525  (6.34)   525  (6.34)   Weight (kg) 82.8 82.8 82.8 82.8        Drains / Wong Urethral Catheter Latex 16 fr (Active)   Tube Description UTV 11/5/2018  2:00 PM   Collection Container Standard 11/5/2018  2:00 PM   Securement Method Securing device (Describe) 11/5/2018  2:00 PM   Rationale for Continued Use Anesthesia 11/5/2018 12:42 PM   Number of days:0      Time of void PreOp Void Prior to Procedure: 0500 (11/05/18 0602)    PostOp      Diapered? No   Bladder Scan     PO    tolerating sips     Vitals    B/P: 122/84  T: 97.9  F (36.6  C)    Temp src: Axillary  P:  Pulse: 95 (11/05/18 0500)    Heart Rate: 73 (11/05/18 1515)     R: 8  O2:  SpO2: 99 %    O2 Device: Nasal cannula (11/05/18 1445)    Oxygen Delivery: 2 LPM (11/05/18 1445)         Family/support present significant other   Patient belongings Patient Belongings: clothing;shoes  Disposition of Belongings: Other (see comment) (labeled and secured)   Patient transported on bed   DC meds/scripts (obs/outpt) Not applicable   Inpatient Pain Meds Released? Yes       Special needs/considerations None   Tasks needing completion None       Ivonne Abraham RN  ASCOM [id number]

## 2018-11-05 NOTE — PROGRESS NOTES
"Orthopedics Post Op Check    Procedure: anterior/posterior L5-S1 fusion     S: Patient seen on the floor. Significant anterior thigh pain, back pain manageable. Tolerating clears, has not noticed flatus. No nausea or vomiting. No chest pain or shortness of breath.     O: /78  Pulse 95  Temp 98.7  F (37.1  C) (Oral)  Resp 12  Ht 1.727 m (5' 8\")  Wt 82.8 kg (182 lb 8.7 oz)  SpO2 96%  BMI 27.76 kg/m2    No acute distress  Non-labored respirations  BLE: drainage on left dressing, others c,d,i. 5/5 ehl/fhl/df/pf/knee extension, sensation intact in L4-S1 nerve distributions, feet warm and perfused.       Assessment and Plan: Lee Mckenzie is a 37 year old male s/p ALIF L5-S1 and minimally invasive PISF L5-S1 with decompression on the right on 11/5.      Ortho Primary  Activity: Up with assist until independent. No excessive bending or twisting. No lifting >10 lbs x 6 weeks. No Rei lift for transfers.   Weight bearing status: WBAT.  Pain management: Transition from IV to PO as tolerated.  IV toradol scheduled. PRN Robaxin  Antibiotics: Ancef x 24 hours.  Diet: clears. Advance to regular diet when return of flatus.    DVT prophylaxis: SCDs only. No chemical DVT ppx needed.   Imaging: XR Upright Lumbar PTDC - ordered.  Labs: Hgb POD#1-2.  Bracing/Splinting: None.  Dressings: Dressing change on POD#3, then ok to shower.  Drains: None   Wong catheter: Removed POD#1.   Physical Therapy/Occupational Therapy: Eval and treat.  Consults: Hospitalist.  Follow-up: Clinic with Dr. Nixon in 6 weeks with x-rays   Disposition: Pending progress with therapies, pain control on orals, and medical stability, anticipate discharge to home on POD #1-2     Davin Greenwood MD  Orthopedic Surgery, PGY-4  Pager: [phone]     "

## 2018-11-05 NOTE — IP AVS SNAPSHOT
[facility] [address]    [address]    [facility] [place] [place] [address]    Phone:  [phone]                                       After Visit Summary   11/5/2018    Lee Mckenzie    MRN: [id number]           After Visit Summary Signature Page     I have received my discharge instructions, and my questions have been answered. I have discussed any challenges I see with this plan with the nurse or doctor.    ..........................................................................................................................................  Patient/Patient Representative Signature      ..........................................................................................................................................  Patient Representative Print Name and Relationship to Patient    ..................................................               ................................................  Date                                   Time    ..........................................................................................................................................  Reviewed by Signature/Title    ...................................................              ..............................................  Date                                               Time          22EPIC Rev 08/18

## 2018-11-06 ENCOUNTER — PATIENT OUTREACH (OUTPATIENT)
Dept: CARE COORDINATION | Facility: CLINIC | Age: 38
End: 2018-11-06

## 2018-11-06 ENCOUNTER — APPOINTMENT (OUTPATIENT)
Dept: PHYSICAL THERAPY | Facility: CLINIC | Age: 38
DRG: 460 | End: 2018-11-06
Attending: ORTHOPAEDIC SURGERY
Payer: COMMERCIAL

## 2018-11-06 ENCOUNTER — APPOINTMENT (OUTPATIENT)
Dept: GENERAL RADIOLOGY | Facility: CLINIC | Age: 38
DRG: 460 | End: 2018-11-06
Attending: ORTHOPAEDIC SURGERY
Payer: COMMERCIAL

## 2018-11-06 VITALS
HEIGHT: 68 IN | HEART RATE: 93 BPM | OXYGEN SATURATION: 97 % | RESPIRATION RATE: 16 BRPM | BODY MASS INDEX: 27.67 KG/M2 | DIASTOLIC BLOOD PRESSURE: 85 MMHG | WEIGHT: 182.54 LBS | SYSTOLIC BLOOD PRESSURE: 112 MMHG | TEMPERATURE: 98.6 F

## 2018-11-06 LAB
ANION GAP SERPL CALCULATED.3IONS-SCNC: 5 MMOL/L (ref 3–14)
BUN SERPL-MCNC: 14 MG/DL (ref 7–30)
CALCIUM SERPL-MCNC: 8.8 MG/DL (ref 8.5–10.1)
CHLORIDE SERPL-SCNC: 104 MMOL/L (ref 94–109)
CO2 SERPL-SCNC: 30 MMOL/L (ref 20–32)
CREAT SERPL-MCNC: 0.89 MG/DL (ref 0.66–1.25)
GFR SERPL CREATININE-BSD FRML MDRD: >90 ML/MIN/1.7M2
GLUCOSE SERPL-MCNC: 109 MG/DL (ref 70–99)
HGB BLD-MCNC: 13.7 G/DL (ref 13.3–17.7)
POTASSIUM SERPL-SCNC: 3.8 MMOL/L (ref 3.4–5.3)
SODIUM SERPL-SCNC: 139 MMOL/L (ref 133–144)

## 2018-11-06 PROCEDURE — 72100 X-RAY EXAM L-S SPINE 2/3 VWS: CPT

## 2018-11-06 PROCEDURE — 36415 COLL VENOUS BLD VENIPUNCTURE: CPT | Performed by: ORTHOPAEDIC SURGERY

## 2018-11-06 PROCEDURE — 80048 BASIC METABOLIC PNL TOTAL CA: CPT | Performed by: ORTHOPAEDIC SURGERY

## 2018-11-06 PROCEDURE — 97161 PT EVAL LOW COMPLEX 20 MIN: CPT | Mod: GP

## 2018-11-06 PROCEDURE — 97530 THERAPEUTIC ACTIVITIES: CPT | Mod: GP

## 2018-11-06 PROCEDURE — 25000128 H RX IP 250 OP 636: Performed by: STUDENT IN AN ORGANIZED HEALTH CARE EDUCATION/TRAINING PROGRAM

## 2018-11-06 PROCEDURE — 85018 HEMOGLOBIN: CPT | Performed by: ORTHOPAEDIC SURGERY

## 2018-11-06 PROCEDURE — 40000193 ZZH STATISTIC PT WARD VISIT

## 2018-11-06 PROCEDURE — 25000132 ZZH RX MED GY IP 250 OP 250 PS 637: Performed by: STUDENT IN AN ORGANIZED HEALTH CARE EDUCATION/TRAINING PROGRAM

## 2018-11-06 PROCEDURE — 99207 ZZC CDG-MDM COMPONENT: MEETS LOW - DOWN CODED: CPT | Performed by: INTERNAL MEDICINE

## 2018-11-06 PROCEDURE — 99231 SBSQ HOSP IP/OBS SF/LOW 25: CPT | Performed by: INTERNAL MEDICINE

## 2018-11-06 RX ORDER — DOCUSATE SODIUM 100 MG/1
100 CAPSULE, LIQUID FILLED ORAL 2 TIMES DAILY PRN
Qty: 60 CAPSULE | Refills: 1 | Status: SHIPPED | OUTPATIENT
Start: 2018-11-06

## 2018-11-06 RX ORDER — METHOCARBAMOL 750 MG/1
750 TABLET, FILM COATED ORAL EVERY 6 HOURS PRN
Qty: 80 TABLET | Refills: 1 | Status: SHIPPED | OUTPATIENT
Start: 2018-11-06

## 2018-11-06 RX ORDER — HYDROXYZINE HYDROCHLORIDE 25 MG/1
25-50 TABLET, FILM COATED ORAL EVERY 6 HOURS PRN
Qty: 60 TABLET | Refills: 1 | Status: SHIPPED | OUTPATIENT
Start: 2018-11-06

## 2018-11-06 RX ORDER — OXYCODONE HYDROCHLORIDE 10 MG/1
10-15 TABLET ORAL
Qty: 100 TABLET | Refills: 0 | Status: SHIPPED | OUTPATIENT
Start: 2018-11-06 | End: 2018-11-13

## 2018-11-06 RX ORDER — ACETAMINOPHEN 325 MG/1
975 TABLET ORAL EVERY 8 HOURS
Qty: 100 TABLET | Refills: 1 | Status: SHIPPED | OUTPATIENT
Start: 2018-11-06

## 2018-11-06 RX ADMIN — OXYCODONE HYDROCHLORIDE 15 MG: 5 TABLET ORAL at 08:00

## 2018-11-06 RX ADMIN — OXYCODONE HYDROCHLORIDE 15 MG: 5 TABLET ORAL at 03:55

## 2018-11-06 RX ADMIN — ACETAMINOPHEN 975 MG: 325 TABLET, FILM COATED ORAL at 06:07

## 2018-11-06 RX ADMIN — CEFAZOLIN SODIUM 1 G: 1 INJECTION, POWDER, FOR SOLUTION INTRAMUSCULAR; INTRAVENOUS at 03:55

## 2018-11-06 RX ADMIN — KETOROLAC TROMETHAMINE 30 MG: 30 INJECTION, SOLUTION INTRAMUSCULAR at 06:08

## 2018-11-06 RX ADMIN — OXYCODONE HYDROCHLORIDE 15 MG: 5 TABLET ORAL at 11:12

## 2018-11-06 RX ADMIN — OXYCODONE HYDROCHLORIDE 15 MG: 5 TABLET ORAL at 00:03

## 2018-11-06 RX ADMIN — KETOROLAC TROMETHAMINE 30 MG: 30 INJECTION, SOLUTION INTRAMUSCULAR at 00:33

## 2018-11-06 RX ADMIN — DOCUSATE SODIUM 100 MG: 100 CAPSULE, LIQUID FILLED ORAL at 08:01

## 2018-11-06 ASSESSMENT — ACTIVITIES OF DAILY LIVING (ADL)
ADLS_ACUITY_SCORE: 11

## 2018-11-06 NOTE — PROGRESS NOTES
11/06/18 1010   Quick Adds   Type of Visit Initial PT Evaluation       Present no   Living Environment   Lives With spouse   Living Arrangements house   Home Accessibility bed and bath on same level   Number of Stairs to Enter Home 3   Number of Stairs Within Home 0   Stair Railings at Home none   Transportation Available car   Living Environment Comment PT: pt w/ 3 dogs, more stairs to go out to backyard but does not need to use   Self-Care   Dominant Hand right   Usual Activity Tolerance good   Current Activity Tolerance good   Regular Exercise yes   Activity/Exercise Type other (see comments)  (Pt works as  full time)   Exercise Amount/Frequency 5-7 times/wk   Equipment Currently Used at Home cane, straight  (pt has, does not use)   Functional Level Prior   Ambulation 0-->independent   Transferring 0-->independent   Toileting 0-->independent   Bathing 0-->independent   Dressing 0-->independent   Eating 0-->independent   Communication 0-->understands/communicates without difficulty   Swallowing 0-->swallows foods/liquids without difficulty   Cognition 0 - no cognition issues reported   Fall history within last six months yes   Number of times patient has fallen within last six months (pt reports tripping on things at work and R knee buckling)   Which of the above functional risks had a recent onset or change? ambulation;fall history   Prior Functional Level Comment Independent; Trips alot. Knees buckle and falls.   General Information   Onset of Illness/Injury or Date of Surgery - Date 11/05/18   Referring Physician Charlie Nixon MD   Patient/Family Goals Statement PT: to return home and to decrease pain w/ movement   Pertinent History of Current Problem (include personal factors and/or comorbidities that impact the POC) PT: pt s/p L5-L1 lumbar interbody fusion.   Precautions/Limitations spinal precautions   Weight-Bearing Status - LUE full weight-bearing   Weight-Bearing  "Status - RUE full weight-bearing   Weight-Bearing Status - LLE weight-bearing as tolerated   Weight-Bearing Status - RLE weight-bearing as tolerated   General Observations PT: pt seated up in bed when arrived, wife present   Cognitive Status Examination   Orientation orientation to person, place and time   Level of Consciousness alert   Follows Commands and Answers Questions 100% of the time   Personal Safety and Judgment intact   Memory intact   Pain Assessment   Patient Currently in Pain Yes, see Vital Sign flowsheet  (reports minimal pain in low back)   Integumentary/Edema   Integumentary/Edema no deficits were identifed   Integumentary/Edema Comments PT: post-op incision   Posture    Posture Not impaired   Range of Motion (ROM)   ROM Comment PT: WFL BLEs   Strength   Strength Comments PT: WFL BLEs   Bed Mobility   Bed Mobility Comments PT: IND HOB lowered log roll technique   Transfer Skills   Transfer Comments PT: IND   Gait   Gait Comments PT: ' x1   Balance   Balance Comments PT: good standing balance   Sensory Examination   Sensory Perception Comments PT: NT, reports no issues   Clinical Impression   Criteria for Skilled Therapeutic Intervention yes, treatment indicated   PT Diagnosis PT: impaired functional mobility   Influenced by the following impairments PT: post-op pain and strength deficits   Functional limitations due to impairments PT: impaired gait, transfers   Clinical Presentation Stable/Uncomplicated   Clinical Presentation Rationale PT: pt mobilizing well, good understanding of precautions   Clinical Decision Making (Complexity) Low complexity   Therapy Frequency` daily  (1 time eval and treat)   Predicted Duration of Therapy Intervention (days/wks) 1   Anticipated Discharge Disposition Home with Assist   Risk & Benefits of therapy have been explained Yes   Patient, Family & other staff in agreement with plan of care Yes   [facility] AM-PAC TM \"6 Clicks\"   2016, Trustees of [place] " "[place], under license to [employer].  All rights reserved.   6 Clicks Short Forms Basic Mobility Inpatient Short Form   [facility] AM-PAC  \"6 Clicks\" V.2 Basic Mobility Inpatient Short Form   1. Turning from your back to your side while in a flat bed without using bedrails? 4 - None   2. Moving from lying on your back to sitting on the side of a flat bed without using bedrails? 4 - None   3. Moving to and from a bed to a chair (including a wheelchair)? 4 - None   4. Standing up from a chair using your arms (e.g., wheelchair, or bedside chair)? 4 - None   5. To walk in hospital room? 4 - None   6. Climbing 3-5 steps with a railing? 4 - None   Basic Mobility Raw Score (Score out of 24.Lower scores equate to lower levels of function) 24   Total Evaluation Time   Total Evaluation Time (Minutes) 10     "

## 2018-11-06 NOTE — PROGRESS NOTES
"Orthopaedic Surgery Progress Note     Subjective: No acute events overnight. Pain better controlled than on post op check. No IV required overnight. Thigh pain improved. Tolerating clears, advancing to full liquids this am. Voiding spontaneously. +Flatus, no BM. Denies fever or chills, CP, SOB, numbness or tingling, motor dysfunction or weakness.     Objective: /71  Pulse 79  Temp 98.6  F (37  C) (Oral)  Resp 16  Ht 1.727 m (5' 8\")  Wt 82.8 kg (182 lb 8.7 oz)  SpO2 97%  BMI 27.76 kg/m2    General: NAD, alert and oriented, cooperative with exam. Sitting up at the edge of the bed  Cardio: RRR, extremities wwp.   Respiratory: Non-labored breathing.  MSK: Dressing with drainage on the left dressing unchanged from last night. DP 2+. SILT L3-S1 bilaterally.   L2-3: Hip flexion L and R 5/5 strength    L4:  Knee extension L and R 5/5 strength   L5:  Foot / EHL dorsiflexion L and R 5/5 strength   S1:  Plantarflexion  L and R 5/5 strength    Labs: pending    Imaging: pending     Assessment and Plan: Lee Mckenzie is a 37 year old male s/p ALIF L5-S1 and minimally invasive PISF L5-S1 with decompression on the right on 11/5.       Ortho Primary  Activity: Up with assist until independent. No excessive bending or twisting. No lifting >10 lbs x 6 weeks. No Rei lift for transfers.   Weight bearing status: WBAT.  Pain management: Transition from IV to PO as tolerated.  IV toradol scheduled. PRN Robaxin  Antibiotics: Ancef x 24 hours.  Diet: clears. Advance to regular diet when return of flatus.    DVT prophylaxis: SCDs only. No chemical DVT ppx needed.   Imaging: XR Upright Lumbar PTDC - ordered.  Labs: Hgb POD#1-2.  Bracing/Splinting: None.  Dressings: Dressing change on POD#3, then ok to shower.  Drains: None   Wong catheter: Removed POD#1.   Physical Therapy/Occupational Therapy: Eval and treat.  Consults: Hospitalist.  Follow-up: Clinic with Dr. Nixon in 6 weeks with x-rays   Disposition: Pending progress " with therapies, pain control on orals, and medical stability, anticipate discharge to home today vs tomorrow. Needs XR prior to discharge.       Davin Greenwood MD  Orthopedic Surgery, PGY-4  Pager: [phone]     For questions about this patient during weekday business hours, please attempt to contact me at my pager prior to contacting the Orthopaedic Surgery resident on call. On the weekends and overnight, please page the Orthopaedic Surgery resident on call. Thank you!

## 2018-11-06 NOTE — PROGRESS NOTES
"Brief Progress Note    Chart Reviewed.  Sitting at side of bed, requesting more food.  Tolerating clears without n/v.  +flatus.  Palpable DPs bilaterally.    Vital signs:  Temp: 98.6  F (37  C) Temp src: Oral BP: 111/71 Pulse: 79 Heart Rate: 72 Resp: 16 SpO2: 97 % O2 Device: None (Room air) Oxygen Delivery: 2 LPM Height: 172.7 cm (5' 8\") Weight: 82.8 kg (182 lb 8.7 oz)  Estimated body mass index is 27.76 kg/(m^2) as calculated from the following:    Height as of this encounter: 1.727 m (5' 8\").    Weight as of this encounter: 82.8 kg (182 lb 8.7 oz).    Constitutional:  NAD  HEENT: normocephalic  CV: RRR  Pulm: CTAB, nonlabored respirations  GI: soft, NT/ND, midline incision c/d/i, dressing in place  MSK: warm, dry, pink,2+ DPs bilaterally  Neuro: A&O, motor/sensory grossly intact  Psych: Appropriate    38 yo M s/p ALIF L5-S1 and PSIF L5-S1 on 11/5    -progressing well postop  -recommend slowly advancing diet as tolerated  -educated on wound care of abdominal incision  -activity per Ortho  -overall care per primary  "

## 2018-11-06 NOTE — DISCHARGE SUMMARY
ORTHOPEDIC SURGERY DISCHARGE SUMMARY    Lee Mckenzie [id number] 11/5/2018  5:26 AM  37 year old male  11/6/2018    Date of Admission: 11/5/2018  Date of Discharge: 11/6/18  Disposition: Home  Primary care physician: Cassius Josue  Orthopaedic physician provider(s): Dr. Tiffani MD, Orthopedic Surgery    ADMISSION DIAGNOSIS:  1.  Degenerative disc disease L5-S1, with chronic discogenic low back pain.  2.  Right posterolateral disc herniation L5-S1, with Right S1 radiculopathy.  3.  Chronic smoking (1 ppd x 25-30 yrs).  4.  Chronic opioid dependence (currently hydrocodone 5/325, ~ 5 tabs/day).    HPI:  37 year old male with chronic low back pain, history of injury 11 years ago (cutting trees, hauling logs; fest snap in low back followed by LBP).  Imaging revealed advanced disc degeneration and right sided disc herniation L5-S1.  Tried nonoperative treatment, continues to have significant disabling symptoms.  I thus offered surgery in form of single-level fusion (ALIF), pedicle screw fixation and diskectomy.  Consented after thorough discussion of the rationale, risks, benefits and alternatives.  Also consented to on-label use of Infuse rhBMP-2.    DISCHARGE DIAGNOSIS:  1.  Degenerative disc disease L5-S1, with chronic discogenic low back pain.  2.  Right posterolateral disc herniation L5-S1, with Right S1 radiculopathy.  3.  Chronic smoking (1 ppd x 25-30 yrs).  4.  Chronic opioid dependence (currently hydrocodone 5/325, ~ 5 tabs/day).    SURGERY:  Part 1 - anterior procedure:  1.  Anterior lumbar interbody fusion (ALIF) L5-S1, using Infuse rhBMP-2 and crushed cancellous allograft.  2.  Placement of interbody titanium cage L5-S1.  3.  Buttress fixation of cage into L5 vertebral body using integrated screw thru cage.     Part 2 - posterior procedure:  1.  Bilateral percutaneous pedicle screw fixation L5-S1.  2.  MIS right hemilaminectomy, medial facetectomy, foraminotomy and diskectomy L5-S1.  3.  Computer  navigation using O-arm and Stealth.  4.  Use of operating microscope.    HOSPITAL COURSE:  Surgery was uncomplicated. The patient has done well post-operatively. Medicine consult was requested for co-management. he has received routine nursing cares and is medically stable. Vital signs are stable. he is tolerating a regular diet without GI distress/nausea or vomiting; and voiding spontaneously. he has met PT/OT goals for safe mobility. Pain is controlled on oral medications which will be available at home. he will be given stool softeners to use while taking pain medications to help prevent constipation. he has a safe discharge plan to home.     DVT prophylaxis:  No chemical ppx  Antibiotics:  Ancef given periop and 24 hours postop.  Activity:   Weight bearing as tolerated. No excessive or repetitive bending or twisting. No lifting >10 lbs x 6 weeks.   Follow up:   Follow up with Dr. Nixon in 6 weeks with repeat xrays.      Other discharge orders and instructions as below.    ORTHOPEDIC EXAM:  Vitals:    B/P: 99/71, T: 98.6, P: 77, R: 16  Physical Exam:  Please see the progress note from the day of discharge.   Pertinent exam findings followed during admission: none    LABS:    Recent Labs  Lab 11/06/18  0709 11/05/18  0611   HGB 13.7 13.9       Recent Labs  Lab 11/06/18  0709 11/05/18  0611     --    POTASSIUM 3.8  --    CHLORIDE 104  --    CO2 30  --    BUN 14  --    CR 0.89  --    * 104*     No lab results found in last 7 days.    ALLERGIES:   Not on File    PENDING TESTS RESULTS:  none    SUB-SPECIALTY CONSULTANT RECOMMENDATIONS:  Medicine was consulted for medical co-management. Assessment and plan are as follows:  L5-S1 Anterior Lumbar Interbody Fusion for L5S1 degenerative disc disease: POD 1  Management by primary team. Please see their notes for further details  Stop  IV  fluids   Pain control with acetaminophen 975 mg every 8 hrs for 3 days, Oxycodone 10-15 mg every 3 hrs PRN and 0.5-0.7 IV  hydromorphone q 3 hrs for breakthrough pain   DVT prophylaxis with  SCDs while in hospital, 81 mg aspirin BID x4 weeks total.  PT/OT as per protocol   Incentive spirometry and aggressive bowel regimen  We will monitor for acute blood loss anemia. Post op Hgb at 13.7           H/O Tobacco use and marijuana use:   Recommend cessation.         PLANNED DISCHARGE ORDERS, RECOMMENDATIONS, AND FOLLOWUP:    Discharge Procedure Orders  Reason for your hospital stay   Order Comments: Spine surgery     Adult [facility] Follow-up and recommended labs and tests   Order Comments: Follow up at 6 weeks with Dr. Nixon and repeat xrays    Appointments on [place] and/or [facility] (with [facility] or [facility] provider or service). Call [phone] if you haven't heard regarding these appointments within 7 days of discharge.     Wound care and dressings   Order Comments: -Wound Care: Your incision was closed with sutures underneath the skin. Remove the dressings 3 days after surgery, then ok to shower. You may replace this with gauze and tape.  Change the bandages every other day and keep wound clean and dry. Steri-strips, glue, or clear strands of suture at the end of the incision are normal. Which ever is present, they should be left on until they fall off or are removed at your first office visit. Water may run over incision but no scrubbing. Do not submerge wound in water (pool, spa, sauna, lake, bathtub, etc.) for at least 4 weeks.     Activity   Order Comments: Your activity upon discharge: Weight bearing as tolerated. No excessive or repetitive bending or twisting. No lifting >10 lbs x 6 weeks.   Order Specific Question Answer Comments   Is discharge order? Yes      Discharge Instructions   Order Comments: -Pain control: Take medication as prescribed, but only as often as necessary.  Narcotic pain medications have numerous side effects which can include nausea, constipation, drowsiness, itching, confusion, and respiratory issues  if you take too much. If you experience nausea, this may be relieved by taking the medication with food. To avoid constipation, you should take a stool softener. Do not drive or drink alcohol while you are taking pain medication. Remember that Tylenol can be used for pain relief as you wean off the narcotics. Do not exceed 4,000 mg of tylenol in 24 hours. Please ice the affected area as much as possible to reduce swelling. Swelling is one of the most common causes of pain after surgery. You may not take anti-inflammatory medications (i.e., Advil, Ibuprofen, Aleve) for the first 3 months after surgery as it interferes with bone healing.     -Activity: No excessive or repetitive bending, twisting or lifting. No lifting >10 lbs x 6 weeks. This means no sporting activities (such as running, tennis, bowling, golf, bicycling, etc.) until you are seen in clinic. We encourage you to be up and out of bed regularly. Please try to walk 30 minutes per day. If you have been prescribed a brace, please wear the brace when up and out of bed.    -Call our clinic at [phone] during regular office hours, or [phone] for the orthopedic resident on call after hours for questions - ON-CALL RESIDENTS DO NOT HAVE ABILITY TO PRESCRIBE NARCOTICS AFTER HOURS OR ON WEEKENDS. If narcotic pain medication prescriptions are required during these times, you will have to go to an Urgent Care or Emergency Department, or wait until clinic opens.    -Reasons to call clinic: Pain in your surgical area persists or worsens in the first few days after discharge; excessive redness or drainage of cloudy or bloody material from the wounds (some light drainage is often normal in the first week after surgery); drainage of any kind greater than one week after surgery; temperature elevation greater than 101.0 ; You have pain, swelling or redness in your calf that does not improve with elevation; new numbness or weakness in your leg or foot which does not  improve with repositioning.  Call your primary doctor or seek medical care if you have chest pain or shortness of breath.     Diet   Order Comments: Follow this diet upon discharge: regular   Order Specific Question Answer Comments   Is discharge order? Yes      Assign Questionnaire Series to Patient          Review of your medicines      START taking       Dose / Directions    acetaminophen 325 MG tablet   Commonly known as:  TYLENOL   Used for:  Degeneration of lumbar or lumbosacral intervertebral disc        Dose:  975 mg   Take 3 tablets (975 mg) by mouth every 8 hours   Quantity:  100 tablet   Refills:  1       docusate sodium 100 MG capsule   Commonly known as:  COLACE   Used for:  Degeneration of lumbar or lumbosacral intervertebral disc        Dose:  100 mg   Take 1 capsule (100 mg) by mouth 2 times daily as needed for constipation (no stool in 24 hrs)   Quantity:  60 capsule   Refills:  1       hydrOXYzine 25 MG tablet   Commonly known as:  ATARAX   Used for:  Degeneration of lumbar or lumbosacral intervertebral disc        Dose:  25-50 mg   Take 1-2 tablets (25-50 mg) by mouth every 6 hours as needed for itching   Quantity:  60 tablet   Refills:  1       methocarbamol 750 MG tablet   Commonly known as:  ROBAXIN   Used for:  Degeneration of lumbar or lumbosacral intervertebral disc        Dose:  750 mg   Take 1 tablet (750 mg) by mouth every 6 hours as needed for muscle spasms   Quantity:  80 tablet   Refills:  1       oxyCODONE IR 10 MG tablet   Commonly known as:  ROXICODONE   Used for:  Degeneration of lumbar or lumbosacral intervertebral disc        Dose:  10-15 mg   Take 1-1.5 tablets (10-15 mg) by mouth every 3 hours as needed for moderate to severe pain   Quantity:  100 tablet   Refills:  0         CONTINUE these medicines which have NOT CHANGED       Dose / Directions    ALEVE PO        Dose:  220-440 mg   Take 220-440 mg by mouth as needed   Refills:  0       diclofenac 1 % Gel topical gel    Commonly known as:  VOLTAREN        Place onto the skin as needed   Refills:  0       HYDROcodone-acetaminophen 5-325 MG per tablet   Commonly known as:  NORCO        Dose:  1-2 tablet   Take 1-2 tablets by mouth as needed for severe pain   Refills:  0            Where to get your medicines      These medications were sent to [facility] [place], [place] - [address]  [address] [place] [address], [facility] [address]     Phone:  [phone]      acetaminophen 325 MG tablet     docusate sodium 100 MG capsule     hydrOXYzine 25 MG tablet     methocarbamol 750 MG tablet         Some of these will need a paper prescription and others can be bought over the counter. Ask your nurse if you have questions.     Bring a paper prescription for each of these medications      oxyCODONE IR 10 MG tablet               Davin Greenwood MD  Orthopedic Surgery, PGY-4  Pager: [phone]

## 2018-11-06 NOTE — PLAN OF CARE
Problem: Patient Care Overview  Goal: Plan of Care/Patient Progress Review  Pt. discharged at 1330 via automobile to home.  Pt. was accompanied by spouse, Ejssie, and left with personal belongings.  Prior to discharge, PIV was removed.  Pt. received complete discharge paperwork and new medications as filled by discharge pharmacy.  Pt. was given times of last dose for all discharge medications in writing on discharge medication sheets.  Discharge teaching included new medication, pain management, activity restrictions, dressing changes, and signs and symptoms of infection.  Pt. to follow up with Dr. Nixon in clinic on December 18, 2018.  Pt. had no further questions at the time of discharge and no unmet needs were identified.

## 2018-11-06 NOTE — PROGRESS NOTES
"[facility], [place]   Internal Medicine Daily Note          Assessment and Plan:    Lee Mckenzie is a 37 year old male who was admitted on 11/5/2018 after elective L5-S1 Anterior Lumbar Interbody Fusion for L5S1 degenerative disc disease.     L5-S1 Anterior Lumbar Interbody Fusion for L5S1 degenerative disc disease: POD 1  Management by primary team. Please see their notes for further details  Stop  IV  fluids   Pain control with acetaminophen 975 mg every 8 hrs for 3 days, Oxycodone 10-15 mg every 3 hrs PRN and 0.5-0.7 IV hydromorphone q 3 hrs for breakthrough pain   DVT prophylaxis with  SCDs while in hospital, 81 mg aspirin BID x4 weeks total.  PT/OT as per protocol   Incentive spirometry and aggressive bowel regimen  We will monitor for acute blood loss anemia. Post op Hgb at 13.7        H/O Tobacco use and marijuana use:   Recommend cessation.           Consulting teams: Internal Medicine   Code status:Full   DVT Prophylaxis: PCD's   Gastric prophylaxis: Not required   Diet:Regular adult   Disposition: To be determined       Patient seen and examined with RN         Interval History:   Progress notes in the last 24 hrs by MDT team has been reviewed.   This is the first time I am meeting with patient. His H&P and progress of events has been reviewed.   Feels well this morning  This thigh pains have largely resolved  Eating and drinking well  Denies chest pain/ SOB            Review of Systems:   A comprehensive review of systems was performed and found to be negative except: Those that are outlined in interval history              Medications:   I have reviewed this patient's current medications which are outlined in the \"current medication\" section of EPIC             Physical Exam:   Vitals were reviewed  Temp: 98.6  F (37  C) Temp src: Oral BP: 112/85 Pulse: 93 Heart Rate: 72 Resp: 16 SpO2: 97 % O2 Device: None (Room air) Oxygen Delivery: 2 LPM  Constitutional:   awake, alert, " cooperative, no apparent distress, and appears stated age     Lungs:   No increased work of breathing, good air exchange, clear to auscultation bilaterally, no crackles or wheezing     Cardiovascular:   Normal apical impulse, regular rate and rhythm, normal S1 and S2, no S3 or S4, and no murmur noted     Abdomen:   No scars, normal bowel sounds, soft, non-distended, non-tender, no masses palpated, no hepatosplenomegally     Musculoskeletal:   no lower extremity pitting edema present  there is no redness, warmth, or swelling of the joints  motor strength is 5 out of 5 all extremities bilaterally     Neurologic:   Awake, alert, oriented to name, place and time.  Cranial nerves II-XII are grossly intact.             Data:     Most recent labs have been evaluated and relevant labs are outlined below :    BMP  Recent Labs  Lab 11/06/18  0709 11/05/18  0611     --    POTASSIUM 3.8  --    CHLORIDE 104  --    NATE 8.8  --    CO2 30  --    BUN 14  --    CR 0.89  --    * 104*     CBC  Recent Labs  Lab 11/06/18  0709   HGB 13.7     INRNo lab results found in last 7 days.  LFTsNo lab results found in last 7 days.   PANCNo lab results found in last 7 days.          Case d/w primary team     Dr BRANDIE Stern MD  Hospitalist ( Internal medicine)  Pager: [phone]

## 2018-11-06 NOTE — PLAN OF CARE
Problem: Patient Care Overview  Goal: Plan of Care/Patient Progress Review  Discharge Planner PT   Patient plan for discharge: home w/ assist PRN  Current status: pt mobilizing IND, no assistive device. VSS, pt ambulating ~350' x1 w/ no balance concerns and good activity tolerance. Pt has slight limp but does not affect stability. Pt educated on precautions post spine surgery, good adherence throughout session including log roll. Pt screened for OT needs; does not have them. Conversation had on adaptions with ADLs w/ spine precautions, pt understanding.   Barriers to return to prior living situation: medical  Recommendations for discharge: home w/ assist PRN  Rationale for recommendations: No concerns about safe home discharge from mobility perspective w/ assist as needed from wife. Will sign off. No DME recommendations.        Entered by: Meg Gu 11/06/2018 10:44 AM       Physical Therapy Discharge Summary    Reason for therapy discharge:    All goals and outcomes met, no further needs identified.    Progress towards therapy goal(s). See goals on Care Plan in [facility] electronic health record for goal details.  Goals met    Therapy recommendation(s):    Pt is safe to discharge home with assist of wife as needed. No further therapy recommended however pt encouraged to stay active within tolerance and safety. Pt provided handout on proper body mechanics with overview of precautions. No DME recommendations, pt is mobilizing IND w/ no assistive device.

## 2018-11-07 NOTE — PROGRESS NOTES
[facility]: Post-Discharge Note  SITUATION                                                      Admission:    Admission Date: 11/05/18   Reason for Admission: Degenerative disc disease L5-S1, with chronic discogenic low back pain.  Discharge:   Discharge Date: 11/06/18  Discharge Diagnosis: Degenerative disc disease L5-S1, with chronic discogenic low back pain.  Discharge Service: Orthopedics     BACKGROUND                                                      37 year old male with chronic low back pain, history of injury 11 years ago (cutting trees, hauling logs; fest snap in low back followed by LBP).  Imaging revealed advanced disc degeneration and right sided disc herniation L5-S1.  Tried nonoperative treatment, continues to have significant disabling symptoms.  I thus offered surgery in form of single-level fusion (ALIF), pedicle screw fixation and diskectomy.  Consented after thorough discussion of the rationale, risks, benefits and alternatives.  Also consented to on-label use of Infuse rhBMP-2.    ASSESSMENT      Discharge Assessment  Patient reports symptoms are: Unchanged  Does the patient have all of their medications?: Yes  Does patient know what their new medications are for?: Yes  Does patient have a follow-up appointment scheduled?: Yes  Does patient have any other questions or concerns?: No    Post-op  Did the patient have surgery or a procedure: Yes    PLAN                                                      Outpatient Plan:  Follow up at 6 weeks with Dr. Nixon and repeat xrays    Future Appointments  Date Time Provider Department Center   12/18/2018 8:45 AM Charlie Nixon MD [facility]   1/29/2019 8:45 AM Charlie Nixon MD [facility]           Kassy Silva, [facility]

## 2018-11-07 NOTE — PLAN OF CARE
Post op xrays complete and reviewed by Dr Nixon. Pt has met PT/OT goal okay to discharge per Dr Nixon.

## 2018-11-09 LAB
BLD PROD TYP BPU: NORMAL
BLD UNIT ID BPU: 0
BLOOD PRODUCT CODE: NORMAL
BPU ID: NORMAL
TRANSFUSION STATUS PATIENT QL: NORMAL
TRANSFUSION STATUS PATIENT QL: NORMAL

## 2018-11-13 ENCOUNTER — TELEPHONE (OUTPATIENT)
Dept: ORTHOPEDICS | Facility: CLINIC | Age: 38
End: 2018-11-13

## 2018-11-13 DIAGNOSIS — M51.379 DEGENERATION OF LUMBAR OR LUMBOSACRAL INTERVERTEBRAL DISC: ICD-10-CM

## 2018-11-13 RX ORDER — OXYCODONE HYDROCHLORIDE 10 MG/1
10-15 TABLET ORAL
Qty: 80 TABLET | Refills: 0 | Status: SHIPPED | OUTPATIENT
Start: 2018-11-13 | End: 2018-11-23

## 2018-11-13 NOTE — TELEPHONE ENCOUNTER
Health Call Center    Phone Message    May a detailed message be left on voicemail: yes    Reason for Call: Medication Refill Request    Has the patient contacted the pharmacy for the refill? Yes   Name of medication being requested: oxyCODONE IR (ROXICODONE) 10 MG tablet  Provider who prescribed the medication: Charlie Nixon  Pharmacy: [id number] - [place], [place] - [address]    Date medication is needed: ASAP  Pt has 15 tabs left         Action Taken: Message routed to:  [facility] ([facility] Ortho clinic

## 2018-11-13 NOTE — TELEPHONE ENCOUNTER
Patient contacted.  All of his questions were answered.  He feels his leg symptoms have improved since surgery.  He is taking Tylenol every 8 hours.  He has had no fever noted.

## 2018-11-13 NOTE — TELEPHONE ENCOUNTER
[facility]    Phone Message    May a detailed message be left on voicemail: yes    Reason for Call: pt calling for a questions for the nurse  Lack of sleep- will it make the pain worse? Pain in R leg in to the toes. Is this due to the way he walked before- walked with a limp for 10.5 years-- sweating when sleeping. woke up with a drenched shirt after he got home from the hospital. Is this due to the medications?  Also has a question on the pain medication. He has some questions regarding this as well  Action Taken: Message routed to:  [facility] orthopedics

## 2018-11-16 ENCOUNTER — TELEPHONE (OUTPATIENT)
Dept: CALL CENTER | Age: 38
End: 2018-11-16

## 2018-11-16 ENCOUNTER — TRANSFERRED RECORDS (OUTPATIENT)
Dept: HEALTH INFORMATION MANAGEMENT | Facility: CLINIC | Age: 38
End: 2018-11-16

## 2018-11-16 ENCOUNTER — HOSPITAL ENCOUNTER (EMERGENCY)
Facility: CLINIC | Age: 38
Discharge: HOME | End: 2018-11-16
Attending: EMERGENCY MEDICINE | Admitting: EMERGENCY MEDICINE
Payer: COMMERCIAL

## 2018-11-16 VITALS
BODY MASS INDEX: 27.22 KG/M2 | SYSTOLIC BLOOD PRESSURE: 120 MMHG | RESPIRATION RATE: 16 BRPM | DIASTOLIC BLOOD PRESSURE: 75 MMHG | WEIGHT: 179 LBS | OXYGEN SATURATION: 99 % | HEART RATE: 73 BPM | TEMPERATURE: 96.7 F

## 2018-11-16 DIAGNOSIS — S30.1XXA ABDOMINAL WALL SEROMA, INITIAL ENCOUNTER: ICD-10-CM

## 2018-11-16 LAB
BASOPHILS # BLD AUTO: 0.1 10E9/L (ref 0–0.2)
BASOPHILS NFR BLD AUTO: 0.4 %
CRP SERPL-MCNC: 5 MG/L (ref 0–8)
DIFFERENTIAL METHOD BLD: ABNORMAL
EOSINOPHIL # BLD AUTO: 0.5 10E9/L (ref 0–0.7)
EOSINOPHIL NFR BLD AUTO: 3.4 %
ERYTHROCYTE [DISTWIDTH] IN BLOOD BY AUTOMATED COUNT: 12.7 % (ref 10–15)
HCT VFR BLD AUTO: 42 % (ref 40–53)
HGB BLD-MCNC: 14.1 G/DL (ref 13.3–17.7)
IMM GRANULOCYTES # BLD: 0 10E9/L (ref 0–0.4)
IMM GRANULOCYTES NFR BLD: 0.2 %
LYMPHOCYTES # BLD AUTO: 3.4 10E9/L (ref 0.8–5.3)
LYMPHOCYTES NFR BLD AUTO: 25.9 %
MCH RBC QN AUTO: 30.1 PG (ref 26.5–33)
MCHC RBC AUTO-ENTMCNC: 33.6 G/DL (ref 31.5–36.5)
MCV RBC AUTO: 90 FL (ref 78–100)
MONOCYTES # BLD AUTO: 0.7 10E9/L (ref 0–1.3)
MONOCYTES NFR BLD AUTO: 5.5 %
NEUTROPHILS # BLD AUTO: 8.5 10E9/L (ref 1.6–8.3)
NEUTROPHILS NFR BLD AUTO: 64.6 %
NRBC # BLD AUTO: 0 10*3/UL
NRBC BLD AUTO-RTO: 0 /100
PLATELET # BLD AUTO: 439 10E9/L (ref 150–450)
RBC # BLD AUTO: 4.68 10E12/L (ref 4.4–5.9)
WBC # BLD AUTO: 13.2 10E9/L (ref 4–11)

## 2018-11-16 PROCEDURE — 99284 EMERGENCY DEPT VISIT MOD MDM: CPT | Mod: Z6 | Performed by: EMERGENCY MEDICINE

## 2018-11-16 PROCEDURE — 99283 EMERGENCY DEPT VISIT LOW MDM: CPT | Performed by: EMERGENCY MEDICINE

## 2018-11-16 PROCEDURE — 85025 COMPLETE CBC W/AUTO DIFF WBC: CPT | Performed by: EMERGENCY MEDICINE

## 2018-11-16 PROCEDURE — 85652 RBC SED RATE AUTOMATED: CPT | Performed by: EMERGENCY MEDICINE

## 2018-11-16 PROCEDURE — 86140 C-REACTIVE PROTEIN: CPT | Performed by: EMERGENCY MEDICINE

## 2018-11-16 ASSESSMENT — ENCOUNTER SYMPTOMS
FEVER: 0
WOUND: 1

## 2018-11-16 NOTE — TELEPHONE ENCOUNTER
[facility]: Nurse Triage Note  SITUATION/BACKGROUND                                                      Lee Mckenzie is a 37 year old male who is calling Dr. Nixon.  He is post (Supine) Anterior lumbar interbody fusion lumbar 5-Sacral 1; use of rh BMP-2 and (Prone) stealth guided pedicle screw fixation lumbar 5-sacral1: Right wilber laminectomy-dissection lumbar 5-sacral 1 on 11/5.    Description:  Lee is calling to report a hard lump 3 inch by 2 inch under his abdominal incision.    Onset/duration:  Has been there since surgery, but dramatically getting larger over the past 3-4 days.  Precip. factors:  Surgery 11/5.  Associated symptoms:  He denies fever.  Denies change in skin color or temperature over the lump.  Incision is a little pink, but has been so since surgery.  Improves/worsens symptoms:  Getting worse over time.  Pain scale (0-10)  Back surgical pain, very tender and  positional. =2/10 with sitting, =5-6/10 with movement.  Abdominal pain =0/10    Lives in [place], doesn't think he can get here today.  If needs assessment today would have to be seen locally and he can do that.    MEDICATIONS:   Taking medication(s) as prescribed? Yes  Taking over the counter medication(s?) Yes  Any barriers to taking medication(s) as prescribed?  No  Medication(s) improving/managing symptoms?  No    Allergies: Not on File    ASSESSMENT      Needs assessment.    RECOMMENDATION/PLAN                                                      RECOMMENDED DISPOSITION:  I called Ortho clinic and spoke with Ariana.  She states Jessie will call him back with advice.  Will comply with recommendation: Yes, also advised is drastically changes in a short amount of time should seek care emergently.    If further questions/concerns or if symptoms do not improve, worsen or new symptoms develop, call your PCP or [phone] to talk with the Resident on call, as soon as possible.    Guideline used: Post operative  "problems page 458  Telephone Triage Protocols for Nurses, Fifth Edition, Silvia Rosario, RN    See triage RN phone message.  I called pt.back.  He can not come here today so he plans to go get checked at home in [place] today.    I asked him to call us back with update after seen & to sign CRIS to have any imaging or labs or tests & notes sent to  Us.     He agreed.    Jamie Rodriges RN.     Pt. Called back & stated ER [facility] in [place] did US abd. & told him he has fluid collection under incision that needs draining & stated they do not do that & referred him to his surgeon.  Pt. Denies pain & states \"freaked out when they told him that needs draining\".   Informed  who ordered pt. Come to [place] [facility] for eval.   Pt. Agreed.   Notified ER staff & On Call Ortho Resident.  T.O.R.B./Jamie Rodriges RN.   "

## 2018-11-16 NOTE — ED AVS SNAPSHOT
[facility], Emergency Department    [address]    [place] [address]    Phone:  [phone]    Fax:  [phone]                                       Lee Mckenzie   MRN: [id number]    Department:  [facility], Emergency Department   Date of Visit:  11/16/2018           Patient Information     Date Of Birth          1980        Your diagnoses for this visit were:     Abdominal wall seroma, initial encounter (H)        You were seen by Rocio Martinez MD.        Discharge Instructions       Please follow up with Orthopedics as already scheduled.       Postsurgical Seroma    A seroma is a sterile collection of fluid under the skin, usually at the site of a surgical incision. Fluid builds up under the skin where tissue was removed. It may form soon after your surgery. Or it may form up to about 1 to 2 weeks after surgery. It may look like a swollen lump and feel tender or sore.  A small seroma is not dangerous. Depending on its size and symptoms, it may not need to be treated. The seroma may go away on its own within a few weeks or months. Your body slowly absorbs the fluid. No medicine will make it go away faster. But if you have a large seroma or if it is causing pain, your healthcare provider may drain it. This is done with a syringe and needle. Or the provider may put in a drain. Seromas can return and may need to be drained multiple times. Rarely, you may need a minor procedure to remove the seroma. Long-term problems from a seroma are rare.  Home care  You may be given medicines to relieve pain. These may include acetaminophen and ibuprofen. Take these as directed. Check the seroma daily for the signs of infection listed below.  Follow-up care  Follow up with your healthcare provider, or as advised.  When to seek medical advice  Call your healthcare provider right away if you have signs of infection:    Fever of 100.4 F (38 C) or higher, or as directed by your healthcare  provider    Seroma or skin around it feels warm    Pain in the seroma that gets worse    Redness or swelling that gets worse  Also call your provider right away if any of these occur:    Drainage from the seroma that is white or colored or very bloody. Clear or slightly bloody drainage is normal.    Wound opens up    Rapid heart rate    Shortness of breath  Date Last Reviewed: 1/1/2017 2000-2018 [employer]. [address] [place] [address], [place] [address]. All rights reserved. This information is not intended as a substitute for professional medical care. Always follow your healthcare professional's instructions.        Seek medical attention if spreading skin redness, increasing pain, fever.         Your next 10 appointments already scheduled     Dec 18, 2018  8:45 AM CST   (Arrive by 8:15 AM)   RETURN SPINE with Charlie Nixon MD   [facility] ([facility])    [address] [place] [id number] [phone]            Jan 29, 2019  8:45 AM CST   (Arrive by 8:15 AM)   RETURN SPINE with Charlie Nixon MD   [facility] ([facility])    [address] [place] [id number] [phone]              24 Hour Appointment Hotline       To make an appointment at any [facility], call [phone]). If you don't have a family doctor or clinic, we will help you find one. [facility] are conveniently located to serve the needs of you and your family.             Review of your medicines      Our records show that you are taking the medicines listed below. If these are incorrect, please call your family doctor or clinic.        Dose / Directions Last dose taken    acetaminophen 325 MG tablet   Commonly known as:  TYLENOL   Dose:  975 mg   Quantity:  100 tablet        Take 3 tablets (975 mg) by mouth every 8 hours   Refills:  1        ALEVE PO    Dose:  220-440 mg        Take 220-440 mg by mouth as needed   Refills:  0        diclofenac 1 % Gel topical gel   Commonly known as:  VOLTAREN        Place onto the skin as needed   Refills:  0        docusate sodium 100 MG capsule   Commonly known as:  COLACE   Dose:  100 mg   Quantity:  60 capsule        Take 1 capsule (100 mg) by mouth 2 times daily as needed for constipation (no stool in 24 hrs)   Refills:  1        HYDROcodone-acetaminophen 5-325 MG per tablet   Commonly known as:  NORCO   Dose:  1-2 tablet        Take 1-2 tablets by mouth as needed for severe pain   Refills:  0        hydrOXYzine 25 MG tablet   Commonly known as:  ATARAX   Dose:  25-50 mg   Quantity:  60 tablet        Take 1-2 tablets (25-50 mg) by mouth every 6 hours as needed for itching   Refills:  1        methocarbamol 750 MG tablet   Commonly known as:  ROBAXIN   Dose:  750 mg   Quantity:  80 tablet        Take 1 tablet (750 mg) by mouth every 6 hours as needed for muscle spasms   Refills:  1        oxyCODONE IR 10 MG tablet   Commonly known as:  ROXICODONE   Dose:  10-15 mg   Quantity:  80 tablet        Take 1-1.5 tablets (10-15 mg) by mouth every 3 hours as needed for moderate to severe pain   Refills:  0                Procedures and tests performed during your visit     CBC with platelets differential    CRP inflammation    Erythrocyte sedimentation rate auto      Orders Needing Specimen Collection     None      Pending Results     Date and Time Order Name Status Description    11/16/2018 2233 Erythrocyte sedimentation rate auto In process             Pending Culture Results     No orders found from 11/14/2018 to 11/17/2018.            Pending Results Instructions     If you had any lab results that were not finalized at the time of your Discharge, you can call the ED Lab Result RN at [phone]. You will be contacted by this team for any positive Lab results or changes in treatment. The nurses are available 7 days a week from  "10A to 6:30P.  You can leave a message 24 hours per day and they will return your call.        Thank you for choosing [facility]       Thank you for choosing [facility] for your care. Our goal is always to provide you with excellent care. Hearing back from our patients is one way we can continue to improve our services. Please take a few minutes to complete the written survey that you may receive in the mail after you visit with us. Thank you!        [employer] Information     [employer] lets you send messages to your doctor, view your test results, renew your prescriptions, schedule appointments and more. To sign up, go to [email or web address] . Click on \"Log in\" on the left side of the screen, which will take you to the Welcome page. Then click on \"Sign up Now\" on the right side of the page.     You will be asked to enter the access code listed below, as well as some personal information. Please follow the directions to create your username and password.     Your access code is: [email or web address]  Expires: 2019  6:43 AM     Your access code will  in 90 days. If you need help or a new code, please call your [facility] clinic or [phone].        Care EveryWhere ID     This is your Care EveryWhere ID. This could be used by other organizations to access your [facility] medical records  [id number]        Equal Access to Services     THUY CEJA : Hadii violet plascenciao Sogurinder, waaxda luqadaha, qaybta kaalmada kwesi, velasquez melara. So [facility] [phone].    ATENCIÓN: Si habla español, tiene a gilliam disposición servicios gratuitos de asistencia lingüística. Llame al [phone].    We comply with applicable federal civil rights laws and [place] laws. We do not discriminate on the basis of race, color, national origin, age, disability, sex, sexual orientation, or gender identity.            After Visit Summary       This is your record. Keep this with you and show to your community " pharmacist(s) and doctor(s) at your next visit.

## 2018-11-16 NOTE — ED AVS SNAPSHOT
[facility], Emergency Department    [address]    [facility] [address]    Phone:  [phone]    Fax:  [phone]                                       Lee Mckenzie   MRN: [id number]    Department:  [facility], Emergency Department   Date of Visit:  11/16/2018           After Visit Summary Signature Page     I have received my discharge instructions, and my questions have been answered. I have discussed any challenges I see with this plan with the nurse or doctor.    ..........................................................................................................................................  Patient/Patient Representative Signature      ..........................................................................................................................................  Patient Representative Print Name and Relationship to Patient    ..................................................               ................................................  Date                                   Time    ..........................................................................................................................................  Reviewed by Signature/Title    ...................................................              ..............................................  Date                                               Time          22EPIC Rev 08/18

## 2018-11-17 LAB — ERYTHROCYTE [SEDIMENTATION RATE] IN BLOOD BY WESTERGREN METHOD: 14 MM/H (ref 0–15)

## 2018-11-17 NOTE — DISCHARGE INSTRUCTIONS
Please follow up with Orthopedics as already scheduled.       Postsurgical Seroma    A seroma is a sterile collection of fluid under the skin, usually at the site of a surgical incision. Fluid builds up under the skin where tissue was removed. It may form soon after your surgery. Or it may form up to about 1 to 2 weeks after surgery. It may look like a swollen lump and feel tender or sore.  A small seroma is not dangerous. Depending on its size and symptoms, it may not need to be treated. The seroma may go away on its own within a few weeks or months. Your body slowly absorbs the fluid. No medicine will make it go away faster. But if you have a large seroma or if it is causing pain, your healthcare provider may drain it. This is done with a syringe and needle. Or the provider may put in a drain. Seromas can return and may need to be drained multiple times. Rarely, you may need a minor procedure to remove the seroma. Long-term problems from a seroma are rare.  Home care  You may be given medicines to relieve pain. These may include acetaminophen and ibuprofen. Take these as directed. Check the seroma daily for the signs of infection listed below.  Follow-up care  Follow up with your healthcare provider, or as advised.  When to seek medical advice  Call your healthcare provider right away if you have signs of infection:    Fever of 100.4 F (38 C) or higher, or as directed by your healthcare provider    Seroma or skin around it feels warm    Pain in the seroma that gets worse    Redness or swelling that gets worse  Also call your provider right away if any of these occur:    Drainage from the seroma that is white or colored or very bloody. Clear or slightly bloody drainage is normal.    Wound opens up    Rapid heart rate    Shortness of breath  Date Last Reviewed: 1/1/2017 2000-2018 [employer]. [address] [place] [address], [place] [address]. All rights reserved. This information is not intended as a  substitute for professional medical care. Always follow your healthcare professional's instructions.        Seek medical attention if spreading skin redness, increasing pain, fever.

## 2018-11-17 NOTE — ED PROVIDER NOTES
History     Chief Complaint   Patient presents with     Post-op Problem     Pt had surgery on 11/5. He has fluid build up along the incision site. He was seen this morning in the [place] ED. The surgeon referred him to the ED     HPI  Lee Mckenzie is a 37 year old male with a history of chronic low back pain with recent imaging revealing advanced disc degeneration and right-sided disc herniation at L5-S1 status post single level (ALIF), pedicle screw fixation and discectomy performed on 11/5/18 who presents to the Emergency Department today for evaluation of postop problem.  The patient presented to [facility] Emergency Department today complaining of a lump around his abdominal incision that presented a few days ago and has doubled in size.  The patient then had an abdominal ultrasound performed which revealed a hematoma versus seroma.  The patient was then referred to our ED for further evaluation and management.  Here, the patient denies having any pain, fever, or drainage from the swelling.  No f/c.  He is taking pain meds as prescribed.      I have reviewed the Medications, Allergies, Past Medical and Surgical History, and Social History in the [employer] system.    Past Medical History:   Diagnosis Date     Disc degeneration, lumbar      Osteoarthritis      Tobacco abuse      Past Surgical History:   Procedure Laterality Date     FUSION LUMBAR ANTERIOR ONE LEVEL N/A 11/5/2018    Procedure: Part 1:  (supine) Anterior Lumbar Interbody Fusion Lumbar 5-Sacral 1; Use Of rhBMP-2   ;  Surgeon: Charlie Nixon MD;  Location: [facility] OR     oral surgery       OTS FUSION SPINE POSTERIOR LUMBAR MINIMALLY INVASIVE ONE LEVEL N/A 11/5/2018    Procedure: Part 2:  (prone) Stealth Guided Pedicle Screw Fixation Lumbar 5 -Sacral 1; Right Hemilaminectomy-Discectomy Lumbar 5-Sacral 1;  Surgeon: Charlie Nixon MD;  Location: [facility] OR     No family history on file.    Social History   Substance Use Topics      Smoking status: Former Smoker     Smokeless tobacco: Never Used      Comment: Last cigerette 10/6/2018     Alcohol use No     No current facility-administered medications for this encounter.      Current Outpatient Prescriptions   Medication     acetaminophen (TYLENOL) 325 MG tablet     docusate sodium (COLACE) 100 MG capsule     hydrOXYzine (ATARAX) 25 MG tablet     methocarbamol (ROBAXIN) 750 MG tablet     oxyCODONE IR (ROXICODONE) 10 MG tablet     diclofenac (VOLTAREN) 1 % GEL topical gel     HYDROcodone-acetaminophen (NORCO) 5-325 MG per tablet     Naproxen Sodium (ALEVE PO)      No Known Allergies     Review of Systems   Constitutional: Negative for fever.   Skin: Positive for wound (swelling around abdominal postoperative incision sites).   All other systems reviewed and are negative.    Physical Exam   BP: 120/75  Pulse: 73  Temp: 96.7  F (35.9  C)  Resp: 16  Weight: 81.2 kg (179 lb)  SpO2: 99 %    Physical Exam   Constitutional: He appears well-developed and well-nourished. No distress.   HENT:   Head: Normocephalic.   Eyes: EOM are normal.   Cardiovascular: Normal rate.    Pulmonary/Chest: Effort normal.   Abdominal:       Skin: He is not diaphoretic.   Nursing note and vitals reviewed.      ED Course     ED Course     Procedures        Results for orders placed or performed during the hospital encounter of 11/16/18 (from the past 48 hour(s))   CBC with platelets differential   Result Value Ref Range    WBC 13.2 (H) 4.0 - 11.0 10e9/L    RBC Count 4.68 4.4 - 5.9 10e12/L    Hemoglobin 14.1 13.3 - 17.7 g/dL    Hematocrit 42.0 40.0 - 53.0 %    MCV 90 78 - 100 fl    MCH 30.1 26.5 - 33.0 pg    MCHC 33.6 31.5 - 36.5 g/dL    RDW 12.7 10.0 - 15.0 %    Platelet Count 439 150 - 450 10e9/L    Diff Method Automated Method     % Neutrophils 64.6 %    % Lymphocytes 25.9 %    % Monocytes 5.5 %    % Eosinophils 3.4 %    % Basophils 0.4 %    % Immature Granulocytes 0.2 %    Nucleated RBCs 0 0 /100    Absolute Neutrophil 8.5  (H) 1.6 - 8.3 10e9/L    Absolute Lymphocytes 3.4 0.8 - 5.3 10e9/L    Absolute Monocytes 0.7 0.0 - 1.3 10e9/L    Absolute Eosinophils 0.5 0.0 - 0.7 10e9/L    Absolute Basophils 0.1 0.0 - 0.2 10e9/L    Abs Immature Granulocytes 0.0 0 - 0.4 10e9/L    Absolute Nucleated RBC 0.0    CRP inflammation   Result Value Ref Range    CRP Inflammation 5.0 0.0 - 8.0 mg/L   Erythrocyte sedimentation rate auto   Result Value Ref Range    Sed Rate 14 0 - 15 mm/h              Assessments & Plan (with Medical Decision Making)   The patient had recent orthopedic surgery and has developed swelling at site of incision.  He was referred in for orthopedics to evaluate.  The ortho resident has asked for labs.  WBC slightly elevated but crp and sed rate are normal.  She does not feel that the site is infected and feels the patient has a seroma. She has advised against opening and would like to leave the site intact. He should follow up in their clinic as already scheduled.     I have reviewed the nursing notes.    I have reviewed the findings, diagnosis, plan and need for follow up with the patient.    Discharge Medication List as of 11/16/2018 11:25 PM          Final diagnoses:   Abdominal wall seroma, initial encounter (H)   Rob ESCOBAR, am serving as a trained medical scribe to document services personally performed by Rocio Martinez MD, based on the provider's statements to me.   Rocio ESOCBAR MD, was physically present and have reviewed and verified the accuracy of this note documented by Rob Roach.     11/16/2018   [facility], [place], EMERGENCY DEPARTMENT     Rocio Martinez MD  11/17/18 0053

## 2018-11-23 ENCOUNTER — TELEPHONE (OUTPATIENT)
Dept: ORTHOPEDICS | Facility: CLINIC | Age: 38
End: 2018-11-23

## 2018-11-23 DIAGNOSIS — M51.379 DEGENERATION OF LUMBAR OR LUMBOSACRAL INTERVERTEBRAL DISC: ICD-10-CM

## 2018-11-23 RX ORDER — OXYCODONE HYDROCHLORIDE 10 MG/1
10-15 TABLET ORAL EVERY 4 HOURS PRN
Qty: 40 TABLET | Refills: 0 | Status: SHIPPED | OUTPATIENT
Start: 2018-11-23

## 2018-11-23 NOTE — TELEPHONE ENCOUNTER
[facility] Call Center    Phone Message    May a detailed message be left on voicemail: no    Reason for Call: Medication Refill Request    Has the patient contacted the pharmacy for the refill? Yes   Name of medication being requested: Oxycodone 10 mg  Provider who prescribed the medication: Dr. Nixon  Pharmacy: [facility] in [facility].  Date medication is needed: 11/26/2018       Action Taken: Message routed to:  [facility] ([facility] Ortho

## 2018-11-23 NOTE — TELEPHONE ENCOUNTER
Called pt but did not answer. Detailed message declined so I did not leave message. Prescription will be put in the mail today and sent to the pt as it is oxycodone. Discussed with Whitney Brooke and Mateo Keene on prescription.    Surya POTTER, ATC

## 2018-12-05 DIAGNOSIS — M47.28 OSTEOARTHRITIS OF SPINE WITH RADICULOPATHY, SACRAL AND SACROCOCCYGEAL REGION: Primary | ICD-10-CM

## 2018-12-05 DIAGNOSIS — M43.20 FUSION OF SPINE, UNSPECIFIED SPINAL REGION: ICD-10-CM

## 2018-12-18 ENCOUNTER — ANCILLARY PROCEDURE (OUTPATIENT)
Dept: GENERAL RADIOLOGY | Facility: CLINIC | Age: 38
End: 2018-12-18
Attending: ORTHOPAEDIC SURGERY
Payer: COMMERCIAL

## 2018-12-18 ENCOUNTER — OFFICE VISIT (OUTPATIENT)
Dept: ORTHOPEDICS | Facility: CLINIC | Age: 38
End: 2018-12-18
Payer: COMMERCIAL

## 2018-12-18 VITALS — BODY MASS INDEX: 27.13 KG/M2 | WEIGHT: 179 LBS | HEIGHT: 68 IN

## 2018-12-18 DIAGNOSIS — M47.28 OSTEOARTHRITIS OF SPINE WITH RADICULOPATHY, SACRAL AND SACROCOCCYGEAL REGION: ICD-10-CM

## 2018-12-18 DIAGNOSIS — M43.20 FUSION OF SPINE, UNSPECIFIED SPINAL REGION: ICD-10-CM

## 2018-12-18 DIAGNOSIS — Z98.1 S/P SPINAL FUSION: Primary | ICD-10-CM

## 2018-12-18 ASSESSMENT — ENCOUNTER SYMPTOMS
WEIGHT LOSS: 1
ALTERED TEMPERATURE REGULATION: 1
DECREASED CONCENTRATION: 0
FEVER: 0
NERVOUS/ANXIOUS: 1
NIGHT SWEATS: 1
INSOMNIA: 1
PANIC: 0
POLYPHAGIA: 0
WEIGHT GAIN: 0
POLYDIPSIA: 1
DECREASED APPETITE: 1
FATIGUE: 1
INCREASED ENERGY: 0
HALLUCINATIONS: 0
CHILLS: 1
DEPRESSION: 0

## 2018-12-18 ASSESSMENT — MIFFLIN-ST. JEOR: SCORE: 1696.44

## 2018-12-18 NOTE — LETTER
Return to Work  2018     Seen today: yes    Patient:  Lee Mckenzie  :   1980  MRN:     [id number]  Physician: GENARO KHALIL    Lee Mckenzie may return to work on Date: 18.      The next clinic appointment is scheduled for (date/time) 6 wks (3 mos postop).    Patient limitations:    Patient now 6 wks after lumbar fusion.  May return to work light duty, part time (4 hrs/day).  If no light duty work available, would recommend waiting until after next appt (3 mos postop) before going back to work.            Electronically signed by Genaro Khalil MD

## 2018-12-18 NOTE — NURSING NOTE
"Reason For Visit:   Chief Complaint   Patient presents with     Surgical Followup     DOS 11/5/18 S/P Part 1 (supine): ALIF L5-S1; use of rhBMP-2 Part 2 (prone): Pedicle screw fixation L5-S1; Right hemilaminectomy-diskectomy L5-S1     Primary MD: No Ref-Primary, Physician        ?  No  Occupation   Currently working? No  Work status?  medical leave  Date of injury: None.  Date of surgery: 11/5/18  Type of surgery:  Part 1 (supine): ALIF L5-S1; use of rhBMP-2 Part 2 (prone): Pedicle screw fixation L5-S1; Right hemilaminectomy-diskectomy L5-S1  Smoker: Yes    Ht 1.727 m (5' 8\")   Wt 81.2 kg (179 lb)   BMI 27.22 kg/m      Pain Assessment  Patient Currently in Pain: Denies(Soreness. States its not pain. )    Oswestry (LAURYN) Questionnaire    OSWESTRY DISABILITY INDEX 12/18/2018   Count 8   Sum 16   Oswestry Score (%) 40          Visual Analog Pain Scale  Back Pain Scale 0-10: 4  Right leg pain: 0  Left leg pain: 0    Promis 10 Assessment    PROMIS 10 12/18/2018   In general, would you say your health is: Very good   In general, would you say your quality of life is: Good   In general, how would you rate your physical health? Good   In general, how would you rate your mental health, including your mood and your ability to think? Fair   In general, how would you rate your satisfaction with your social activities and relationships? Good   In general, please rate how well you carry out your usual social activities and roles Good   To what extent are you able to carry out your everyday physical activities such as walking, climbing stairs, carrying groceries, or moving a chair? Moderately   How often have you been bothered by emotional problems such as feeling anxious, depressed or irritable? Often   How would you rate your fatigue on average? Moderate   How would you rate your pain on average?   0 = No Pain  to  10 = Worst Imaginable Pain 4   In general, would you say your health is: 4   In general, would " you say your quality of life is: 3   In general, how would you rate your physical health? 3   In general, how would you rate your mental health, including your mood and your ability to think? 2   In general, how would you rate your satisfaction with your social activities and relationships? 3   In general, please rate how well you carry out your usual social activities and roles. (This includes activities at home, at work and in your community, and responsibilities as a parent, child, spouse, employee, friend, etc.) 3   To what extent are you able to carry out your everyday physical activities such as walking, climbing stairs, carrying groceries, or moving a chair? 3   In the past 7 days, how often have you been bothered by emotional problems such as feeling anxious, depressed, or irritable? 4   In the past 7 days, how would you rate your fatigue on average? 3   In the past 7 days, how would you rate your pain on average, where 0 means no pain, and 10 means worst imaginable pain? 4   Global Mental Health Score 10   Global Physical Health Score 12   PROMIS TOTAL - SUBSCORES 22                Mirna Vidales LPN

## 2018-12-18 NOTE — PROGRESS NOTES
"Reason for Visit:  Chief Complaint   Patient presents with     Surgical Followup     DOS 11/5/18 S/P Part 1 (supine): ALIF L5-S1; use of rhBMP-2 Part 2 (prone): Pedicle screw fixation L5-S1; Right hemilaminectomy-diskectomy L5-S1       S>  40/m, accompanied by daughter.  1st postop visit.    Preop sxs resolved; very satisfied.  Off opioids; just taking tylenol.    Still has not gone back to work ().       Oswestry (LAURYN) Questionnaire    OSWESTRY DISABILITY INDEX 12/18/2018   Count 8   Sum 16   Oswestry Score (%) 40      Preop LAURYN = 44%       Visual Analog Pain Scale  Back Pain Scale 0-10: 4  Right leg pain: 0  Left leg pain: 0    PROMIS-10 Scores  Global Mental Health Score: (P) 10  Global Physical Health Score: (P) 12  PROMIS TOTAL - SUBSCORES: (P) 22    O>   Alert, oriented x 3, cooperative.  Not in CP distress.  Ht 1.727 m (5' 8\")   Wt 81.2 kg (179 lb)   BMI 27.22 kg/m    Surgical incision well-healed, no sign of infection.  Ambulates independently.   Grossly neurologically intact.    Imaging:   EOS lumbar ap-lat shows ALIF-PPS construct L5-S1; no sign of fixation loosening/failure.    A>  6 wks postop, doing very well, with resolution of preop pain.    P>   Congratulated and reassured patient.  Discussed PT, recommended; he would like to think about it.  External PT order given.  May advance lifting restrictions to 25 lbs.  Letter printed; may RTW light duty, part time 50%.  If no light duty work available, recommend holding off on RTW until 3 mos postop.        Charlie Nixon MD    Orthopaedic Spine Surgery  Dept Orthopaedic Surgery, [facility]  [phone] office, [phone] pager  [email or web address]    "

## 2018-12-18 NOTE — LETTER
"12/18/2018       RE: Lee Mckenzie  [address] [facility] [address]     Dear Colleague,    Thank you for referring your patient, Lee Mckenzie, to the [facility] at [facility]. Please see a copy of my visit note below.    Reason for Visit:  Chief Complaint   Patient presents with     Surgical Followup     DOS 11/5/18 S/P Part 1 (supine): ALIF L5-S1; use of rhBMP-2 Part 2 (prone): Pedicle screw fixation L5-S1; Right hemilaminectomy-diskectomy L5-S1       S>  40/m, accompanied by daughter.  1st postop visit.    Preop sxs resolved; very satisfied.  Off opioids; just taking tylenol.    Still has not gone back to work ().       Oswestry (LAURYN) Questionnaire    OSWESTRY DISABILITY INDEX 12/18/2018   Count 8   Sum 16   Oswestry Score (%) 40      Preop LAURYN = 44%       Visual Analog Pain Scale  Back Pain Scale 0-10: 4  Right leg pain: 0  Left leg pain: 0    PROMIS-10 Scores  Global Mental Health Score: (P) 10  Global Physical Health Score: (P) 12  PROMIS TOTAL - SUBSCORES: (P) 22    O>   Alert, oriented x 3, cooperative.  Not in CP distress.  Ht 1.727 m (5' 8\")   Wt 81.2 kg (179 lb)   BMI 27.22 kg/m     Surgical incision well-healed, no sign of infection.  Ambulates independently.   Grossly neurologically intact.    Imaging:   EOS lumbar ap-lat shows ALIF-PPS construct L5-S1; no sign of fixation loosening/failure.    A>  6 wks postop, doing very well, with resolution of preop pain.    P>   Congratulated and reassured patient.  Discussed PT, recommended; he would like to think about it.  External PT order given.  May advance lifting restrictions to 25 lbs.  Letter printed; may RTW light duty, part time 50%.  If no light duty work available, recommend holding off on RTW until 3 mos postop.    Charlie Nixon MD    Orthopaedic Spine Surgery  Dept Orthopaedic Surgery, formerly [facility]  [phone] office, [phone] " pager  [email or web address]

## 2018-12-20 ENCOUNTER — TELEPHONE (OUTPATIENT)
Dept: ORTHOPEDICS | Facility: CLINIC | Age: 38
End: 2018-12-20

## 2018-12-20 NOTE — TELEPHONE ENCOUNTER
Health Call Center    Phone Message    May a detailed message be left on voicemail: no    Reason for Call: Other: Pt needs to get a copy of his work restrictions letters sent to his employer so he is paid his short term benefits while he is recovering. Pt's claim # is [id number], please fax to [employer] at [phone]. Please include copies of all his work restrictions, and he would like done asap due to being at risk of losing his job.     Action Taken: Message routed to:  [facility] ([facility]): [facility]

## 2019-01-28 DIAGNOSIS — M43.20 FUSION OF SPINE, UNSPECIFIED SPINAL REGION: Primary | ICD-10-CM

## 2019-01-29 ENCOUNTER — OFFICE VISIT (OUTPATIENT)
Dept: ORTHOPEDICS | Facility: CLINIC | Age: 39
End: 2019-01-29
Payer: COMMERCIAL

## 2019-01-29 ENCOUNTER — ANCILLARY PROCEDURE (OUTPATIENT)
Dept: GENERAL RADIOLOGY | Facility: CLINIC | Age: 39
End: 2019-01-29
Payer: COMMERCIAL

## 2019-01-29 VITALS — WEIGHT: 179 LBS | HEIGHT: 68 IN | BODY MASS INDEX: 27.13 KG/M2

## 2019-01-29 DIAGNOSIS — Z98.1 S/P SPINAL FUSION: Primary | ICD-10-CM

## 2019-01-29 DIAGNOSIS — M43.20 FUSION OF SPINE, UNSPECIFIED SPINAL REGION: ICD-10-CM

## 2019-01-29 ASSESSMENT — ENCOUNTER SYMPTOMS
JOINT SWELLING: 0
MUSCLE CRAMPS: 0
ARTHRALGIAS: 0
MYALGIAS: 1
NECK PAIN: 0
STIFFNESS: 0
BACK PAIN: 0
MUSCLE WEAKNESS: 0

## 2019-01-29 ASSESSMENT — MIFFLIN-ST. JEOR: SCORE: 1696.44

## 2019-01-29 NOTE — PROGRESS NOTES
REASON FOR VISIT: S/P L5-S1 spinal fusion    REFERRING PHYSICIAN: Charlie Nixon     PRIMARY CARE PHYSICIAN: Cassius Josue    HISTORY OF PRESENT ILLNESS: Lee Mckenzie is a 38 year old male who returns to clinic today for his second post operative appointment following L5-S1 spinal fusion, which was performed by Dr. Nixon on 11/5/18.  Overall, he reports that he is doing well and he feels about 60% better than he did before surgery.  He notes occasional low back pain, particularly over the left incision with bending.  It is not constant.  He is using a muscle relaxer as needed, as well as Aleve.  He is no longer taking any narcotics.  He has done any physical therapy and does not think he needs it.  He has questions about returning to work.     Oswestry score: 5% (previously 40%)  Kzuoeo14: 32 (previously 22)  Pain scale: 0 (previously 4)    PHYSICAL EXAM:   Constitutional - Patient is healthy, well-nourished and appears stated age.    BMI = 27.22    Respiratory - Patient is breathing normally and in no respiratory distress.   Skin - No suspicious rashes or lesions.  Surgical incisions are well healed.   Psychiatric - Normal mood and affect.   Cardiovascular - Extremities are warm and well perfused.   Eyes - Visual acuity is normal to the written word.   ENT - Hearing intact to the spoken word.   GI - No abdominal distention.   Musculoskeletal - Non-antalgic gait without use of assistive devices.          Lumbar Spine:    Appearance - Stands with head centered above the pelvis    Palpation - Non-tender to palpation in the midline and over the lateral incisions    ROM - Deferred    Motor -        LOWER EXTREMITY Left Right   Hip flexion 5/5 5/5   Knee flexion 5/5 5/5   Knee extension 5/5 5/5   Ankle dorsiflexion 5/5 5/5   Ankle plantarflexion 5/5 5/5   Great toe extension 5/5 5/5       Neurologic - Sensation intact to light touch bilaterally.        IMAGING: Radiographs of the lumbar spine were  obtained today.  They show instrumentation that is intact without evidence of loosening, fracture or migration. See full radiologic report in chart.    CLINICAL ASSESSMENT:   S/P L5-S1 spinal fusion    DISCUSSION/PLAN:   Lee is a 38 year old male who returned to clinic today for his second post operative appointment following lumbar spinal fusion, which was performed by Dr. Nixon on 11/5/18.  He is progressing as expected.  Radiographs were obtained today, and there was no evidence of instrumentation failure or adjacent segment degeneration.  At this point, he can return to work, but he should listen to his body and avoid activities that cause pain.  A work note was provided today.  He should return to clinic again in three months for repeat xrays (upright EOS AP and lateral of the lumbar spine) and further evaluation.    All questions and concerns were answered to the patient's apparent satisfaction before leaving the clinic.     Thank you for allowing Dr. Nixon and I to participate in the care of Lee Mckenzie.    Respectfully,  Caryn Sheikh PA-C    Attestation:  I (Dr. Charlie Nixon - Spine Surgeon) have personally evaluated patient with JEREMIAH Sheikh, and agree with findings and plan outlined in the note.  I discussed at length with the patient/family, explained the nature of spinal condition, and formulated workup and/or treatment plan together.  All questions were answered to the best of my ability and to patient's apparent satisfaction.      CC  Copy to patient    [address] [facility] [address]

## 2019-01-29 NOTE — LETTER
Return to Work  2019     Seen today: yes    Patient:  Lee Mckenzie  :   1980  MRN:     [id number]  Physician: GENARO KHALIL    Lee Mckenzie may return to work on Date: 19.      The next clinic appointment is scheduled for (date/time) 3 months (6 mos postop).    Patient limitations:    Now 3 mos s/p L5-S1 fusion.  May go back to work as .    Advised patient to take common-sense approach, listen to his body, and back off from certain activities if they produce discomfort or cause back pain.  Please allow 10 minute breaks as needed for back pain flare-up.             Electronically signed by Genaro Khalil MD

## 2019-01-29 NOTE — NURSING NOTE
"Reason For Visit:   Chief Complaint   Patient presents with     Surgical Followup     DOS 11/5/18 S/P Part 1 (supine): ALIF L5-S1; use of rhBMP-2 Part 2 (prone): Pedicle screw fixation L5-S1; Right hemilaminectomy-diskectomy L5-S1     Primary MD: No Ref-Primary, Physician        ?  No  Occupation   Currently working? No  Work status?  medical leave  Date of injury: None.  Date of surgery: 11/5/18  Type of surgery:  Part 1 (supine): ALIF L5-S1; use of rhBMP-2 Part 2 (prone): Pedicle screw fixation L5-S1; Right hemilaminectomy-diskectomy L5-S1  Smoker: Yes        Ht 1.727 m (5' 8\")   Wt 81.2 kg (179 lb)   BMI 27.22 kg/m      Pain Assessment  Patient Currently in Pain: Denies    Oswestry (LAURYN) Questionnaire    OSWESTRY DISABILITY INDEX 1/29/2019   Count 8   Sum 2   Oswestry Score (%) 5          Visual Analog Pain Scale  Back Pain Scale 0-10: 0  Right leg pain: 0  Left leg pain: 0    Promis 10 Assessment    PROMIS 10 1/29/2019   In general, would you say your health is: Very good   In general, would you say your quality of life is: Very good   In general, how would you rate your physical health? Very good   In general, how would you rate your mental health, including your mood and your ability to think? Very good   In general, how would you rate your satisfaction with your social activities and relationships? Very good   In general, please rate how well you carry out your usual social activities and roles Very good   To what extent are you able to carry out your everyday physical activities such as walking, climbing stairs, carrying groceries, or moving a chair? Completely   How often have you been bothered by emotional problems such as feeling anxious, depressed or irritable? Sometimes   How would you rate your fatigue on average? Mild   How would you rate your pain on average?   0 = No Pain  to  10 = Worst Imaginable Pain 1   In general, would you say your health is: 4   In general, would you say " your quality of life is: 4   In general, how would you rate your physical health? 4   In general, how would you rate your mental health, including your mood and your ability to think? 4   In general, how would you rate your satisfaction with your social activities and relationships? 4   In general, please rate how well you carry out your usual social activities and roles. (This includes activities at home, at work and in your community, and responsibilities as a parent, child, spouse, employee, friend, etc.) 4   To what extent are you able to carry out your everyday physical activities such as walking, climbing stairs, carrying groceries, or moving a chair? 5   In the past 7 days, how often have you been bothered by emotional problems such as feeling anxious, depressed, or irritable? 3   In the past 7 days, how would you rate your fatigue on average? 2   In the past 7 days, how would you rate your pain on average, where 0 means no pain, and 10 means worst imaginable pain? 1   Global Mental Health Score 15   Global Physical Health Score 17   PROMIS TOTAL - SUBSCORES 32                Mirna Vidales LPN

## 2019-01-29 NOTE — LETTER
1/29/2019       RE: Lee Mckenzie  [address] [facility] [address]     Dear Colleague,    Thank you for referring your patient, Lee Mckenzie, to the [facility] at [facility]. Please see a copy of my visit note below.    REASON FOR VISIT: S/P L5-S1 spinal fusion    REFERRING PHYSICIAN: Charlie Nixon     PRIMARY CARE PHYSICIAN: Cassius Josue    HISTORY OF PRESENT ILLNESS: Lee Mckenzie is a 38 year old male who returns to clinic today for his second post operative appointment following L5-S1 spinal fusion, which was performed by Dr. Nixon on 11/5/18.  Overall, he reports that he is doing well and he feels about 60% better than he did before surgery.  He notes occasional low back pain, particularly over the left incision with bending.  It is not constant.  He is using a muscle relaxer as needed, as well as Aleve.  He is no longer taking any narcotics.  He has done any physical therapy and does not think he needs it.  He has questions about returning to work.     Oswestry score: 5% (previously 40%)  Lfsknt22: 32 (previously 22)  Pain scale: 0 (previously 4)    PHYSICAL EXAM:   Constitutional - Patient is healthy, well-nourished and appears stated age.    BMI = 27.22    Respiratory - Patient is breathing normally and in no respiratory distress.   Skin - No suspicious rashes or lesions.  Surgical incisions are well healed.   Psychiatric - Normal mood and affect.   Cardiovascular - Extremities are warm and well perfused.   Eyes - Visual acuity is normal to the written word.   ENT - Hearing intact to the spoken word.   GI - No abdominal distention.   Musculoskeletal - Non-antalgic gait without use of assistive devices.          Lumbar Spine:    Appearance - Stands with head centered above the pelvis    Palpation - Non-tender to palpation in the midline and over the lateral incisions    ROM - Deferred    Motor -        LOWER EXTREMITY Left  Right   Hip flexion 5/5 5/5   Knee flexion 5/5 5/5   Knee extension 5/5 5/5   Ankle dorsiflexion 5/5 5/5   Ankle plantarflexion 5/5 5/5   Great toe extension 5/5 5/5       Neurologic - Sensation intact to light touch bilaterally.        IMAGING: Radiographs of the lumbar spine were obtained today.  They show instrumentation that is intact without evidence of loosening, fracture or migration. See full radiologic report in chart.    CLINICAL ASSESSMENT:   S/P L5-S1 spinal fusion    DISCUSSION/PLAN:   Lee is a 38 year old male who returned to clinic today for his second post operative appointment following lumbar spinal fusion, which was performed by Dr. Nixon on 11/5/18.  He is progressing as expected.  Radiographs were obtained today, and there was no evidence of instrumentation failure or adjacent segment degeneration.  At this point, he can return to work, but he should listen to his body and avoid activities that cause pain.  A work note was provided today.  He should return to clinic again in three months for repeat xrays (upright EOS AP and lateral of the lumbar spine) and further evaluation.    All questions and concerns were answered to the patient's apparent satisfaction before leaving the clinic.     Thank you for allowing Dr. Nixon and I to participate in the care of Lee Mckenzie.    Respectfully,  Caryn Shekih PA-C    Attestation:  I (Dr. Charlie Nixon - Spine Surgeon) have personally evaluated patient with JEREMIAH Sheikh, and agree with findings and plan outlined in the note.  I discussed at length with the patient/family, explained the nature of spinal condition, and formulated workup and/or treatment plan together.  All questions were answered to the best of my ability and to patient's apparent satisfaction.      CC  Copy to patient    [address] [facility] [address]    Again, thank you for allowing me to participate in the care of your patient.       Sincerely,    Charlie Nixon MD

## 2019-04-23 DIAGNOSIS — M43.20 FUSION OF SPINE, SITE UNSPECIFIED: Primary | ICD-10-CM

## 2019-04-29 NOTE — PROGRESS NOTES
"Reason for Visit:  Chief Complaint   Patient presents with     Surgical Followup     DOS 11/5/18 S/P Part 1 (supine): ALIF L5-S1; use of rhBMP-2 Part 2 (prone): Pedicle screw fixation L5-S1; Right hemilaminectomy-diskectomy L5-S1     RECHECK     States he has muscle soreness.        S>  40/m, 6 mos postop.  Last seen at 3 mos (1/29/19).  At that point, reported to be doing \"60%\" better than preop.    Accompanied by wife.  Doing great.  Much better vs preop.  Very satisfied.  occ'l back soreness but tolerabel.  Back to fulltime work as .    Off opioids.      Oswestry (LAURYN) Questionnaire    OSWESTRY DISABILITY INDEX 4/30/2019   Count 9   Sum 3   Oswestry Score (%) 6.67      Preop LAURYN = 44%  6 wks = 40%  3 mos = 5%  6 mos = 6.67%      O>   Alert, oriented x 3, cooperative.  Not in CP distress.  Ht 1.778 m (5' 10\")   Wt 90.7 kg (200 lb)   BMI 28.70 kg/m    Surgical incision well-healed, no sign of infection.  Ambulates independently.   Grossly neurologically intact.    Imaging:   EOS lumbar ap-lat shows stable ALIF with perc screw fixation L5-S1; no sign of loosening/failure.    A>  6 mos postop, doing very well.    P>   Congratulated and reassured patient.  At this point, advised to continue taking common sense approach to activities.  Letter re work provided.   - Continue full time work; allow 10 min breaks as needed for flare-up.    RTC 6 mos (1 yr postop) with lumbar CT for fusion status eval.    TT > 5 min, > 50% CC.      Charlie Nixon MD    Orthopaedic Spine Surgery  Dept Orthopaedic Surgery, [facility]  [phone] office, [phone] pager  [email or web address]    "

## 2019-04-30 ENCOUNTER — ANCILLARY PROCEDURE (OUTPATIENT)
Dept: GENERAL RADIOLOGY | Facility: CLINIC | Age: 39
End: 2019-04-30
Attending: ORTHOPAEDIC SURGERY
Payer: COMMERCIAL

## 2019-04-30 ENCOUNTER — OFFICE VISIT (OUTPATIENT)
Dept: ORTHOPEDICS | Facility: CLINIC | Age: 39
End: 2019-04-30
Payer: COMMERCIAL

## 2019-04-30 VITALS — BODY MASS INDEX: 28.63 KG/M2 | HEIGHT: 70 IN | WEIGHT: 200 LBS

## 2019-04-30 DIAGNOSIS — Z98.1 S/P SPINAL FUSION: Primary | ICD-10-CM

## 2019-04-30 DIAGNOSIS — M43.20 FUSION OF SPINE, SITE UNSPECIFIED: ICD-10-CM

## 2019-04-30 ASSESSMENT — ENCOUNTER SYMPTOMS
MYALGIAS: 1
MUSCLE WEAKNESS: 0
NECK PAIN: 0
JOINT SWELLING: 0
STIFFNESS: 0
BACK PAIN: 1
MUSCLE CRAMPS: 0
ARTHRALGIAS: 0

## 2019-04-30 ASSESSMENT — MIFFLIN-ST. JEOR: SCORE: 1823.44

## 2019-04-30 NOTE — LETTER
Return to Work  2019     Seen today: yes    Patient:  Lee Mckenzie  :   1980  MRN:     [id number]  Physician: GENARO KHALIL      Lee Mckenzie may continue working.    The next clinic appointment is scheduled for (date/time) 6 months with lumbar CT (1 yr postop).    Patient limitations:    He is now 6 mos s/p lumbar fusion.  Advised to continue taking common-sense approach to activities.  Pls allow 10 min breaks as needed for occasional back pain flare-up.  Otherwise, as long as back feels fine, may continue working full-time.  Activities as tolerated.            Electronically signed by Genaro Khalil MD

## 2019-04-30 NOTE — NURSING NOTE
"Reason For Visit:   Chief Complaint   Patient presents with     Surgical Followup     DOS 11/5/18 S/P Part 1 (supine): ALIF L5-S1; use of rhBMP-2 Part 2 (prone): Pedicle screw fixation L5-S1; Right hemilaminectomy-diskectomy L5-S1     RECHECK     States he has muscle soreness.      Primary MD: No Ref-Primary, Physician        ?  No  Occupation   Currently working? No  Work status?  medical leave  Date of injury: None.  Date of surgery: 11/5/18  Type of surgery:  Part 1 (supine): ALIF L5-S1; use of rhBMP-2 Part 2 (prone): Pedicle screw fixation L5-S1; Right hemilaminectomy-diskectomy L5-S1  Smoker: Yes        Ht 1.778 m (5' 10\")   Wt 90.7 kg (200 lb)   BMI 28.70 kg/m      Pain Assessment  Patient Currently in Pain: Yes  0-10 Pain Scale: 1  Primary Pain Location: Back  Pain Descriptors: Sore    Oswestry (LAURYN) Questionnaire    OSWESTRY DISABILITY INDEX 4/30/2019   Count 9   Sum 3   Oswestry Score (%) 6.67        Visual Analog Pain Scale  Back Pain Scale 0-10: 0.5  Right leg pain: 0  Left leg pain: 0    Promis 10 Assessment    PROMIS 10 4/30/2019   In general, would you say your health is: Very good   In general, would you say your quality of life is: Very good   In general, how would you rate your physical health? Very good   In general, how would you rate your mental health, including your mood and your ability to think? Very good   In general, how would you rate your satisfaction with your social activities and relationships? Very good   In general, please rate how well you carry out your usual social activities and roles Good   To what extent are you able to carry out your everyday physical activities such as walking, climbing stairs, carrying groceries, or moving a chair? Completely   How often have you been bothered by emotional problems such as feeling anxious, depressed or irritable? Sometimes   How would you rate your fatigue on average? Mild   How would you rate your pain on average?   0 = " No Pain  to  10 = Worst Imaginable Pain 2   In general, would you say your health is: 4   In general, would you say your quality of life is: 4   In general, how would you rate your physical health? 4   In general, how would you rate your mental health, including your mood and your ability to think? 4   In general, how would you rate your satisfaction with your social activities and relationships? 4   In general, please rate how well you carry out your usual social activities and roles. (This includes activities at home, at work and in your community, and responsibilities as a parent, child, spouse, employee, friend, etc.) 3   To what extent are you able to carry out your everyday physical activities such as walking, climbing stairs, carrying groceries, or moving a chair? 5   In the past 7 days, how often have you been bothered by emotional problems such as feeling anxious, depressed, or irritable? 3   In the past 7 days, how would you rate your fatigue on average? 2   In the past 7 days, how would you rate your pain on average, where 0 means no pain, and 10 means worst imaginable pain? 2   Global Mental Health Score 15   Global Physical Health Score 17   PROMIS TOTAL - SUBSCORES 32                Mirna Vidales LPN\

## 2019-04-30 NOTE — LETTER
"4/30/2019       RE: Lee Mckenzie  [address] [facility] [address]     Dear Colleague,    Thank you for referring your patient, Lee Mckenzie, to the [facility] at [facility]. Please see a copy of my visit note below.    Reason for Visit:  Chief Complaint   Patient presents with     Surgical Followup     DOS 11/5/18 S/P Part 1 (supine): ALIF L5-S1; use of rhBMP-2 Part 2 (prone): Pedicle screw fixation L5-S1; Right hemilaminectomy-diskectomy L5-S1     RECHECK     States he has muscle soreness.        S>  40/m, 6 mos postop.  Last seen at 3 mos (1/29/19).  At that point, reported to be doing \"60%\" better than preop.    Accompanied by wife.  Doing great.  Much better vs preop.  Very satisfied.  occ'l back soreness but tolerabel.  Back to fulltime work as .    Off opioids.      Oswestry (LAURYN) Questionnaire    OSWESTRY DISABILITY INDEX 4/30/2019   Count 9   Sum 3   Oswestry Score (%) 6.67      Preop LAURYN = 44%  6 wks = 40%  3 mos = 5%  6 mos = 6.67%      O>   Alert, oriented x 3, cooperative.  Not in CP distress.  Ht 1.778 m (5' 10\")   Wt 90.7 kg (200 lb)   BMI 28.70 kg/m     Surgical incision well-healed, no sign of infection.  Ambulates independently.   Grossly neurologically intact.    Imaging:   EOS lumbar ap-lat shows stable ALIF with perc screw fixation L5-S1; no sign of loosening/failure.    A>  6 mos postop, doing very well.    P>   Congratulated and reassured patient.  At this point, advised to continue taking common sense approach to activities.  Letter re work provided.   - Continue full time work; allow 10 min breaks as needed for flare-up.    RTC 6 mos (1 yr postop) with lumbar CT for fusion status eval.    TT > 5 min, > 50% CC.      Charlie Nixon MD    Orthopaedic Spine Surgery  Dept Orthopaedic Surgery, [facility]  [phone] office, [phone] pager  [email or web address]      "

## 2019-11-03 NOTE — PROGRESS NOTES
"Spine Surgical Hx:  11/15/2018 - ALIF-PPS L5-S1; use of Infuse BMP Small kit; MIS R hemilaminectomy-diskectomy L5-S1 (Quanbrano/Wilber) for DDD with R posterolateral HNP.  [Implants: NuVasive Base titanium cage; Medtronic Voyager perc screw system; Medtronic METRx MIS system].      Reason for Visit:  Chief Complaint   Patient presents with     Surgical Followup     1 year F/U DOS 11/5/18 S/P Part 1 (supine): ALIF L5-S1; use of rhBMP-2 Part 2 (prone): Pedicle screw fixation L5-S1; Right hemilaminectomy-diskectomy L5-S1       S>  40/m, 1 yr postop; last seen at 6 mos, doing great, back to fulltime work, off opioids.    Accompanied by wife.  Very happy with surgery.  Preoperative symptoms much improved.  He said over the summer, there were times when he was completely asymptomatic, and he was very happy with this.  Over the last few months, he started reexperiencing some soreness in his back, but still way better compared to preoperative.    Continues to work full-time as a .  Off of opioids.    Oswestry (LAURYN) Questionnaire    OSWESTRY DISABILITY INDEX 11/5/2019   Count 8   Sum 11   Oswestry Score (%) 27.5      Preop LAURYN = 44%  6 wks = 40%  3 mos = 5%  6 mos = 6.67%  12 mos = 27.5%        O>   Alert, oriented x 3, cooperative.  Not in CP distress.  Ht 1.727 m (5' 8\")   Wt 83.9 kg (185 lb)   BMI 28.13 kg/m    Surgical incision well-healed, no sign of infection.  Ambulates independently.   Grossly neurologically intact.    Imaging:   Lumbar CT reviewed today.  This shows solid interbody fusion L5-S1, stable fixation.  No sign of loosening or failure.    A>   1 year postoperative, doing excellent.    P>    I congratulated and reassured patient and wife.  CT scan shows unquestionably solid fusion across L5-S1.  In this sense, the goal of surgery had been achieved.  His segmental alignment at that level is also much better compared to preoperative.  Symptom wise, he is also doing much better than before.  I am very " happy for him.    At this point, I advised him to continue with taking a commonsense approach to activities.  I will be happy to see him back in the future on an as-needed basis.    TT > 5 min, > 50% CC.      Charlie Nixon MD    Orthopaedic Spine Surgery  Dept Orthopaedic Surgery, [facility]  [phone] office, [phone] pager  [email or web address]

## 2019-11-05 ENCOUNTER — OFFICE VISIT (OUTPATIENT)
Dept: ORTHOPEDICS | Facility: CLINIC | Age: 39
End: 2019-11-05
Payer: COMMERCIAL

## 2019-11-05 ENCOUNTER — ANCILLARY PROCEDURE (OUTPATIENT)
Dept: CT IMAGING | Facility: CLINIC | Age: 39
End: 2019-11-05
Attending: ORTHOPAEDIC SURGERY
Payer: COMMERCIAL

## 2019-11-05 VITALS — HEIGHT: 68 IN | BODY MASS INDEX: 28.04 KG/M2 | WEIGHT: 185 LBS

## 2019-11-05 DIAGNOSIS — Z98.1 S/P SPINAL FUSION: ICD-10-CM

## 2019-11-05 DIAGNOSIS — Z98.1 S/P SPINAL FUSION: Primary | ICD-10-CM

## 2019-11-05 ASSESSMENT — ENCOUNTER SYMPTOMS
WEIGHT GAIN: 0
POLYPHAGIA: 0
BACK PAIN: 1
ALTERED TEMPERATURE REGULATION: 0
NIGHT SWEATS: 1
FATIGUE: 0
WEIGHT LOSS: 0
FEVER: 0
POLYDIPSIA: 0
NECK PAIN: 0
HALLUCINATIONS: 0
STIFFNESS: 0
CHILLS: 0
MUSCLE WEAKNESS: 0
MYALGIAS: 1
ARTHRALGIAS: 0
INCREASED ENERGY: 0
JOINT SWELLING: 0
MUSCLE CRAMPS: 0
DECREASED APPETITE: 0

## 2019-11-05 ASSESSMENT — MIFFLIN-ST. JEOR: SCORE: 1723.65

## 2019-11-05 NOTE — NURSING NOTE
"Reason For Visit:   Chief Complaint   Patient presents with     Surgical Followup     1 year F/U DOS 11/5/18 S/P Part 1 (supine): ALIF L5-S1; use of rhBMP-2 Part 2 (prone): Pedicle screw fixation L5-S1; Right hemilaminectomy-diskectomy L5-S1       Primary MD: Cassius Josue  ?  No  Occupation   Currently working? No  Work status?  medical leave  Date of injury: None.  Date of surgery: 11/5/18  Type of surgery:  Part 1 (supine): ALIF L5-S1; use of rhBMP-2 Part 2 (prone): Pedicle screw fixation L5-S1; Right hemilaminectomy-diskectomy L5-S1  Smoker: Yes       Ht 1.727 m (5' 8\")   Wt 83.9 kg (185 lb)   BMI 28.13 kg/m      Pain Assessment  Patient Currently in Pain: Yes  0-10 Pain Scale: 3  Primary Pain Location: Back  Pain Descriptors: Discomfort    Oswestry (LAURYN) Questionnaire    OSWESTRY DISABILITY INDEX 11/5/2019   Count 8   Sum 11   Oswestry Score (%) 27.5          Visual Analog Pain Scale  Back Pain Scale 0-10: 2.5  Right leg pain: 0  Left leg pain: 0    Promis 10 Assessment    PROMIS 10 11/5/2019   In general, would you say your health is: Very good   In general, would you say your quality of life is: Very good   In general, how would you rate your physical health? Good   In general, how would you rate your mental health, including your mood and your ability to think? Good   In general, how would you rate your satisfaction with your social activities and relationships? Very good   In general, please rate how well you carry out your usual social activities and roles Good   To what extent are you able to carry out your everyday physical activities such as walking, climbing stairs, carrying groceries, or moving a chair? Mostly   How often have you been bothered by emotional problems such as feeling anxious, depressed or irritable? Rarely   How would you rate your fatigue on average? Mild   How would you rate your pain on average?   0 = No Pain  to  10 = Worst Imaginable Pain 4   In general, would " you say your health is: 4   In general, would you say your quality of life is: 4   In general, how would you rate your physical health? 3   In general, how would you rate your mental health, including your mood and your ability to think? 3   In general, how would you rate your satisfaction with your social activities and relationships? 4   In general, please rate how well you carry out your usual social activities and roles. (This includes activities at home, at work and in your community, and responsibilities as a parent, child, spouse, employee, friend, etc.) 3   To what extent are you able to carry out your everyday physical activities such as walking, climbing stairs, carrying groceries, or moving a chair? 4   In the past 7 days, how often have you been bothered by emotional problems such as feeling anxious, depressed, or irritable? 2   In the past 7 days, how would you rate your fatigue on average? 2   In the past 7 days, how would you rate your pain on average, where 0 means no pain, and 10 means worst imaginable pain? 4   Global Mental Health Score 15   Global Physical Health Score 14   PROMIS TOTAL - SUBSCORES 29                Mirna Vidales LPN

## 2019-11-05 NOTE — LETTER
"11/5/2019       RE: Lee Mckenzie  [address] [facility] [address]     Dear Colleague,    Thank you for referring your patient, Lee Mckenzie, to the [facility] at [facility]. Please see a copy of my visit note below.    Spine Surgical Hx:  11/15/2018 - ALIF-PPS L5-S1; use of Infuse BMP Small kit; MIS R hemilaminectomy-diskectomy L5-S1 (Sembrano/Wilber) for DDD with R posterolateral HNP.  [Implants: NuVasive Base titanium cage; Medtronic Voyager perc screw system; Medtronic METRx MIS system].      Reason for Visit:  Chief Complaint   Patient presents with     Surgical Followup     1 year F/U DOS 11/5/18 S/P Part 1 (supine): ALIF L5-S1; use of rhBMP-2 Part 2 (prone): Pedicle screw fixation L5-S1; Right hemilaminectomy-diskectomy L5-S1       S>  38/m, 1 yr postop; last seen at 6 mos, doing great, back to fulltime work, off opioids.    Accompanied by wife.  Very happy with surgery.  Preoperative symptoms much improved.  He said over the summer, there were times when he was completely asymptomatic, and he was very happy with this.  Over the last few months, he started reexperiencing some soreness in his back, but still way better compared to preoperative.    Continues to work full-time as a .  Off of opioids.    Oswestry (LAURYN) Questionnaire    OSWESTRY DISABILITY INDEX 11/5/2019   Count 8   Sum 11   Oswestry Score (%) 27.5      Preop LAURYN = 44%  6 wks = 40%  3 mos = 5%  6 mos = 6.67%  12 mos = 27.5%        O>   Alert, oriented x 3, cooperative.  Not in CP distress.  Ht 1.727 m (5' 8\")   Wt 83.9 kg (185 lb)   BMI 28.13 kg/m     Surgical incision well-healed, no sign of infection.  Ambulates independently.   Grossly neurologically intact.    Imaging:   Lumbar CT reviewed today.  This shows solid interbody fusion L5-S1, stable fixation.  No sign of loosening or failure.    A>   1 year postoperative, doing excellent.    P>    I congratulated and " reassured patient and wife.  CT scan shows unquestionably solid fusion across L5-S1.  In this sense, the goal of surgery had been achieved.  His segmental alignment at that level is also much better compared to preoperative.  Symptom wise, he is also doing much better than before.  I am very happy for him.    At this point, I advised him to continue with taking a commonsense approach to activities.  I will be happy to see him back in the future on an as-needed basis.    TT > 5 min, > 50% CC.      Charlie Nixon MD    Orthopaedic Spine Surgery  Dept Orthopaedic Surgery, [facility]  [phone] office, [phone] pager  [email or web address]

## 2020-03-11 ENCOUNTER — HEALTH MAINTENANCE LETTER (OUTPATIENT)
Age: 40
End: 2020-03-11

## 2021-01-03 ENCOUNTER — HEALTH MAINTENANCE LETTER (OUTPATIENT)
Age: 41
End: 2021-01-03

## 2021-04-25 ENCOUNTER — HEALTH MAINTENANCE LETTER (OUTPATIENT)
Age: 41
End: 2021-04-25

## 2021-10-10 ENCOUNTER — HEALTH MAINTENANCE LETTER (OUTPATIENT)
Age: 41
End: 2021-10-10

## 2022-04-14 NOTE — PLAN OF CARE
Per [facility], [address] [place] [address] pt can sign consent Kelsi Tejeda will also reach out to Rigo Dozier office re: medication instructions for DOS    preinstruction sheet faxed to [phone] for final report    Wife, Adrian Never- was contacted and she understands he has procedure planned for 4/29/2022- she will try to make it there for DOS.     Bibi at  at [address] [place] [address] will call us if any issues w transportation, we requested [facility] to accompany patient DOS Problem: Patient Care Overview  Goal: Plan of Care/Patient Progress Review  Outcome: Improving        VS:   VSS.LS clear,satting well at room air.   Output:   Is voiding well using urinal.Passing some gas this morning.   Activity:   Independent with bed mobilities.Stood on edge of bed.   Skin: Intact except incisions on back and front.Tattoos.   Pain:   Back pain managed with oxycodone 15mg,scheduled tylenol.   Neuro/CMS:   No numbness/tingling sensation.Good strengths on all extremitiesStrong pedal pulses.   Dressing(s):   Back drsg with dried drainage.Front drsg,CDI.   Diet:   Passed some gas this morning,denies nausea.ADAT.   LDA:   PIV line L hand for IV fluid,PIV line R hand saline locked.   Equipment:   PCDs on.Cont pulse oximetry.   Plan:   Plan to work with therapy.Might discharge this pm if pain and mobility are okay per ortho.   Additional Info:

## 2022-05-21 ENCOUNTER — HEALTH MAINTENANCE LETTER (OUTPATIENT)
Age: 42
End: 2022-05-21

## 2022-09-18 ENCOUNTER — HEALTH MAINTENANCE LETTER (OUTPATIENT)
Age: 42
End: 2022-09-18

## 2022-12-15 NOTE — PROGRESS NOTES
Orthopaedic Surgery Progress Note   November 16, 2018    HPI: 38 yo male s/p ALIF L5-S1 and bilateral percutaneous pedicle screw fixation L5-S1 on 11/5/18. He was discharged to home on POD#1. He reports that he has a small area of firm swelling beneath his abdominal incision right away, but over the past 3-4 days the area has doubled in size. It has never been painful, erythematous, or had active drainage. However, due to the increase in size, he presented to the local ED. Ultrasound was used to evaluate the area and found a 4cm fluid collection consistent with seroma vs. Hematoma. The patient was referred to our ED for wound evaluation.    Patient currently endorses back pain. This has been getting progressively better since surgery. He denies any signs of infection.    He is accompanied by his wife    Objective: /75  Pulse 73  Temp 96.7  F (35.9  C)  Resp 16  Wt 81.2 kg (179 lb)  SpO2 99%  BMI 27.22 kg/m2    Walking around room, changing positions frequently. Back incisions with bandages c/d/i and no evidence of drainage or erythema. Abdominal incision with firm mass beneath well healed incision. Mass feels to be approximately 4x2cm in size. It is nontender to touch. There is no erythema. There is no drainage.    Assessment and Plan: Lee Mckenzie is a 37 year old male 11 days s/p ALIF and bilateral percutaneous fixation L5-S1 with likely seroma beneath abdominal incision    -recommend CRP, ESR, CBC; if they are not elevated, patient may discharge to home  -recommend against aspiration and allow seroma fluid to resolve without intervention  -discussed warning signs of infection including increased pain, erythema, drainage, fever.    Bri Hernandez MD  Orthopaedic Surgery Resident, PGY-4  Pager: [phone]       normal range of motion

## 2023-06-04 ENCOUNTER — HEALTH MAINTENANCE LETTER (OUTPATIENT)
Age: 43
End: 2023-06-04

## 2024-08-27 ENCOUNTER — MEDICAL CORRESPONDENCE (OUTPATIENT)
Dept: HEALTH INFORMATION MANAGEMENT | Facility: CLINIC | Age: 44
End: 2024-08-27

## 2024-09-03 ENCOUNTER — TRANSCRIBE ORDERS (OUTPATIENT)
Dept: OTHER | Age: 44
End: 2024-09-03

## 2024-09-03 DIAGNOSIS — M54.12 CERVICAL RADICULOPATHY: Primary | ICD-10-CM

## 2024-09-17 DIAGNOSIS — M54.12 CERVICAL RADICULOPATHY: Primary | ICD-10-CM

## 2024-09-22 ENCOUNTER — HEALTH MAINTENANCE LETTER (OUTPATIENT)
Age: 44
End: 2024-09-22

## 2024-09-25 NOTE — TELEPHONE ENCOUNTER
Action 2024 9:39 AM MT   Action Taken Sent a request for imaging disk from [facility].  [facility] trackin     Action 2024 2:13 PM MT   Action Taken Called [facility] because there was no movement on our tracking label. They used their own shipping label instead. [employer] Tracking #: [id number]. The image disk is in [place] right now and delivery is planned for tomorrow.     DIAGNOSIS: Cervical radiculopathy [M54.12]  - Primary   APPOINTMENT DATE: 10/01/2024   NOTES STATUS DETAILS   OFFICE NOTE from referring provider Care Everywhere 2024 - Queta Hair MD - [facility]   OFFICE NOTE from other specialist Internal Last Seen:  2019 - Charlie Nixon MD - [facility]   OPERATIVE REPORT Internal 2018 -   Procedures:   Part 1 - anterior procedure:  1.  Anterior lumbar interbody fusion (ALIF) L5-S1, using Infuse rhBMP-2 and crushed cancellous allograft.  2.  Placement of interbody titanium cage L5-S1.  3.  Buttress fixation of cage into L5 vertebral body using integrated screw thru cage.     Part 2 - posterior procedure:  1.  Bilateral percutaneous pedicle screw fixation L5-S1.  2.  MIS right hemilaminectomy, medial facetectomy, foraminotomy and diskectomy L5-S1.  3.  Computer navigation using O-arm and Stealth.  4.  Use of operating microscope.   MRI PACS [facility]:  2024 - [facility]   CT SCAN PACS [facility]:  2022 - [facility]   XRAYS (IMAGES & REPORTS) PACS [facility]:  01/15/2024 - [facility]

## 2024-10-01 ENCOUNTER — ANCILLARY PROCEDURE (OUTPATIENT)
Dept: GENERAL RADIOLOGY | Facility: CLINIC | Age: 44
End: 2024-10-01
Attending: ORTHOPAEDIC SURGERY
Payer: COMMERCIAL

## 2024-10-01 ENCOUNTER — PRE VISIT (OUTPATIENT)
Dept: ORTHOPEDICS | Facility: CLINIC | Age: 44
End: 2024-10-01

## 2024-10-01 ENCOUNTER — OFFICE VISIT (OUTPATIENT)
Dept: ORTHOPEDICS | Facility: CLINIC | Age: 44
End: 2024-10-01
Payer: COMMERCIAL

## 2024-10-01 VITALS — HEIGHT: 68 IN | BODY MASS INDEX: 25.01 KG/M2 | WEIGHT: 165 LBS

## 2024-10-01 DIAGNOSIS — M54.12 CERVICAL RADICULOPATHY: ICD-10-CM

## 2024-10-01 DIAGNOSIS — M47.22 CERVICAL SPONDYLOSIS WITH RADICULOPATHY: Primary | ICD-10-CM

## 2024-10-01 DIAGNOSIS — M54.12 CERVICAL RADICULOPATHY: Primary | ICD-10-CM

## 2024-10-01 PROCEDURE — 72082 X-RAY EXAM ENTIRE SPI 2/3 VW: CPT | Performed by: STUDENT IN AN ORGANIZED HEALTH CARE EDUCATION/TRAINING PROGRAM

## 2024-10-01 PROCEDURE — 72040 X-RAY EXAM NECK SPINE 2-3 VW: CPT | Performed by: RADIOLOGY

## 2024-10-01 PROCEDURE — 99204 OFFICE O/P NEW MOD 45 MIN: CPT | Performed by: ORTHOPAEDIC SURGERY

## 2024-10-01 PROCEDURE — 77073 BONE LENGTH STUDIES: CPT | Performed by: STUDENT IN AN ORGANIZED HEALTH CARE EDUCATION/TRAINING PROGRAM

## 2024-10-01 RX ORDER — ALBUTEROL SULFATE 90 UG/1
2 AEROSOL, METERED RESPIRATORY (INHALATION) EVERY 4 HOURS PRN
COMMUNITY
Start: 2023-12-19

## 2024-10-01 NOTE — LETTER
10/1/2024      Lee Mckenzie  [address] [facility] [address]      Dear Colleague,    Thank you for referring your patient, Lee Mckenzie, to the [facility] [place]. Please see a copy of my visit note below.    In-Person Visit    Spine Surgical Hx:  11/15/2018 - ALIF-PPS L5-S1; use of Infuse BMP Small kit; MIS R hemilaminectomy-diskectomy L5-S1 (Sembrano/Wilber) for DDD with R posterolateral HNP.  [Implants: NuVasive Base titanium cage; Medtronic Voyager perc screw system; Medtronic METRx MIS system].      REASON FOR CONSULTATION: Consult (NEW CERVICAL SPINE)     REFERRING PHYSICIAN: Referred Self  PCP:Cassius Josue       History of Present Illness:  43 year old male, RHD, referred by Dr. Queta Hair ([facility]) for cervical radiculopathy.  Previously followed by me and on whom I performed L5-S1 fusion in 2018; last visit 11/5/19 (>3 yrs ago); thus, this is considered a new patient visit.    Accompanied by wife.  Last Dec/Jan (~10 mos ago), felt like he overdid things at work (works in welding and fabricating), no specific trauma.  Afterwards, felt R shoulder pain; may have also felt a pinch in his neck.  He thought he injured his R shoulder, because he was having a lot of soreness on the anterior shoulder and he was having difficulty raising his R arm.  He went in to have it looked at; had shoulder x-rays.  However, was later told it likely was coming from his neck.    When he sleeps on his left side (because of his back), he wakes up with severe neck pain, and it radiates down his R arm and hand.  Per patient, it involves his entire hand (nondermatomal).    Neck 70%, Arm 30%,  Right Only  Worse: sleeping on his left side; looking up and bending his neck to the right.  Better: holding his neck in flexion and some leftward bending.    Regarding his lower back, he said the surgery we did in 2018 definitely helped.  He was doing well for a long time.  He said that  it has now been starting to bother him more again, which she thinks is probably related to the adjacent segment.  However, his neck is still what bothers him most and is his priority.    Previous treatment:   No formal PT.  Injection (see below).  Meds: Tylenol and Alleve prn.  Tried gabapentin, but had adverse effects ('was messing with my head'), so stopped.    Previous Injections:  Per patient, he had 2 injections (cervicothoracic [place] LEELA) into his neck, performed by Dr. Hair.  First injection (~Feb 2024) gave some relief; 2nd injection (8/21/24) seemed to have made things worse.    PSHx:  , lives with wife.  Still going to work, but getting tougher and tougher.  Smokes 1 ppd.  Per patient, he quit smoking for a few months when we did fusion surgery in 2018.    Per patient, the PCP listed in his chart (Dr. Josue) is now retired.  He does not yet hav a new one.      Oswestry (LAURYN) Questionnaire        10/1/2024     9:33 AM   OSWESTRY DISABILITY INDEX   Count 10   Sum 14   Oswestry Score (%) 28 %      S/p ALIF-PPS L5-S1 (11/15/18):  Preop LAURYN 44%  6wks  40%  3mo  5%  6mo  6.67%  1yr  27.5%  6yr  28%      Neck Disability Index (NDI) Questionnaire        10/1/2024     9:55 AM   Neck Disability Index (NDI)   Neck Disability Index: Count 10   NDI: Total Score = SUM (points for all 10 findings) 17   Neck Disability in Percent = (Total Score) / 50 * 100 34 (%)      NDI 10/1/24 34%      Visual Analog Pain Scale  Back Pain Scale 0-10: 4 (low back pain)  Right leg pain: 0  Left leg pain: 0  Neck Pain Scale 0-10: 3 (rt side neck pain)  Right arm pain: 0  Left arm pain: 0    PROMIS-10 Scores  Global Mental Health Score: (P) 7  Global Physical Health Score: (P) 11  PROMIS TOTAL - SUBSCORES: (P) 18    ROS:  A 12-point review of systems was completed and is negative except for otherwise noted above in the history of present illness.    Med Hx:  Past Medical History:   Diagnosis Date     Disc degeneration, lumbar       Osteoarthritis      Tobacco abuse        Surg Hx:  Past Surgical History:   Procedure Laterality Date     FUSION LUMBAR ANTERIOR ONE LEVEL N/A 11/5/2018    Procedure: Part 1:  (supine) Anterior Lumbar Interbody Fusion Lumbar 5-Sacral 1; Use Of rhBMP-2   ;  Surgeon: Charlie Nixon MD;  Location: [facility] OR     oral surgery       OTS FUSION SPINE POSTERIOR LUMBAR MINIMALLY INVASIVE ONE LEVEL N/A 11/5/2018    Procedure: Part 2:  (prone) Stealth Guided Pedicle Screw Fixation Lumbar 5 -Sacral 1; Right Hemilaminectomy-Discectomy Lumbar 5-Sacral 1;  Surgeon: Charlie Nixon MD;  Location: [facility] OR       Allergies:  Allergies   Allergen Reactions     Morphine Other (See Comments)     Sensitivity       Meds:  Current Outpatient Medications   Medication Sig Dispense Refill     acetaminophen (TYLENOL) 325 MG tablet Take 3 tablets (975 mg) by mouth every 8 hours 100 tablet 1     albuterol (PROAIR HFA/PROVENTIL HFA/VENTOLIN HFA) 108 (90 Base) MCG/ACT inhaler Inhale 2 puffs into the lungs every 4 hours as needed for shortness of breath.       Naproxen Sodium (ALEVE PO) Take 220-440 mg by mouth as needed        diclofenac (VOLTAREN) 1 % GEL topical gel Place onto the skin as needed  (Patient not taking: Reported on 10/1/2024)       docusate sodium (COLACE) 100 MG capsule Take 1 capsule (100 mg) by mouth 2 times daily as needed for constipation (no stool in 24 hrs) (Patient not taking: Reported on 12/18/2018) 60 capsule 1     HYDROcodone-acetaminophen (NORCO) 5-325 MG per tablet Take 1-2 tablets by mouth as needed for severe pain  (Patient not taking: Reported on 10/1/2024)       hydrOXYzine (ATARAX) 25 MG tablet Take 1-2 tablets (25-50 mg) by mouth every 6 hours as needed for itching (Patient not taking: Reported on 12/18/2018) 60 tablet 1     methocarbamol (ROBAXIN) 750 MG tablet Take 1 tablet (750 mg) by mouth every 6 hours as needed for muscle spasms (Patient not taking: Reported on 4/30/2019) 80 tablet 1      "oxyCODONE IR (ROXICODONE) 10 MG tablet Take 1-1.5 tablets (10-15 mg) by mouth every 4 hours as needed for moderate to severe pain (Patient not taking: Reported on 12/18/2018) 40 tablet 0     No current facility-administered medications for this visit.       Fam Hx:  No family history on file.    P/S Hx:  Social History     Tobacco Use     Smoking status: Former     Smokeless tobacco: Never     Tobacco comments:     Last cigerette 10/6/2018   Substance Use Topics     Alcohol use: No         Physical Exam:  Very pleasant, healthy appearing, alert, oriented x 3, cooperative.  Normal mood and affect.  Not in cardiorespiratory distress.  Ht 1.727 m (5' 8\")   Wt 74.8 kg (165 lb)   BMI 25.09 kg/m    Normal upright posture.    Normal gait without assistive device.  No antalgia / imbalance.  Able to do tandem gait.  Negative Romberg's.    No gross spinal deformity, no skin lesions or surgical scars.  Localizes pain at left paraspinal posterior neck.  Describes it as radiating down the right arm.  Tenderness: (+) midline, (+) left paraspinal lower neck region.  Spurling's on Right: reproduces concordant right paraspinal lower neck pain; no radiation down the arm.  Spurling's on Left: reproduces concordant right paraspinal lower neck pain, but a little less than R Spurling maneuver.  ROM:   Full cervical flexion.  Somewhat limited extension, with reproduction of posterior neck pain.    Neuro Exam:  Motor: 5/5 motor strength for all muscle groups in both upper extremities, including shoulder abduction, elbow flexion, wrist extension and flexion,  strength and finger abduction.  Sensory: Reports partial deficit to light touch over right index and long fingers; intact over all other areas, including first dorsal webspace.  Intact on all dermatomes left side.  Reflexes: 1+ biceps, brachioradialis and triceps.  No inverted radial reflex.  Negative Hoff's bilateral.    Imaging:    EOS full body AP lateral standing x-rays " obtained today reviewed.  Shows postsurgical changes from ALIF and bilateral pedicle screw fixation L5-S1.  No sign of loosening or failure.  Likely already with solid arthrodesis.  Acceptable sagittal alignment.  Cervical region shows advanced spondylosis C5-C7, with some loss of cervical lordosis.    Cervical MRI 2/8/24 shows 3 level disease (spondylosis) with canal stenosis, with some effacement of ventral CSF space C4-C7.  There is some degree of foraminal stenosis at those levels, but none that I would consider severe.  Thus, difficult to tell which level is most symptomatic in terms of producing his right arm pain.    Cervical CT scan 11/20/22 shows advanced spondylosis C5-C7.    Cervical flexion-extension x-rays taken today on way out of clinic shows advanced spondylosis with reduced motion C5-C7.  No segmental instability noted at any level.    Assessment:    43/m RHD with:  Cervical spine:  1.  Chronic axial neck pain with R arm radicular pain.  2.  Advanced spondylosis C5-C7, milder at C4-5, with Right radiculopathy.  Lumbar spine:  1.  6 yrs s/p ALIF-PPS L5-S1 (11/15/18) with solid arthrodesis.  Other:  1.  Chronic active smoking (~ 1 ppd).    Plan:    Had good long discussion with patient.  First of all, I congratulated him on his successful outcome from lumbar spine surgery that we did 6 years ago.  He said this lately has been getting worse again, but not as bad as his neck.  Regarding his chronic neck pain that radiates to his right arm, imaging studies show degenerative changes (spondylosis), with disc space narrowing and spinal canal narrowing (stenosis).  He had 2 previous cervical thoracic epidural injections, with inconsistent results.  At this point, I recommend exhausting nonoperative treatment, mainly in the form of physical therapy.  Explained to him that this is also an insurance requirement prior to approving any form of surgery.  If pain persists, may then consider 2 or 3 level fusion  versus arthroplasty.  Given the advanced spondylosis at those levels, uncertain whether arthroplasty would be able to restore motion.  Furthermore, if he is also getting pain from facet arthropathy, fusion may be better able to alleviate his symptoms.  On the other hand, given his smoking history, is also at higher risk for pseudarthrosis with fusion.  Regarding 2 versus 3 levels, I told him that fusion rates are much lower with two-level fusion versus 3.  In terms of arthroplasty, I do not think that insurance would approve of three-level arthroplasty, as FDA approval is only up to 2 levels.    - PT order placed.  - Right C7 nerve root block with steroid.  May be performed down in [place] c/o Dr. Nevarez.  - Updated cervical CT scan; his last CT scan was almost 2 years ago (11/20/2022).    Virtual return to clinic after above, to review response, and discuss further options as needed.    45 minutes spent on the date of the encounter doing chart review/review of outside records/review of test results/interpretation of tests/patient visit/documentation/discussion with other provider(s)/discussion with patient and family.    Charlie Nixon MD    Orthopaedic Spine Surgery  Dept Orthopaedic Surgery, [facility]  [phone] office, [phone] pager  [email or web address]      Again, thank you for allowing me to participate in the care of your patient.        Sincerely,        Charlie Nixon MD

## 2024-10-01 NOTE — PROGRESS NOTES
In-Person Visit    Spine Surgical Hx:  11/15/2018 - ALIF-PPS L5-S1; use of Infuse BMP Small kit; MIS R hemilaminectomy-diskectomy L5-S1 (Tiffani/Wilber) for DDD with R posterolateral HNP.  [Implants: NuVasive Base titanium cage; Medtronic Voyager perc screw system; Medtronic METRx MIS system].      REASON FOR CONSULTATION: Consult (NEW CERVICAL SPINE)     REFERRING PHYSICIAN: Referred Self  PCP:Cassius Josue       History of Present Illness:  43 year old male, RHD, referred by Dr. Queta Hair ([facility]) for cervical radiculopathy.  Previously followed by me and on whom I performed L5-S1 fusion in 2018; last visit 11/5/19 (>3 yrs ago); thus, this is considered a new patient visit.    Accompanied by wife.  Last Dec/Jan (~10 mos ago), felt like he overdid things at work (works in welding and fabricating), no specific trauma.  Afterwards, felt R shoulder pain; may have also felt a pinch in his neck.  He thought he injured his R shoulder, because he was having a lot of soreness on the anterior shoulder and he was having difficulty raising his R arm.  He went in to have it looked at; had shoulder x-rays.  However, was later told it likely was coming from his neck.    When he sleeps on his left side (because of his back), he wakes up with severe neck pain, and it radiates down his R arm and hand.  Per patient, it involves his entire hand (nondermatomal).    Neck 70%, Arm 30%,  Right Only  Worse: sleeping on his left side; looking up and bending his neck to the right.  Better: holding his neck in flexion and some leftward bending.    Regarding his lower back, he said the surgery we did in 2018 definitely helped.  He was doing well for a long time.  He said that it has now been starting to bother him more again, which she thinks is probably related to the adjacent segment.  However, his neck is still what bothers him most and is his priority.    Previous treatment:   No formal PT.  Injection (see below).  Meds:  Tylenol and Alleve prn.  Tried gabapentin, but had adverse effects ('was messing with my head'), so stopped.    Previous Injections:  2/19/24 - C7-T1 IL LEELA (Dr. Hair, [facility]).  Per patient, got some relief.  8/21/24 - Rpt C7-T1 IL LEELA (Dr. Hair, [place]).  Per patient, did not help and even seemed to make things worse.    PSHx:  , lives with wife.  Still going to work, but getting tougher and tougher.  Smokes 1 ppd.  Per patient, he quit smoking for a few months when we did fusion surgery in 2018.    Per patient, the PCP listed in his chart (Dr. Josue) is now retired.  He does not yet hav a new one.      Oswestry (LAURYN) Questionnaire        10/1/2024     9:33 AM   OSWESTRY DISABILITY INDEX   Count 10   Sum 14   Oswestry Score (%) 28 %      S/p ALIF-PPS L5-S1 (11/15/18):  Preop LAURYN 44%  6wks  40%  3mo  5%  6mo  6.67%  1yr  27.5%  6yr  28%      Neck Disability Index (NDI) Questionnaire        10/1/2024     9:55 AM   Neck Disability Index (NDI)   Neck Disability Index: Count 10   NDI: Total Score = SUM (points for all 10 findings) 17   Neck Disability in Percent = (Total Score) / 50 * 100 34 (%)      NDI 10/1/24 34%      Visual Analog Pain Scale  Back Pain Scale 0-10: 4 (low back pain)  Right leg pain: 0  Left leg pain: 0  Neck Pain Scale 0-10: 3 (rt side neck pain)  Right arm pain: 0  Left arm pain: 0    PROMIS-10 Scores  Global Mental Health Score: (P) 7  Global Physical Health Score: (P) 11  PROMIS TOTAL - SUBSCORES: (P) 18    ROS:  A 12-point review of systems was completed and is negative except for otherwise noted above in the history of present illness.    Med Hx:  Past Medical History:   Diagnosis Date    Disc degeneration, lumbar     Osteoarthritis     Tobacco abuse        Surg Hx:  Past Surgical History:   Procedure Laterality Date    FUSION LUMBAR ANTERIOR ONE LEVEL N/A 11/5/2018    Procedure: Part 1:  (supine) Anterior Lumbar Interbody Fusion Lumbar 5-Sacral 1; Use Of rhBMP-2    ;  Surgeon: Charlie Nixon MD;  Location: [facility] OR    oral surgery      OTS FUSION SPINE POSTERIOR LUMBAR MINIMALLY INVASIVE ONE LEVEL N/A 11/5/2018    Procedure: Part 2:  (prone) Stealth Guided Pedicle Screw Fixation Lumbar 5 -Sacral 1; Right Hemilaminectomy-Discectomy Lumbar 5-Sacral 1;  Surgeon: Charlie Nixon MD;  Location: [facility] OR       Allergies:  Allergies   Allergen Reactions    Morphine Other (See Comments)     Sensitivity       Meds:  Current Outpatient Medications   Medication Sig Dispense Refill    acetaminophen (TYLENOL) 325 MG tablet Take 3 tablets (975 mg) by mouth every 8 hours 100 tablet 1    albuterol (PROAIR HFA/PROVENTIL HFA/VENTOLIN HFA) 108 (90 Base) MCG/ACT inhaler Inhale 2 puffs into the lungs every 4 hours as needed for shortness of breath.      Naproxen Sodium (ALEVE PO) Take 220-440 mg by mouth as needed       diclofenac (VOLTAREN) 1 % GEL topical gel Place onto the skin as needed  (Patient not taking: Reported on 10/1/2024)      docusate sodium (COLACE) 100 MG capsule Take 1 capsule (100 mg) by mouth 2 times daily as needed for constipation (no stool in 24 hrs) (Patient not taking: Reported on 12/18/2018) 60 capsule 1    HYDROcodone-acetaminophen (NORCO) 5-325 MG per tablet Take 1-2 tablets by mouth as needed for severe pain  (Patient not taking: Reported on 10/1/2024)      hydrOXYzine (ATARAX) 25 MG tablet Take 1-2 tablets (25-50 mg) by mouth every 6 hours as needed for itching (Patient not taking: Reported on 12/18/2018) 60 tablet 1    methocarbamol (ROBAXIN) 750 MG tablet Take 1 tablet (750 mg) by mouth every 6 hours as needed for muscle spasms (Patient not taking: Reported on 4/30/2019) 80 tablet 1    oxyCODONE IR (ROXICODONE) 10 MG tablet Take 1-1.5 tablets (10-15 mg) by mouth every 4 hours as needed for moderate to severe pain (Patient not taking: Reported on 12/18/2018) 40 tablet 0     No current facility-administered medications for this visit.       Fam Hx:  No  "family history on file.    P/S Hx:  Social History     Tobacco Use    Smoking status: Former    Smokeless tobacco: Never    Tobacco comments:     Last cigerette 10/6/2018   Substance Use Topics    Alcohol use: No         Physical Exam:  Very pleasant, healthy appearing, alert, oriented x 3, cooperative.  Normal mood and affect.  Not in cardiorespiratory distress.  Ht 1.727 m (5' 8\")   Wt 74.8 kg (165 lb)   BMI 25.09 kg/m    Normal upright posture.    Normal gait without assistive device.  No antalgia / imbalance.  Able to do tandem gait.  Negative Romberg's.    No gross spinal deformity, no skin lesions or surgical scars.  Localizes pain at Right paraspinal posterior neck.  Describes it as radiating down the right arm.  Tenderness: (+) midline, (+) Right paraspinal lower neck region.  Spurling's on Right: reproduces concordant right paraspinal lower neck pain; no radiation down the arm.  Spurling's on Left: reproduces concordant right paraspinal lower neck pain, but a little less than R Spurling maneuver.  ROM:   Full cervical flexion.  Somewhat limited extension, with reproduction of posterior neck pain.    Neuro Exam:  Motor: 5/5 motor strength for all muscle groups in both upper extremities, including shoulder abduction, elbow flexion, wrist extension and flexion,  strength and finger abduction.  Sensory: Reports partial deficit to light touch over right index and long fingers; intact over all other areas, including first dorsal webspace.  Intact on all dermatomes left side.  Reflexes: 1+ biceps, brachioradialis and triceps.  No inverted radial reflex.  Negative Hoff's bilateral.    Imaging:    EOS full body AP lateral standing x-rays obtained today reviewed.  Shows postsurgical changes from ALIF and bilateral pedicle screw fixation L5-S1.  No sign of loosening or failure.  Likely already with solid arthrodesis.  Acceptable sagittal alignment.  Cervical region shows advanced spondylosis C5-C7, with some " loss of cervical lordosis.    Cervical MRI 2/8/24 shows 3 level disease (spondylosis) with canal stenosis, with some effacement of ventral CSF space C4-C7.  There is some degree of foraminal stenosis at those levels, but none that I would consider severe.  Thus, difficult to tell which level is most symptomatic in terms of producing his right arm pain.    Cervical CT scan 11/20/22 shows advanced spondylosis C5-C7.    Cervical flexion-extension x-rays taken today on way out of clinic shows advanced spondylosis with reduced motion C5-C7.  No segmental instability noted at any level.    Assessment:    43/m RHD with:  Cervical spine:  1.  Chronic axial neck pain with R arm radicular pain.  2.  Advanced spondylosis C5-C7, milder at C4-5, with Right radiculopathy.  Lumbar spine:  1.  6 yrs s/p ALIF-PPS L5-S1 (11/15/18) with solid arthrodesis.  Other:  1.  Chronic active smoking (~ 1 ppd).    Plan:    Had good long discussion with patient.  First of all, I congratulated him on his successful outcome from lumbar spine surgery that we did 6 years ago.  He said this lately has been getting worse again, but not as bad as his neck.  Regarding his chronic neck pain that radiates to his right arm, imaging studies show degenerative changes (spondylosis), with disc space narrowing and spinal canal narrowing (stenosis).  He had 2 previous cervical thoracic epidural injections, with inconsistent results.  At this point, I recommend exhausting nonoperative treatment, mainly in the form of physical therapy.  Explained to him that this is also an insurance requirement prior to approving any form of surgery.  If pain persists, may then consider 2 or 3 level fusion versus arthroplasty.  Given the advanced spondylosis at those levels, uncertain whether arthroplasty would be able to restore motion.  Furthermore, if he is also getting pain from facet arthropathy, fusion may be better able to alleviate his symptoms.  On the other hand, given  his smoking history, is also at higher risk for pseudarthrosis with fusion.  Regarding 2 versus 3 levels, I told him that fusion rates are much lower with two-level fusion versus 3.  In terms of arthroplasty, I do not think that insurance would approve of three-level arthroplasty, as FDA approval is only up to 2 levels.    - PT order placed.  - Right C7 nerve root block with steroid.  May be performed down in [place] c/o Dr. Nevarez.  - Updated cervical CT scan; his last CT scan was almost 2 years ago (11/20/2022).    Virtual return to clinic after above, to review response, and discuss further options as needed.    45 minutes spent on the date of the encounter doing chart review/review of outside records/review of test results/interpretation of tests/patient visit/documentation/discussion with other provider(s)/discussion with patient and family.    Charlie Nixon MD    Orthopaedic Spine Surgery  Dept Orthopaedic Surgery, [facility]  [phone] office, [phone] pager  [email or web address]

## 2024-11-18 ENCOUNTER — ANCILLARY PROCEDURE (OUTPATIENT)
Dept: INTERVENTIONAL RADIOLOGY/VASCULAR | Facility: CLINIC | Age: 44
End: 2024-11-18
Attending: ORTHOPAEDIC SURGERY
Payer: COMMERCIAL

## 2024-11-18 ENCOUNTER — ANCILLARY PROCEDURE (OUTPATIENT)
Dept: CT IMAGING | Facility: CLINIC | Age: 44
End: 2024-11-18
Attending: ORTHOPAEDIC SURGERY
Payer: COMMERCIAL

## 2024-11-18 VITALS
DIASTOLIC BLOOD PRESSURE: 77 MMHG | OXYGEN SATURATION: 99 % | RESPIRATION RATE: 20 BRPM | SYSTOLIC BLOOD PRESSURE: 126 MMHG | HEART RATE: 56 BPM | TEMPERATURE: 98 F

## 2024-11-18 DIAGNOSIS — M47.22 CERVICAL SPONDYLOSIS WITH RADICULOPATHY: ICD-10-CM

## 2024-11-18 DIAGNOSIS — M54.12 CERVICAL RADICULOPATHY: ICD-10-CM

## 2024-11-18 PROCEDURE — 64479 NJX AA&/STRD TFRM EPI C/T 1: CPT | Mod: RT | Performed by: RADIOLOGY

## 2024-11-18 RX ORDER — DEXAMETHASONE SODIUM PHOSPHATE 10 MG/ML
10 INJECTION, SOLUTION INTRAMUSCULAR; INTRAVENOUS ONCE
Status: COMPLETED | OUTPATIENT
Start: 2024-11-18 | End: 2024-11-18

## 2024-11-18 RX ORDER — IOPAMIDOL 408 MG/ML
10 INJECTION, SOLUTION INTRATHECAL ONCE
Status: COMPLETED | OUTPATIENT
Start: 2024-11-18 | End: 2024-11-18

## 2024-11-18 RX ADMIN — DEXAMETHASONE SODIUM PHOSPHATE 10 MG: 10 INJECTION, SOLUTION INTRAMUSCULAR; INTRAVENOUS at 11:26

## 2024-11-18 RX ADMIN — IOPAMIDOL 2 ML: 408 INJECTION, SOLUTION INTRATHECAL at 11:26

## 2024-11-18 NOTE — DISCHARGE INSTRUCTIONS
Date: 11/18/24  Provider: Dr. MAVIS Tai, neuroradiology department  Location: [facility], Imaging Department, [place]    Home Care Instructions after your Steroid Injection:  If you have new numbness down your arms after the injection, this may last up to 4-6 hours, but is expected go away.   Over the next 24 to 48 hours, pain at the injection site may increase before it gets better.  For the next 48 hours, use ice packs for 15 minutes, 3-4 times a day for pain relief.  If you have a bandage, remove it tomorrow morning.     Do not submerge injection site in water for 24 hours (no baths. pools, hot tubs).  Showers are OK.            Relax today. Return to your regular activity tomorrow.  Do not drive for 24 hours.         Medications  Restart blood thinning/anticoagulant medication tomorrow at your regular dose.  If you are restarting warfarin, discuss a follow up plan/labs with you anticoagulation clinic.  Continue to take all other medications as ordered by your provider.    Common side effects may include:  Increased blood sugar.  (If you are diabetic, watch your blood sugar closely. Call your doctor to help control your blood sugar)   Insomnia  Heartburn  Flushed face  Water retention  Increased appetite    Important Information:  The steroid should help reduce swelling and pain. This may take from 3-14 days.   Follow up with the provider that ordered this injection in 3-4 weeks. Let them know if the injection was effective.  No vaccines in the next 14 days. (Ex. COVID or FLU VACCINE)    Call your doctor or go to the Emergency Room for evaluation:  If you have NEW severe pain.  Fever greater than 100.5 degrees.  Loss of control of your arms

## 2024-11-20 ENCOUNTER — TELEPHONE (OUTPATIENT)
Dept: ORTHOPEDICS | Facility: CLINIC | Age: 44
End: 2024-11-20
Payer: COMMERCIAL

## 2024-11-20 NOTE — TELEPHONE ENCOUNTER
Other: Patient is returning call to Дмитрий regarding his physical therapy. Patient has had 8-9 PT sessions with [facility]. Patient is also wondering if patient's therapy records have been received by clinic. Please call patient when able.       Could we send this information to you in [employer] or would you prefer to receive a phone call?:   Patient would prefer a phone call   Okay to leave a detailed message?: Yes at Home number on file [phone] (home)

## 2024-11-20 NOTE — TELEPHONE ENCOUNTER
Called Pt, he is doing PT at [facility] no progress notes in chart. Gave Pt ortho fax number and he is going to have [facility] fax progress note.  Дмитрий MARTINEZ

## 2024-11-26 ENCOUNTER — VIRTUAL VISIT (OUTPATIENT)
Dept: ORTHOPEDICS | Facility: CLINIC | Age: 44
End: 2024-11-26
Payer: COMMERCIAL

## 2024-11-26 VITALS — WEIGHT: 155 LBS | BODY MASS INDEX: 23.49 KG/M2 | HEIGHT: 68 IN

## 2024-11-26 DIAGNOSIS — M47.22 CERVICAL SPONDYLOSIS WITH RADICULOPATHY: Primary | ICD-10-CM

## 2024-11-26 PROCEDURE — 99214 OFFICE O/P EST MOD 30 MIN: CPT | Mod: 95 | Performed by: ORTHOPAEDIC SURGERY

## 2024-11-26 ASSESSMENT — PAIN SCALES - GENERAL: PAINLEVEL_OUTOF10: MILD PAIN (3)

## 2024-11-26 NOTE — PROGRESS NOTES
"VIRTUAL VISIT:  Patient is evaluated today via billable virtual visit.    The patient has been notified of following:   \"This virtual visit will be conducted via a call between you and your physician/provider. We have found that certain health care needs can be provided without the need for an in-person physical exam.  This service lets us provide the care you need with a virtual conversation.  If a prescription is necessary we can send it directly to your pharmacy.  If lab work is needed we can place an order for that and you can then stop by our lab to have the test done at a later time.  If during the course of the call the physician/provider feels a virtual visit is not appropriate, you will not be charged for this service.\"     Spine Surgical Hx:  11/15/2018 - ALIF-PPS L5-S1; use of Infuse BMP Small kit; MIS R hemilaminectomy-diskectomy L5-S1 (Tiffani/Wilber) for DDD with R posterolateral HNP.  [Implants: NuVasive Base titanium cage; Medtronic Voyager perc screw system; Medtronic METRx MIS system].      Physician has received verbal consent for a Virtual Visit from the patient.  Platform used:  [employer] Video  Time:  1:20pm to 1:42pm  Originating Location (pt. Location): Home  Distant Location (provider location):  [facility] [place]     Chief Complaint   Patient presents with    RECHECK       Last Visit Date: 10/1/24  Previous Impression:  43/m RHD with:  Cervical spine:  1.  Chronic axial neck pain with R arm radicular pain.  2.  Advanced spondylosis C5-C7, milder at C4-5, with Right radiculopathy.  Lumbar spine:  1.  6 yrs s/p ALIF-PPS L5-S1 (11/15/18) with solid arthrodesis.  Other:  1.  Chronic active smoking (~ 1 ppd).  Previous Plan:  - PT order placed.  - Right C7 nerve root block with steroid.  May be performed down in [place] c/o Dr. Nevarez.  - Updated cervical CT scan; his last CT scan was almost 2 years ago (11/20/2022).  Virtual return to clinic after above, to " review response, and discuss further options as needed.      S>  44 year old male, RHD, here for recheck.    11/18/24 - Left C7 SNRB with steroid (Dr. Tai).  Per report, pain completely relieved, from 2/10 to 0.  Per patient, was sore for the first couple of days then got much better.  Now much better than before.  If he moves his head certain ways, he could reproduce the pain, but not as bad as before; not constant anymore.  Overall 60-70% better.  Currently still helping.    Doing PT at [facility] in [place], [place] (at the [facility] [employer]); had completed 8-9 sessions.  Scheduled to go on until Dec 11.  Per patient, he previously signed a consent form allowing us to retrieve PT records.    From previous note:  Previous treatment:   No formal PT.  Injection (see below).  Meds: Tylenol and Alleve prn.  Tried gabapentin, but had adverse effects ('was messing with my head'), so stopped.    Previous Injections:  2/19/24 - C7-T1 IL LEELA (Dr. Hair, [facility]).  Per patient, got some relief.  8/21/24 - Rpt C7-T1 IL LEELA (Dr. Hair, [place]).  Per patient, did not help and even seemed to make things worse.     PSHx:  , lives with wife.  Still going to work, but getting tougher and tougher.  Still smokes 1 ppd.  Per patient, he quit smoking for a few months when we did fusion surgery in 2018.     Per patient, the PCP listed in his chart (Dr. Josue) is now retired.  He does not yet hav a new one.      Oswestry (LAURYN) Questionnaire        11/26/2024    11:08 AM   OSWESTRY DISABILITY INDEX   Count 10    Sum 16    Oswestry Score (%) 32 %        Patient-reported      S/p ALIF-PPS L5-S1 (11/15/18):  Preop ODI44%  6wks                  40%  3mo                   5%  6mo                   6.67%  1yr                     27.5%  6yr                     28%      Neck Disability Index (NDI) Questionnaire        11/26/2024    11:21 AM   Neck Disability Index (NDI)   Neck Disability Index: Count 9   NDI: Total Score = SUM  (points for all 10 findings) Incomplete   Neck Disability in Percent = (Total Score) / 50 * 100 Incomplete      Neck Disability Index (NDI) Questionnaire        11/26/2024    11:21 AM   Neck Disability Index   Count 9    Sum 14    Raw Score: /50 15.56        Patient-reported      NDI 10/1/24  34%   NDI 11/26/24 31.12%    PROMIS-10 Scores  Global Mental Health Score: 8  Global Physical Health Score: 12  PROMIS TOTAL - SUBSCORES: 20    Physical Examination:    This was a virtual visit, so very limited exam could be performed.  Patient seemed alert, oriented x 3, cooperative, with coherent speech, and not in distress.  Able to respond to questions appropriately and follow instructions.    Imaging:    Cervical CT from 11/18/2024 reviewed.  Shows advanced spondylosis C5-6 and C6-7, milder at other levels.  Foraminal stenosis secondary to disc osteophyte complex C5-6 and C6-7.    Also reviewed images from cervical spine injection performed 11/18/2024.    Assessment:    44/m RHD with:  Cervical spine:  1.  Chronic axial neck pain with R arm radicular pain.  2.  Advanced spondylosis C5-C7, milder at C4-5.  3.  Right C7 radiculopathy, with positive response to Right C7 SNRB with steroid (11/18/24 Dr. Tai).  Lumbar spine:  1.  6 yrs s/p ALIF-PPS L5-S1 (11/15/18) with solid arthrodesis.  Other:  1.  Chronic active smoking (~ 1 ppd); still smoking as of 11/26/24.    Plan:    Had good long discussion with patient regarding his condition.  His positive response to the right C7 nerve root block would strongly suggest that the C7 nerve root is mediating a significant amount of his pain, confirming our clinical suspicion.  He has advanced spondylosis at C5-6 and C6-7, milder at C4-5.  We had previously discussed the jump and nonunion rates between 2 and 3 levels of fusion.  As such, I recommend two-level ACDF C5-C7.  Given that his pain is more neck pain than arm pain, I believe a fusion would have a slightly better chance at  improving his neck pain compared to arthroplasty.  Given his history of chronic smoking, I recommend use of iliac crest bone graft to maximize healing potential.  Furthermore, he would need to be off smoking, with negative nicotine test, as likely would be required by insurance, prior to surgery.  We discussed rationale for above surgical recommendation, risks, benefits and alternatives.  We discussed anticipated postoperative recovery and restrictions.  Patient elects to proceed.    - Retrieve PT notes from [facility] in [place], [place] ([facility]); had completed 8-9 sessions.  - Case request: 2-level ACDF C5-C7; R or L ant ICBG harvest.  - PAC referral.  - Need to quit smoking prior to surgery, with negative nicotine test.    The patient does not have any untreated, underlying mental health conditions or issues which are a major contributor to their chronic pain.    25 minutes spent on the date of the encounter doing chart review/review of outside records/review of test results/interpretation of tests/patient visit/documentation/discussion with other provider(s)/discussion with patient and family.      Charlie Nixon MD    Orthopaedic Spine Surgery  Dept Orthopaedic Surgery, [facility]  [phone] office, [phone] pager  [email or web address]

## 2024-11-26 NOTE — NURSING NOTE
Current patient location: [address] [place] [id number]    Is the patient currently in the state of [place]? YES    Visit mode:VIDEO    If the visit is dropped, the patient can be reconnected by:VIDEO VISIT: Text to cell phone:   Telephone Information:   Mobile [phone]       Will anyone else be joining the visit? Pts spouse  (If patient encounters technical issues they should call [phone])    Are changes needed to the allergy or medication list? No    Patients spouse denies any changes since echeck-in completion and states all information entered during echeck-in remains accurate.    Are refills needed on medications prescribed by this physician? NO    Rooming Documentation:  Questionnaire(s) completed , PHQ declined     Pts most recent vitals were checked last week at injection per pts spouse     Reason for visit: RECHECK    Meli Thorpe MA VVF

## 2024-11-26 NOTE — LETTER
"11/26/2024      Lee Mckenzie  [address] [facility] [address]      Dear Colleague,    Thank you for referring your patient, Lee Mckenzie, to the [facility]. Please see a copy of my visit note below.    VIRTUAL VISIT:  Patient is evaluated today via billable virtual visit.    The patient has been notified of following:   \"This virtual visit will be conducted via a call between you and your physician/provider. We have found that certain health care needs can be provided without the need for an in-person physical exam.  This service lets us provide the care you need with a virtual conversation.  If a prescription is necessary we can send it directly to your pharmacy.  If lab work is needed we can place an order for that and you can then stop by our lab to have the test done at a later time.  If during the course of the call the physician/provider feels a virtual visit is not appropriate, you will not be charged for this service.\"     Spine Surgical Hx:  11/15/2018 - ALIF-PPS L5-S1; use of Infuse BMP Small kit; MIS R hemilaminectomy-diskectomy L5-S1 (Quanbrano/Wilber) for DDD with R posterolateral HNP.  [Implants: NuVasive Base titanium cage; Medtronic Voyager perc screw system; Medtronic METRx MIS system].      Physician has received verbal consent for a Virtual Visit from the patient.  Platform used:  Shareaholic Video  Time:  1:20pm to 1:42pm  Originating Location (pt. Location): Home  Distant Location (provider location):  [facility]     Chief Complaint   Patient presents with     RECHECK       Last Visit Date: 10/1/24  Previous Impression:  43/m RHD with:  Cervical spine:  1.  Chronic axial neck pain with R arm radicular pain.  2.  Advanced spondylosis C5-C7, milder at C4-5, with Right radiculopathy.  Lumbar spine:  1.  6 yrs s/p ALIF-PPS L5-S1 (11/15/18) with solid arthrodesis.  Other:  1.  Chronic active smoking (~ 1 " ppd).  Previous Plan:  - PT order placed.  - Right C7 nerve root block with steroid.  May be performed down in [place] c/o Dr. Hiar.  - Updated cervical CT scan; his last CT scan was almost 2 years ago (11/20/2022).  Virtual return to clinic after above, to review response, and discuss further options as needed.      S>  44 year old male, RHD, here for recheck.    11/18/24 - Left C7 SNRB.  Per patient, was sore for the first couple of days then got much better.  Now much better than before.  If he moves his head certain ways, he could reproduce the pain, but not as bad as before; not constant anymore.  Overall 60-70% better.  Currently still helping.    Doing PT at [facility] in [place], [place] (at the athletic club); had completed 8-9 sessions.  Scheduled to go on until Dec 11.  Per patient, he previously signed a consent form allowing us to retrieve PT records.    From previous note:  Previous treatment:   No formal PT.  Injection (see below).  Meds: Tylenol and Alleve prn.  Tried gabapentin, but had adverse effects ('was messing with my head'), so stopped.    Previous Injections:  2/19/24 - C7-T1 IL LEELA (Dr. Hair, [facility]).  Per patient, got some relief.  8/21/24 - Rpt C7-T1 IL LEELA (Dr. Hair, [place]).  Per patient, did not help and even seemed to make things worse.     PSHx:  , lives with wife.  Still going to work, but getting tougher and tougher.  Still smokes 1 ppd.  Per patient, he quit smoking for a few months when we did fusion surgery in 2018.     Per patient, the PCP listed in his chart (Dr. Josue) is now retired.  He does not yet hav a new one.      Oswestry (LAURYN) Questionnaire        11/26/2024    11:08 AM   OSWESTRY DISABILITY INDEX   Count 10    Sum 16    Oswestry Score (%) 32 %        Patient-reported      S/p ALIF-PPS L5-S1 (11/15/18):  Preop ODI44%  6wks                  40%  3mo                   5%  6mo                   6.67%  1yr                     27.5%  6yr                      28%      Neck Disability Index (NDI) Questionnaire        11/26/2024    11:21 AM   Neck Disability Index (NDI)   Neck Disability Index: Count 9   NDI: Total Score = SUM (points for all 10 findings) Incomplete   Neck Disability in Percent = (Total Score) / 50 * 100 Incomplete      Neck Disability Index (NDI) Questionnaire        11/26/2024    11:21 AM   Neck Disability Index   Count 9    Sum 14    Raw Score: /50 15.56        Patient-reported      NDI 10/1/24  34%   NDI 11/26/24 31.12%    PROMIS-10 Scores  Global Mental Health Score: 8  Global Physical Health Score: 12  PROMIS TOTAL - SUBSCORES: 20    Physical Examination:    This was a virtual visit, so very limited exam could be performed.  Patient seemed alert, oriented x 3, cooperative, with coherent speech, and not in distress.  Able to respond to questions appropriately and follow instructions.    Imaging:    Cervical CT from 11/18/2024 reviewed.  Shows advanced spondylosis C5-6 and C6-7, milder at other levels.  Foraminal stenosis secondary to disc osteophyte complex C5-6 and C6-7.    Also reviewed images from cervical spine injection performed 11/18/2024.    Assessment:    44/m RHD with:  Cervical spine:  1.  Chronic axial neck pain with R arm radicular pain.  2.  Advanced spondylosis C5-C7, milder at C4-5.  3.  Right C7 radiculopathy, with positive response to Right C7 SNRB with steroid (11/18/24 Dr. Tai).  Lumbar spine:  1.  6 yrs s/p ALIF-PPS L5-S1 (11/15/18) with solid arthrodesis.  Other:  1.  Chronic active smoking (~ 1 ppd); still smoking as of 11/26/24.    Plan:    Had good long discussion with patient regarding his condition.  His positive response to the right C7 nerve root block would strongly suggest that the C7 nerve root is mediating a significant amount of his pain, confirming our clinical suspicion.  He has advanced spondylosis at C5-6 and C6-7, milder at C4-5.  We had previously discussed the jump and nonunion rates between  2 and 3 levels of fusion.  As such, I recommend two-level ACDF C5-C7.  Given that his pain is more neck pain than arm pain, I believe a fusion would have a slightly better chance at improving his neck pain compared to arthroplasty.  Given his history of chronic smoking, I recommend use of iliac crest bone graft to maximize healing potential.  Furthermore, he would need to be off smoking, with negative nicotine test, as likely would be required by insurance, prior to surgery.  We discussed rationale for above surgical recommendation, risks, benefits and alternatives.  We discussed anticipated postoperative recovery and restrictions.  Patient elects to proceed.    - Retrieve PT notes from [facility] in [place], [place] (at the athletic club); had completed 8-9 sessions.  - Case request: 2-level ACDF C5-C7; R or L ant ICBG harvest.  - PAC referral.  - Need to quit smoking prior to surgery, with negative nicotine test.    The patient does not have any untreated, underlying mental health conditions or issues which are a major contributor to their chronic pain.    25 minutes spent on the date of the encounter doing chart review/review of outside records/review of test results/interpretation of tests/patient visit/documentation/discussion with other provider(s)/discussion with patient and family.      Charlie Nixon MD    Orthopaedic Spine Surgery  Dept Orthopaedic Surgery, [facility]  [phone] office, [phone] pager  [email or web address]      Again, thank you for allowing me to participate in the care of your patient.        Sincerely,        Charlie Nixon MD

## 2024-11-27 ENCOUNTER — TELEPHONE (OUTPATIENT)
Dept: ORTHOPEDICS | Facility: CLINIC | Age: 44
End: 2024-11-27
Payer: COMMERCIAL

## 2024-11-27 NOTE — PROGRESS NOTES
Teaching Flowsheet   Relevant Diagnosis: spine  Teaching Topic: preop     Person(s) involved in teaching:   Patient     Motivation Level:  Asks Questions: Yes  Eager to Learn: Yes  Cooperative: Yes  Receptive (willing/able to accept information): Yes  Any cultural factors/Mandaeism beliefs that may influence understanding or compliance? No       Patient demonstrates understanding of the following:  Reason for the appointment, diagnosis and treatment plan: Yes  Knowledge of proper use of medications and conditions for which they are ordered (with special attention to potential side effects or drug interactions): Yes  Which situations necessitate calling provider and whom to contact: Yes       Teaching Concerns Addressed:        Proper use and care of meds (medical equip, care aids, etc.): Yes  Nutritional needs and diet plan: Yes  Pain management techniques: Yes  Wound Care: Yes  How and/when to access community resources: Yes     Instructional Materials Used/Given: preop pkt     Time spent with patient: 15 minutes.

## 2024-11-27 NOTE — TELEPHONE ENCOUNTER
Patient is scheduled for surgery with Dr. Nixon     Spoke with: Lee     Date of Surgery: 2/19/2025    Location: [facility] OR    Informed patient they will need an adult  Yes    Pre op with Provider PAC, 1/23/25 at 12PM    Additional imaging/appointments: PO Made    Surgery packet: Mailed     Additional comments: N/A        Dorcas Matson on 11/27/2024 at 12:23 PM

## 2024-12-11 ENCOUNTER — TRANSFERRED RECORDS (OUTPATIENT)
Dept: HEALTH INFORMATION MANAGEMENT | Facility: CLINIC | Age: 44
End: 2024-12-11
Payer: COMMERCIAL

## 2025-01-22 LAB
ABO + RH BLD: NORMAL
BLD GP AB SCN SERPL QL: NEGATIVE
SPECIMEN EXP DATE BLD: NORMAL

## 2025-01-23 ENCOUNTER — OFFICE VISIT (OUTPATIENT)
Dept: SURGERY | Facility: CLINIC | Age: 45
End: 2025-01-23
Payer: COMMERCIAL

## 2025-01-23 ENCOUNTER — APPOINTMENT (OUTPATIENT)
Dept: LAB | Facility: CLINIC | Age: 45
End: 2025-01-23
Payer: COMMERCIAL

## 2025-01-23 ENCOUNTER — ANESTHESIA EVENT (OUTPATIENT)
Dept: SURGERY | Facility: CLINIC | Age: 45
End: 2025-01-23
Payer: COMMERCIAL

## 2025-01-23 ENCOUNTER — LAB (OUTPATIENT)
Dept: LAB | Facility: CLINIC | Age: 45
End: 2025-01-23
Payer: COMMERCIAL

## 2025-01-23 VITALS
HEART RATE: 57 BPM | RESPIRATION RATE: 16 BRPM | TEMPERATURE: 98 F | SYSTOLIC BLOOD PRESSURE: 119 MMHG | BODY MASS INDEX: 23.4 KG/M2 | HEIGHT: 69 IN | WEIGHT: 158 LBS | DIASTOLIC BLOOD PRESSURE: 74 MMHG | OXYGEN SATURATION: 97 %

## 2025-01-23 DIAGNOSIS — F17.200 NICOTINE DEPENDENCE: Primary | ICD-10-CM

## 2025-01-23 DIAGNOSIS — Z01.818 PREOP EXAMINATION: Primary | ICD-10-CM

## 2025-01-23 DIAGNOSIS — Z01.818 PREOP EXAMINATION: ICD-10-CM

## 2025-01-23 DIAGNOSIS — M47.22 CERVICAL SPONDYLOSIS WITH RADICULOPATHY: ICD-10-CM

## 2025-01-23 LAB
ANION GAP SERPL CALCULATED.3IONS-SCNC: 10 MMOL/L (ref 7–15)
BUN SERPL-MCNC: 14.4 MG/DL (ref 6–20)
CALCIUM SERPL-MCNC: 9.3 MG/DL (ref 8.8–10.4)
CHLORIDE SERPL-SCNC: 105 MMOL/L (ref 98–107)
CREAT SERPL-MCNC: 0.85 MG/DL (ref 0.67–1.17)
EGFRCR SERPLBLD CKD-EPI 2021: >90 ML/MIN/1.73M2
ERYTHROCYTE [DISTWIDTH] IN BLOOD BY AUTOMATED COUNT: 12.1 % (ref 10–15)
GLUCOSE SERPL-MCNC: 85 MG/DL (ref 70–99)
HCO3 SERPL-SCNC: 25 MMOL/L (ref 22–29)
HCT VFR BLD AUTO: 40.7 % (ref 40–53)
HGB BLD-MCNC: 13.8 G/DL (ref 13.3–17.7)
MCH RBC QN AUTO: 30.1 PG (ref 26.5–33)
MCHC RBC AUTO-ENTMCNC: 33.9 G/DL (ref 31.5–36.5)
MCV RBC AUTO: 89 FL (ref 78–100)
PLATELET # BLD AUTO: 254 10E3/UL (ref 150–450)
POTASSIUM SERPL-SCNC: 4.2 MMOL/L (ref 3.4–5.3)
RBC # BLD AUTO: 4.59 10E6/UL (ref 4.4–5.9)
SODIUM SERPL-SCNC: 140 MMOL/L (ref 135–145)
WBC # BLD AUTO: 10.1 10E3/UL (ref 4–11)

## 2025-01-23 PROCEDURE — 85027 COMPLETE CBC AUTOMATED: CPT | Performed by: PATHOLOGY

## 2025-01-23 PROCEDURE — 99000 SPECIMEN HANDLING OFFICE-LAB: CPT | Performed by: PATHOLOGY

## 2025-01-23 PROCEDURE — G0480 DRUG TEST DEF 1-7 CLASSES: HCPCS | Mod: 90 | Performed by: PATHOLOGY

## 2025-01-23 PROCEDURE — 80048 BASIC METABOLIC PNL TOTAL CA: CPT | Performed by: PATHOLOGY

## 2025-01-23 PROCEDURE — 36415 COLL VENOUS BLD VENIPUNCTURE: CPT | Performed by: PATHOLOGY

## 2025-01-23 ASSESSMENT — LIFESTYLE VARIABLES: TOBACCO_USE: 1

## 2025-01-23 ASSESSMENT — PAIN SCALES - GENERAL: PAINLEVEL_OUTOF10: MILD PAIN (3)

## 2025-01-23 NOTE — PATIENT INSTRUCTIONS
Preparing for Your Surgery      Name:  Lee Mckenzie   MRN:  [id number]   :  1980   Today's Date:  2025       Arriving for surgery:  Surgery date:  25  Arrival time:  5.30AM    Please come to:     Please come to:       [facility] [address]   [address] [place]  [address]     The Green Ramp for patients and visitors is beneath the [facility]. The parking facility entrance is at the intersection of [address] [place] [address] and [address]. Patients and visitors who self-park will receive the reduced hospital parking rate (no ticket validation needed).     [email or web address] parking, located at the [facility] main entrance on [address] [place] [address], is available Monday - Friday from 7 am to 3:30 pm.     Discounted parking pass options can be purchased from  attendants during business hours.     -Check in at the security desk in the [facility] ([facility] [place])   Lobby. They will direct you to the correct elevators.   -Proceed to the 3rd floor, Adult Surgery Waiting Lounge. [phone]     If you need directions, a wheelchair or escort please stop at the Information Desk in the lobby.  Inform the information person you are here for surgery; a wheelchair and escort to [address] will be provided.   An escort to the Adult Surgery Waiting Lounge will be provided. .    What can I eat or drink?  -  You may eat and drink normally up to 8 hours prior to arrival time. (Until 9.30PM)  -  You may have clear liquids until 2 hours prior to arrival time. (Until 3.30AM)    Examples of clear liquids:  Water  Clear broth  Juices (apple, white grape, white cranberry  and cider) without pulp  Noncarbonated, powder based beverages  (lemonade and Radames-Aid)  Sodas (Sprite, 7-Up, ginger ale and seltzer)  Coffee or tea (without milk or cream)  Gatorade    -  No Alcohol or cannabis products for at  least 24 hours before surgery.     Which medicines can I take?    Hold Naproxen (Aleve) for 4 days before surgery.    -  DO NOT take these medications the day of surgery:  Continue to hold Naproxen    -  PLEASE TAKE these medications the day of surgery:  Tylenol as needed  Albuterol inhaler as needed. Please bring it with you on the day of surgery.    How do I prepare myself?  - Please take 2 showers (one the night prior to surgery and one the morning of surgery) using Scrubcare or Hibiclens soap.    Use this soap only from the neck to your toes. Avoid genital area      Leave the soap on your skin for one minute--then rinse thoroughly.      You may use your own shampoo and conditioner. No other hair products.   - Please remove all jewelry and body piercings.  - No lotions, deodorants or fragrance.  - No makeup or fingernail polish.   - Bring your ID and insurance card.    -If you use a CPAP machine, please bring the CPAP machine, tubing, and mask to hospital.    -If you have a Deep Brain Stimulator, Spinal Cord Stimulator, or any Neuro Stimulator device---you must bring the remote control to the hospital.      ALL PATIENTS GOING HOME THE SAME DAY OF SURGERY ARE REQUIRED TO HAVE A RESPONSIBLE ADULT TO DRIVE AND BE IN ATTENDANCE WITH THEM FOR 24 HOURS FOLLOWING SURGERY.    Covid testing policy as of 12/06/2022  Your surgeon will notify and schedule you for a COVID test if one is needed before surgery--please direct any questions or COVID symptoms to your surgeon      Questions or Concerns:    - For any questions regarding the day of surgery or your hospital stay, please contact the Pre Admission Nursing Office at [phone].       - If you have health changes between today and your surgery, please call your surgeon.       - For questions after surgery, please call your surgeons office.           Current Visitor Guidelines    You may have 2 visitors in the pre op area.    Visiting hours: 8 a.m. to 8:30  p.m.    Patients confirmed or suspected to have symptoms of COVID 19 or flu:     No visitors allowed for adult patients.   Children (under age 18) can have 1 named visitor.     People who are sick or showing symptoms of COVID 19 or flu:    Are not allowed to visit patients--we can only make exceptions in special situations.       Please follow these guidelines for your visit:          Please maintain social distance          Masking is optional--however at times you may be asked to wear a mask for the safety of yourself and others     Clean your hands with alcohol hand . Do this when you arrive at and leave the building and patient room,    And again after you touch your mask or anything in the room.     Go directly to and from the room you are visiting.     Stay in the patient s room during your visit. Limit going to other places in the hospital as much as possible     Leave bags and jackets at home or in the car.     For everyone s health, please don t come and go during your visit. That includes for smoking   during your visit.

## 2025-01-23 NOTE — RESULT ENCOUNTER NOTE
Fly Howard,    Your test results are attached. The blood tests I ordered are normal and good for surgery.  Your nicotine test is still in process and that will come back to your MyChart from Dr. Nixon.         Concetta Gu DNP, RN, ANP-C

## 2025-01-23 NOTE — PROVIDER NOTIFICATION
01/23/25 1235   Discharge Planning   Patient/Family Anticipates Transition to home with family   Concerns to be Addressed all concerns addressed in this encounter   Living Arrangements   People in Home spouse   Type of Residence Private Residence   Is your private residence a single family home or apartment? Single family home   Number of Stairs, Within Home, Primary greater than 10 stairs   Stair Railings, Within Home, Primary railing on right side (ascending);railings safe and in good condition   Once home, are you able to live on one level? Yes   Which level? Main Level   Bathroom Shower/Tub Tub/Shower unit   Equipment Currently Used at Home none   Support System   Support Systems Spouse/Significant Other;Family Members;Parent   Do you have someone available to stay with you one or two nights once you are home? Yes   Relationship/Environment   Name(s) of People in Home Jessie jha

## 2025-01-23 NOTE — H&P
Pre-Operative H & P     CC:  Preoperative exam to assess for increased cardiopulmonary risk while undergoing surgery and anesthesia.    Date of Encounter: 1/23/2025  Primary Care Physician:  No Ref-Primary, Physician     Reason for visit:   Encounter Diagnoses   Name Primary?    Preop examination Yes    Cervical spondylosis with radiculopathy        HPI  Lee Mckenzie is a 44 year old male who presents for pre-operative H & P in preparation for  Procedure Information       Case: [id number] Date/Time: 02/19/25 0730    Procedures:       Anterior Cervical Diskectomy and Fusion with Anterior Plate Fixation Cervical 5-7 (2 Levels); (Spine)      Right or Left anterior Iliac Crest Bone Graft [place] (Hip)    Anesthesia type: General    Diagnosis: Cervical spondylosis with radiculopathy [M47.22]    Pre-op diagnosis: Cervical spondylosis with radiculopathy [M47.22]    Location:  OR 02 / [facility]    Providers: Charlie Nixon MD            Lee Mckenzie is a 44 year old male with anemia, tobacco use and anxiety that has cervical spondylosis with radiculopathy.   Early last year he started having neck and BUE symptoms that he thought were possibly related to the type of work he was doing.  He has tried physical therapy and injections, but continues to have neck pain, RUE pain, bilateral hand paresthesias and numbness, and difficult with grasp and fine motor skills.  He has consulted with Dr. Nixon and the above listed procedure was recommended for treatment.     History is obtained from the patient and chart review    Hx of abnormal bleeding or anti-platelet use: none      Past Medical History  Past Medical History:   Diagnosis Date    Disc degeneration, lumbar     Osteoarthritis     Tobacco abuse        Past Surgical History  Past Surgical History:   Procedure Laterality Date    FUSION LUMBAR ANTERIOR ONE LEVEL N/A 11/05/2018    Procedure: Part 1:  (supine) Anterior Lumbar Interbody Fusion Lumbar 5-Sacral 1;  Use Of rhBMP-2   ;  Surgeon: Charlie Nixon MD;  [facility]    MANDIBLE FRACTURE SURGERY      OTS FUSION SPINE POSTERIOR LUMBAR MINIMALLY INVASIVE ONE LEVEL N/A 11/05/2018    Procedure: Part 2:  (prone) Stealth Guided Pedicle Screw Fixation Lumbar 5 -Sacral 1; Right Hemilaminectomy-Discectomy Lumbar 5-Sacral 1;  Surgeon: Charlie Nixon MD;  [facility]    RELEASE TRIGGER FINGER Left        Prior to Admission Medications  Current Outpatient Medications   Medication Sig Dispense Refill    acetaminophen (TYLENOL) 325 MG tablet Take 3 tablets (975 mg) by mouth every 8 hours 100 tablet 1    albuterol (PROAIR HFA/PROVENTIL HFA/VENTOLIN HFA) 108 (90 Base) MCG/ACT inhaler Inhale 2 puffs into the lungs every 4 hours as needed for shortness of breath.      Naproxen Sodium (ALEVE PO) Take 220-440 mg by mouth as needed          Allergies  Allergies   Allergen Reactions    Morphine Other (See Comments)     Sensitivity       Social History  Social History     Socioeconomic History    Marital status:      Spouse name: Jessie    Number of children: 0    Years of education: Not on file    Highest education level: Not on file   Occupational History    Occupation:    Tobacco Use    Smoking status: Former     Types: Cigarettes    Smokeless tobacco: Never    Tobacco comments:     Quit middle of December 2024   Vaping Use    Vaping status: Never Used   Substance and Sexual Activity    Alcohol use: No    Drug use: Yes     Types: Marijuana     Comment: daily    Sexual activity: Not on file   Other Topics Concern    Not on file   Social History Narrative    Lives in own home.  Takes care of yard.  Likes to stay active despite back pain.  Enjoys fishing.      Social Drivers of Health     Financial Resource Strain: Not on file   Food Insecurity: Not on file   Transportation Needs: Not on file   Physical Activity: Not on file   Stress: Not on file   Social Connections: Not on file   Interpersonal  "Safety: Not on file   Housing Stability: Not on file       Family History  Family History   Problem Relation Age of Onset    Anesthesia Reaction No family hx of     Thrombosis No family hx of        Review of Systems  The complete review of systems is negative other than noted in the HPI or here.   Anesthesia Evaluation   Pt has had prior anesthetic.     No history of anesthetic complications       ROS/MED HX  ENT/Pulmonary: Comment: Quit smoking middle of December, then \"slipped up\" a couple weeks ago, but has since been abstaining.       Has an albuterol inhaler that was prescribed in the past when he had a URI.  No asthma diagnosis.  No recent use of the albuterol.     (+)                tobacco use, Past use,                    (-) asthma and recent URI   Neurologic:  - neg neurologic ROS     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  Denies any exertional dyspnea or angina.    Hematologic:     (+)      anemia,          Musculoskeletal: Comment: Cervical spondylosis with radiculopathy.  Neck pain, RUE pain, bilateral hand paresthesias and numbness.  Worsens with neck extension.      GI/Hepatic:  - neg GI/hepatic ROS     Renal/Genitourinary:  - neg Renal ROS     Endo:  - neg endo ROS     Psychiatric/Substance Use:     (+)     Recreational drug usage: Cannabis.    Infectious Disease:  - neg infectious disease ROS     Malignancy:  - neg malignancy ROS     Other:  - neg other ROS    (+)  , H/O Chronic Pain,         /74 (BP Location: Right arm, Patient Position: Sitting, Cuff Size: Adult Large)   Pulse 57   Temp 98  F (36.7  C) (Oral)   Resp 16   Ht 1.74 m (5' 8.5\")   Wt 71.7 kg (158 lb)   SpO2 97%   BMI " 23.67 kg/m      Physical Exam   Constitutional: Awake, alert, cooperative, no apparent distress, and appears stated age.  Eyes: Pupils equal, round and reactive to light, extra ocular muscles intact, sclera clear, conjunctiva normal.  HENT: Normocephalic, oral pharynx with moist mucus membranes, poor dentition. No goiter appreciated.   Respiratory: Clear to auscultation bilaterally, no crackles or wheezing.  Cardiovascular: Regular rate and rhythm, normal S1 and S2, and no murmur noted.  Carotids +2, no bruits. No edema. Palpable pulses to radial  DP and PT arteries.   GI: Normal bowel sounds, soft, non-distended, non-tender, no masses palpated, no hepatosplenomegaly.    Lymph/Hematologic: No cervical lymphadenopathy and no supraclavicular lymphadenopathy.  Genitourinary:  deferred  Skin: Warm and dry.  No rashes at anticipated surgical site.   Musculoskeletal: Full ROM of neck. There is no redness, warmth, or swelling of the exposed joints. Gross motor strength is normal.    Neurologic: Awake, alert, oriented to name, place and time. Cranial nerves II-XII are grossly intact. Gait is normal.   Neuropsychiatric: Calm, cooperative. Normal affect.     Prior Labs/Diagnostic Studies   All labs and imaging personally reviewed     EKG/ stress test - if available please see in ROS above     The patient's records and results personally reviewed by this provider.     Outside records reviewed from: Care Everywhere    LAB/DIAGNOSTIC STUDIES TODAY:      Component      Latest Ref Rng 1/23/2025  12:46 PM   Sodium      135 - 145 mmol/L 140    Potassium      3.4 - 5.3 mmol/L 4.2    Chloride      98 - 107 mmol/L 105    Carbon Dioxide (CO2)      22 - 29 mmol/L 25    Anion Gap      7 - 15 mmol/L 10    Urea Nitrogen      6.0 - 20.0 mg/dL 14.4    Creatinine      0.67 - 1.17 mg/dL 0.85    GFR Estimate      >60 mL/min/1.73m2 >90    Calcium      8.8 - 10.4 mg/dL 9.3    Glucose      70 - 99 mg/dL 85    WBC      4.0 - 11.0 10e3/uL 10.1    RBC  "Count      4.40 - 5.90 10e6/uL 4.59    Hemoglobin      13.3 - 17.7 g/dL 13.8    Hematocrit      40.0 - 53.0 % 40.7    MCV      78 - 100 fL 89    MCH      26.5 - 33.0 pg 30.1    MCHC      31.5 - 36.5 g/dL 33.9    RDW      10.0 - 15.0 % 12.1    Platelet Count      150 - 450 10e3/uL 254          Assessment    Lee Mckenzie is a 44 year old male seen as a PAC referral for risk assessment and optimization for anesthesia.    Plan/Recommendations  Pt will be optimized for the proposed procedure.  See below for details on the assessment, risk, and preoperative recommendations    NEUROLOGY  - No history of TIA, CVA or seizure    -Post Op delirium risk factors:  No risk identified    ENT  - No current airway concerns.  Will need to be reassessed day of surgery.  Mallampati: I  TM: > 3    CARDIAC  - No history of CAD, Hypertension, and Afib  - METS (Metabolic Equivalents)  Patient performs 4 or more METS exercise without symptoms             Total Score: 0      RCRI-Very low risk: Class 1 0.4% complication rate             Total Score: 0        PULMONARY    KERVIN Low Risk             Total Score: 1    KERVIN: Male      - Denies asthma or inhaler use  - Tobacco History    History   Smoking Status    Former    Types: Cigarettes   Smokeless Tobacco    Never       GI  - denies GERD  PONV Medium Risk  Total Score: 2           1 AN PONV: Patient is not a current smoker    1 AN PONV: Intended Post Op Opioids          ENDOCRINE    - BMI: Estimated body mass index is 23.67 kg/m  as calculated from the following:    Height as of this encounter: 1.74 m (5' 8.5\").    Weight as of this encounter: 71.7 kg (158 lb).  Healthy Weight (BMI 18.5-24.9)  - No history of Diabetes Mellitus    HEME  VTE Low Risk 0.5%             Total Score: 2    VTE: Male      - No history of abnormal bleeding or antiplatelet use.  - Chronic anemia  Recommend perioperative use of blood conservation techniques intraoperatively and close monitoring for postoperative " bleeding.  A type and screen has been ordered for this patient    MSK  Patient IS Frail             Total Score: 3    Frailty: Slower walking speed    Frailty: Increased exhaustion    Frailty: Overall lack of energy      - patient declined a PM&R referral.  Recommend post-op outpatient physical therapy.    -  cervical spondylosis with radiculopathy.  Surgery planned as above.            Different anesthesia methods/types have been discussed with the patient, but they are aware that the final plan will be decided by the assigned anesthesia provider on the date of service.  Patient was discussed with Dr Ndiaye by MD Andrea (resident).     The patient is optimized for their procedure. AVS with information on surgery time/arrival time, meds and NPO status given by nursing staff. No further diagnostic testing indicated.      On the day of service:     Prep time: 8 minutes  Visit time: 21 minutes  Documentation time: 10 minutes  ------------------------------------------  Total time: 39 minutes      ABDI Baker CNP  Preoperative Assessment Center  [facility]  Phone: [phone]  Fax: [phone]

## 2025-01-27 LAB
COTININE SERPL-MCNC: <5 NG/ML
NICOTINE SERPL-MCNC: <5 NG/ML

## 2025-02-18 ASSESSMENT — LIFESTYLE VARIABLES: TOBACCO_USE: 1

## 2025-02-19 ENCOUNTER — ANESTHESIA (OUTPATIENT)
Dept: SURGERY | Facility: CLINIC | Age: 45
End: 2025-02-19
Payer: COMMERCIAL

## 2025-02-19 ENCOUNTER — HOSPITAL ENCOUNTER (OUTPATIENT)
Facility: CLINIC | Age: 45
Discharge: HOME | End: 2025-02-20
Attending: ORTHOPAEDIC SURGERY | Admitting: STUDENT IN AN ORGANIZED HEALTH CARE EDUCATION/TRAINING PROGRAM
Payer: COMMERCIAL

## 2025-02-19 ENCOUNTER — APPOINTMENT (OUTPATIENT)
Dept: GENERAL RADIOLOGY | Facility: CLINIC | Age: 45
End: 2025-02-19
Attending: ORTHOPAEDIC SURGERY
Payer: COMMERCIAL

## 2025-02-19 DIAGNOSIS — M54.12 CERVICAL RADICULOPATHY: ICD-10-CM

## 2025-02-19 DIAGNOSIS — F51.01 PRIMARY INSOMNIA: ICD-10-CM

## 2025-02-19 DIAGNOSIS — M43.20 FUSION OF SPINE, UNSPECIFIED SPINAL REGION: Primary | ICD-10-CM

## 2025-02-19 LAB
ABO + RH BLD: NORMAL
BLD GP AB SCN SERPL QL: NEGATIVE
SPECIMEN EXP DATE BLD: NORMAL

## 2025-02-19 PROCEDURE — 999N000141 HC STATISTIC PRE-PROCEDURE NURSING ASSESSMENT: Performed by: ORTHOPAEDIC SURGERY

## 2025-02-19 PROCEDURE — 250N000011 HC RX IP 250 OP 636: Performed by: PHYSICIAN ASSISTANT

## 2025-02-19 PROCEDURE — 710N000011 HC RECOVERY PHASE 1, LEVEL 3, PER MIN: Performed by: ORTHOPAEDIC SURGERY

## 2025-02-19 PROCEDURE — 250N000011 HC RX IP 250 OP 636: Performed by: NURSE ANESTHETIST, CERTIFIED REGISTERED

## 2025-02-19 PROCEDURE — 22853 INSJ BIOMECHANICAL DEVICE: CPT | Performed by: ORTHOPAEDIC SURGERY

## 2025-02-19 PROCEDURE — 250N000009 HC RX 250: Performed by: ORTHOPAEDIC SURGERY

## 2025-02-19 PROCEDURE — 999N000180 XR SURGERY CARM FLUORO LESS THAN 5 MIN

## 2025-02-19 PROCEDURE — 22551 ARTHRD ANT NTRBDY CERVICAL: CPT | Performed by: ORTHOPAEDIC SURGERY

## 2025-02-19 PROCEDURE — C1713 ANCHOR/SCREW BN/BN,TIS/BN: HCPCS | Performed by: ORTHOPAEDIC SURGERY

## 2025-02-19 PROCEDURE — 36415 COLL VENOUS BLD VENIPUNCTURE: CPT | Performed by: STUDENT IN AN ORGANIZED HEALTH CARE EDUCATION/TRAINING PROGRAM

## 2025-02-19 PROCEDURE — 250N000011 HC RX IP 250 OP 636: Performed by: STUDENT IN AN ORGANIZED HEALTH CARE EDUCATION/TRAINING PROGRAM

## 2025-02-19 PROCEDURE — 250N000013 HC RX MED GY IP 250 OP 250 PS 637: Performed by: STUDENT IN AN ORGANIZED HEALTH CARE EDUCATION/TRAINING PROGRAM

## 2025-02-19 PROCEDURE — C1889 IMPLANT/INSERT DEVICE, NOC: HCPCS | Performed by: ORTHOPAEDIC SURGERY

## 2025-02-19 PROCEDURE — 22845 INSERT SPINE FIXATION DEVICE: CPT | Mod: XS | Performed by: ORTHOPAEDIC SURGERY

## 2025-02-19 PROCEDURE — 250N000009 HC RX 250: Performed by: NURSE ANESTHETIST, CERTIFIED REGISTERED

## 2025-02-19 PROCEDURE — 250N000009 HC RX 250: Performed by: STUDENT IN AN ORGANIZED HEALTH CARE EDUCATION/TRAINING PROGRAM

## 2025-02-19 PROCEDURE — 360N000085 HC SURGERY LEVEL 5 W/ FLUORO, PER MIN: Performed by: ORTHOPAEDIC SURGERY

## 2025-02-19 PROCEDURE — 272N000001 HC OR GENERAL SUPPLY STERILE: Performed by: ORTHOPAEDIC SURGERY

## 2025-02-19 PROCEDURE — 61783 SCAN PROC SPINAL: CPT | Performed by: ORTHOPAEDIC SURGERY

## 2025-02-19 PROCEDURE — 86900 BLOOD TYPING SEROLOGIC ABO: CPT | Performed by: ORTHOPAEDIC SURGERY

## 2025-02-19 PROCEDURE — 258N000003 HC RX IP 258 OP 636: Performed by: ORTHOPAEDIC SURGERY

## 2025-02-19 PROCEDURE — 250N000013 HC RX MED GY IP 250 OP 250 PS 637: Performed by: PHYSICIAN ASSISTANT

## 2025-02-19 PROCEDURE — 86850 RBC ANTIBODY SCREEN: CPT | Performed by: ORTHOPAEDIC SURGERY

## 2025-02-19 PROCEDURE — 250N000025 HC SEVOFLURANE, PER MIN: Performed by: ORTHOPAEDIC SURGERY

## 2025-02-19 PROCEDURE — 370N000017 HC ANESTHESIA TECHNICAL FEE, PER MIN: Performed by: ORTHOPAEDIC SURGERY

## 2025-02-19 PROCEDURE — 20930 SP BONE ALGRFT MORSEL ADD-ON: CPT | Performed by: ORTHOPAEDIC SURGERY

## 2025-02-19 PROCEDURE — 258N000003 HC RX IP 258 OP 636: Performed by: NURSE ANESTHETIST, CERTIFIED REGISTERED

## 2025-02-19 PROCEDURE — 22552 ARTHRD ANT NTRBD CERVICAL EA: CPT | Performed by: ORTHOPAEDIC SURGERY

## 2025-02-19 PROCEDURE — 258N000003 HC RX IP 258 OP 636: Performed by: STUDENT IN AN ORGANIZED HEALTH CARE EDUCATION/TRAINING PROGRAM

## 2025-02-19 DEVICE — IMP SCR MEDT ZEVO 3.5X17MM ST VA 7723517: Type: IMPLANTABLE DEVICE | Site: SPINE CERVICAL | Status: FUNCTIONAL

## 2025-02-19 DEVICE — IMP PLATE CERV MEDT ZEVO 37MM 2 LVL 3002037: Type: IMPLANTABLE DEVICE | Site: SPINE CERVICAL | Status: FUNCTIONAL

## 2025-02-19 DEVICE — IMPLANTABLE DEVICE: Type: IMPLANTABLE DEVICE | Site: SPINE CERVICAL | Status: FUNCTIONAL

## 2025-02-19 DEVICE — IMP SCR MEDT ZEVO 3.5X15MM ST VA 7723515: Type: IMPLANTABLE DEVICE | Site: SPINE CERVICAL | Status: FUNCTIONAL

## 2025-02-19 RX ORDER — HALOPERIDOL 5 MG/ML
1 INJECTION INTRAMUSCULAR
Status: DISCONTINUED | OUTPATIENT
Start: 2025-02-19 | End: 2025-02-19 | Stop reason: HOSPADM

## 2025-02-19 RX ORDER — DEXAMETHASONE SODIUM PHOSPHATE 10 MG/ML
10 INJECTION, SOLUTION INTRAMUSCULAR; INTRAVENOUS EVERY 8 HOURS
Status: COMPLETED | OUTPATIENT
Start: 2025-02-19 | End: 2025-02-20

## 2025-02-19 RX ORDER — GABAPENTIN 100 MG/1
300 CAPSULE ORAL
Status: DISCONTINUED | OUTPATIENT
Start: 2025-02-19 | End: 2025-02-19 | Stop reason: HOSPADM

## 2025-02-19 RX ORDER — FAMOTIDINE 20 MG/1
20 TABLET, FILM COATED ORAL 2 TIMES DAILY
Status: DISCONTINUED | OUTPATIENT
Start: 2025-02-19 | End: 2025-02-20 | Stop reason: HOSPADM

## 2025-02-19 RX ORDER — SODIUM CHLORIDE, SODIUM LACTATE, POTASSIUM CHLORIDE, CALCIUM CHLORIDE 600; 310; 30; 20 MG/100ML; MG/100ML; MG/100ML; MG/100ML
INJECTION, SOLUTION INTRAVENOUS CONTINUOUS
Status: DISCONTINUED | OUTPATIENT
Start: 2025-02-19 | End: 2025-02-19 | Stop reason: HOSPADM

## 2025-02-19 RX ORDER — HYDROMORPHONE HYDROCHLORIDE 1 MG/ML
0.2 INJECTION, SOLUTION INTRAMUSCULAR; INTRAVENOUS; SUBCUTANEOUS EVERY 5 MIN PRN
Status: DISCONTINUED | OUTPATIENT
Start: 2025-02-19 | End: 2025-02-19 | Stop reason: HOSPADM

## 2025-02-19 RX ORDER — DEXAMETHASONE SODIUM PHOSPHATE 10 MG/ML
10 INJECTION, SOLUTION INTRAMUSCULAR; INTRAVENOUS ONCE
Status: COMPLETED | OUTPATIENT
Start: 2025-02-19 | End: 2025-02-19

## 2025-02-19 RX ORDER — IPRATROPIUM BROMIDE AND ALBUTEROL SULFATE 2.5; .5 MG/3ML; MG/3ML
3 SOLUTION RESPIRATORY (INHALATION) ONCE
Status: COMPLETED | OUTPATIENT
Start: 2025-02-19 | End: 2025-02-19

## 2025-02-19 RX ORDER — PROPOFOL 10 MG/ML
INJECTION, EMULSION INTRAVENOUS PRN
Status: DISCONTINUED | OUTPATIENT
Start: 2025-02-19 | End: 2025-02-19

## 2025-02-19 RX ORDER — ONDANSETRON 2 MG/ML
INJECTION INTRAMUSCULAR; INTRAVENOUS PRN
Status: DISCONTINUED | OUTPATIENT
Start: 2025-02-19 | End: 2025-02-19

## 2025-02-19 RX ORDER — GABAPENTIN 100 MG/1
100 CAPSULE ORAL 3 TIMES DAILY
Status: DISCONTINUED | OUTPATIENT
Start: 2025-02-19 | End: 2025-02-20 | Stop reason: HOSPADM

## 2025-02-19 RX ORDER — HYDRALAZINE HYDROCHLORIDE 20 MG/ML
2.5-5 INJECTION INTRAMUSCULAR; INTRAVENOUS EVERY 10 MIN PRN
Status: DISCONTINUED | OUTPATIENT
Start: 2025-02-19 | End: 2025-02-19 | Stop reason: HOSPADM

## 2025-02-19 RX ORDER — POLYETHYLENE GLYCOL 3350 17 G/17G
17 POWDER, FOR SOLUTION ORAL DAILY
Status: DISCONTINUED | OUTPATIENT
Start: 2025-02-20 | End: 2025-02-20 | Stop reason: HOSPADM

## 2025-02-19 RX ORDER — NALOXONE HYDROCHLORIDE 0.4 MG/ML
0.4 INJECTION, SOLUTION INTRAMUSCULAR; INTRAVENOUS; SUBCUTANEOUS
Status: DISCONTINUED | OUTPATIENT
Start: 2025-02-19 | End: 2025-02-20 | Stop reason: HOSPADM

## 2025-02-19 RX ORDER — ACETAMINOPHEN 325 MG/1
650 TABLET ORAL EVERY 6 HOURS PRN
COMMUNITY

## 2025-02-19 RX ORDER — LIDOCAINE 40 MG/G
CREAM TOPICAL
Status: DISCONTINUED | OUTPATIENT
Start: 2025-02-19 | End: 2025-02-19 | Stop reason: HOSPADM

## 2025-02-19 RX ORDER — LABETALOL HYDROCHLORIDE 5 MG/ML
10 INJECTION, SOLUTION INTRAVENOUS
Status: DISCONTINUED | OUTPATIENT
Start: 2025-02-19 | End: 2025-02-19 | Stop reason: HOSPADM

## 2025-02-19 RX ORDER — KETAMINE HYDROCHLORIDE 10 MG/ML
INJECTION INTRAMUSCULAR; INTRAVENOUS PRN
Status: DISCONTINUED | OUTPATIENT
Start: 2025-02-19 | End: 2025-02-19

## 2025-02-19 RX ORDER — CEFAZOLIN SODIUM/WATER 2 G/20 ML
2 SYRINGE (ML) INTRAVENOUS
Status: COMPLETED | OUTPATIENT
Start: 2025-02-19 | End: 2025-02-19

## 2025-02-19 RX ORDER — NALOXONE HYDROCHLORIDE 0.4 MG/ML
0.2 INJECTION, SOLUTION INTRAMUSCULAR; INTRAVENOUS; SUBCUTANEOUS
Status: DISCONTINUED | OUTPATIENT
Start: 2025-02-19 | End: 2025-02-20 | Stop reason: HOSPADM

## 2025-02-19 RX ORDER — NALOXONE HYDROCHLORIDE 0.4 MG/ML
0.1 INJECTION, SOLUTION INTRAMUSCULAR; INTRAVENOUS; SUBCUTANEOUS
Status: DISCONTINUED | OUTPATIENT
Start: 2025-02-19 | End: 2025-02-19 | Stop reason: HOSPADM

## 2025-02-19 RX ORDER — ACETAMINOPHEN 325 MG/1
975 TABLET ORAL ONCE
Status: DISCONTINUED | OUTPATIENT
Start: 2025-02-19 | End: 2025-02-19

## 2025-02-19 RX ORDER — FENTANYL CITRATE 50 UG/ML
25 INJECTION, SOLUTION INTRAMUSCULAR; INTRAVENOUS EVERY 5 MIN PRN
Status: DISCONTINUED | OUTPATIENT
Start: 2025-02-19 | End: 2025-02-19 | Stop reason: HOSPADM

## 2025-02-19 RX ORDER — ACETAMINOPHEN 325 MG/1
975 TABLET ORAL EVERY 8 HOURS
Status: DISCONTINUED | OUTPATIENT
Start: 2025-02-19 | End: 2025-02-20 | Stop reason: HOSPADM

## 2025-02-19 RX ORDER — ARGININE/GLUTAMINE/CALCIUM BMB 7G-7G-1.5G
1 POWDER IN PACKET (EA) ORAL
Status: DISCONTINUED | OUTPATIENT
Start: 2025-02-19 | End: 2025-02-20

## 2025-02-19 RX ORDER — HYDROXYZINE HYDROCHLORIDE 25 MG/1
25 TABLET, FILM COATED ORAL EVERY 6 HOURS PRN
Status: DISCONTINUED | OUTPATIENT
Start: 2025-02-19 | End: 2025-02-20 | Stop reason: HOSPADM

## 2025-02-19 RX ORDER — BUPIVACAINE HYDROCHLORIDE AND EPINEPHRINE 2.5; 5 MG/ML; UG/ML
INJECTION, SOLUTION INFILTRATION; PERINEURAL PRN
Status: DISCONTINUED | OUTPATIENT
Start: 2025-02-19 | End: 2025-02-19 | Stop reason: HOSPADM

## 2025-02-19 RX ORDER — DEXAMETHASONE SODIUM PHOSPHATE 4 MG/ML
4 INJECTION, SOLUTION INTRA-ARTICULAR; INTRALESIONAL; INTRAMUSCULAR; INTRAVENOUS; SOFT TISSUE
Status: DISCONTINUED | OUTPATIENT
Start: 2025-02-19 | End: 2025-02-19 | Stop reason: HOSPADM

## 2025-02-19 RX ORDER — GABAPENTIN 100 MG/1
300 CAPSULE ORAL
Status: COMPLETED | OUTPATIENT
Start: 2025-02-19 | End: 2025-02-19

## 2025-02-19 RX ORDER — ONDANSETRON 2 MG/ML
4 INJECTION INTRAMUSCULAR; INTRAVENOUS EVERY 30 MIN PRN
Status: DISCONTINUED | OUTPATIENT
Start: 2025-02-19 | End: 2025-02-19 | Stop reason: HOSPADM

## 2025-02-19 RX ORDER — ONDANSETRON 4 MG/1
4 TABLET, ORALLY DISINTEGRATING ORAL EVERY 30 MIN PRN
Status: DISCONTINUED | OUTPATIENT
Start: 2025-02-19 | End: 2025-02-19 | Stop reason: HOSPADM

## 2025-02-19 RX ORDER — LIDOCAINE 40 MG/G
CREAM TOPICAL
Status: DISCONTINUED | OUTPATIENT
Start: 2025-02-19 | End: 2025-02-20 | Stop reason: HOSPADM

## 2025-02-19 RX ORDER — CEFAZOLIN SODIUM/WATER 2 G/20 ML
2 SYRINGE (ML) INTRAVENOUS SEE ADMIN INSTRUCTIONS
Status: DISCONTINUED | OUTPATIENT
Start: 2025-02-19 | End: 2025-02-19 | Stop reason: HOSPADM

## 2025-02-19 RX ORDER — MAGNESIUM SULFATE HEPTAHYDRATE 40 MG/ML
2 INJECTION, SOLUTION INTRAVENOUS ONCE
Status: COMPLETED | OUTPATIENT
Start: 2025-02-19 | End: 2025-02-19

## 2025-02-19 RX ORDER — METHOCARBAMOL 750 MG/1
750 TABLET, FILM COATED ORAL EVERY 6 HOURS PRN
Status: DISCONTINUED | OUTPATIENT
Start: 2025-02-19 | End: 2025-02-20 | Stop reason: HOSPADM

## 2025-02-19 RX ORDER — HYDROMORPHONE HYDROCHLORIDE 1 MG/ML
0.4 INJECTION, SOLUTION INTRAMUSCULAR; INTRAVENOUS; SUBCUTANEOUS
Status: DISCONTINUED | OUTPATIENT
Start: 2025-02-19 | End: 2025-02-20 | Stop reason: HOSPADM

## 2025-02-19 RX ORDER — HYDROMORPHONE HYDROCHLORIDE 2 MG/1
2 TABLET ORAL EVERY 4 HOURS PRN
Status: DISCONTINUED | OUTPATIENT
Start: 2025-02-19 | End: 2025-02-20 | Stop reason: HOSPADM

## 2025-02-19 RX ORDER — ACETAMINOPHEN 325 MG/1
975 TABLET ORAL ONCE
Status: DISCONTINUED | OUTPATIENT
Start: 2025-02-19 | End: 2025-02-19 | Stop reason: HOSPADM

## 2025-02-19 RX ORDER — HYDROMORPHONE HYDROCHLORIDE 2 MG/1
4 TABLET ORAL EVERY 4 HOURS PRN
Status: DISCONTINUED | OUTPATIENT
Start: 2025-02-19 | End: 2025-02-20 | Stop reason: HOSPADM

## 2025-02-19 RX ORDER — FENTANYL CITRATE 50 UG/ML
INJECTION, SOLUTION INTRAMUSCULAR; INTRAVENOUS PRN
Status: DISCONTINUED | OUTPATIENT
Start: 2025-02-19 | End: 2025-02-19

## 2025-02-19 RX ORDER — METHADONE HYDROCHLORIDE 10 MG/ML
0.25 INJECTION, SOLUTION INTRAMUSCULAR; INTRAVENOUS; SUBCUTANEOUS ONCE
Status: COMPLETED | OUTPATIENT
Start: 2025-02-19 | End: 2025-02-19

## 2025-02-19 RX ORDER — HYDROMORPHONE HYDROCHLORIDE 1 MG/ML
0.2 INJECTION, SOLUTION INTRAMUSCULAR; INTRAVENOUS; SUBCUTANEOUS
Status: DISCONTINUED | OUTPATIENT
Start: 2025-02-19 | End: 2025-02-20 | Stop reason: HOSPADM

## 2025-02-19 RX ORDER — LIDOCAINE HYDROCHLORIDE ANHYDROUS AND DEXTROSE MONOHYDRATE .8; 5 G/100ML; G/100ML
1 INJECTION, SOLUTION INTRAVENOUS CONTINUOUS
Status: DISCONTINUED | OUTPATIENT
Start: 2025-02-19 | End: 2025-02-19 | Stop reason: HOSPADM

## 2025-02-19 RX ORDER — SODIUM CHLORIDE, SODIUM LACTATE, POTASSIUM CHLORIDE, CALCIUM CHLORIDE 600; 310; 30; 20 MG/100ML; MG/100ML; MG/100ML; MG/100ML
INJECTION, SOLUTION INTRAVENOUS CONTINUOUS PRN
Status: DISCONTINUED | OUTPATIENT
Start: 2025-02-19 | End: 2025-02-19

## 2025-02-19 RX ORDER — LIDOCAINE HYDROCHLORIDE 20 MG/ML
INJECTION, SOLUTION INFILTRATION; PERINEURAL PRN
Status: DISCONTINUED | OUTPATIENT
Start: 2025-02-19 | End: 2025-02-19

## 2025-02-19 RX ORDER — ACETAMINOPHEN 325 MG/1
975 TABLET ORAL ONCE
Status: COMPLETED | OUTPATIENT
Start: 2025-02-19 | End: 2025-02-19

## 2025-02-19 RX ORDER — AMOXICILLIN 250 MG
1 CAPSULE ORAL 2 TIMES DAILY
Status: DISCONTINUED | OUTPATIENT
Start: 2025-02-19 | End: 2025-02-20 | Stop reason: HOSPADM

## 2025-02-19 RX ORDER — BISACODYL 10 MG
10 SUPPOSITORY, RECTAL RECTAL DAILY PRN
Status: DISCONTINUED | OUTPATIENT
Start: 2025-02-22 | End: 2025-02-20 | Stop reason: HOSPADM

## 2025-02-19 RX ADMIN — HYDROMORPHONE HYDROCHLORIDE 2 MG: 2 TABLET ORAL at 19:59

## 2025-02-19 RX ADMIN — GABAPENTIN 300 MG: 300 CAPSULE ORAL at 06:28

## 2025-02-19 RX ADMIN — Medication 2 G: at 07:50

## 2025-02-19 RX ADMIN — ONDANSETRON 4 MG: 2 INJECTION INTRAMUSCULAR; INTRAVENOUS at 11:15

## 2025-02-19 RX ADMIN — PROPOFOL 150 MG: 10 INJECTION, EMULSION INTRAVENOUS at 07:47

## 2025-02-19 RX ADMIN — METHADONE HYDROCHLORIDE 10.61 MG: 10 INJECTION, SOLUTION INTRAMUSCULAR; INTRAVENOUS; SUBCUTANEOUS at 07:42

## 2025-02-19 RX ADMIN — PROPOFOL 50 MG: 10 INJECTION, EMULSION INTRAVENOUS at 08:00

## 2025-02-19 RX ADMIN — ACETAMINOPHEN 975 MG: 325 TABLET, FILM COATED ORAL at 06:28

## 2025-02-19 RX ADMIN — PROPOFOL 30 MG: 10 INJECTION, EMULSION INTRAVENOUS at 08:31

## 2025-02-19 RX ADMIN — HYDROMORPHONE HYDROCHLORIDE 0.2 MG: 1 INJECTION, SOLUTION INTRAMUSCULAR; INTRAVENOUS; SUBCUTANEOUS at 13:58

## 2025-02-19 RX ADMIN — SODIUM CHLORIDE, POTASSIUM CHLORIDE, SODIUM LACTATE AND CALCIUM CHLORIDE: 600; 310; 30; 20 INJECTION, SOLUTION INTRAVENOUS at 07:39

## 2025-02-19 RX ADMIN — GABAPENTIN 100 MG: 100 CAPSULE ORAL at 15:36

## 2025-02-19 RX ADMIN — DEXAMETHASONE SODIUM PHOSPHATE 10 MG: 10 INJECTION, SOLUTION INTRAMUSCULAR; INTRAVENOUS at 17:20

## 2025-02-19 RX ADMIN — HYDROMORPHONE HYDROCHLORIDE 0.2 MG: 1 INJECTION, SOLUTION INTRAMUSCULAR; INTRAVENOUS; SUBCUTANEOUS at 14:13

## 2025-02-19 RX ADMIN — PROPOFOL 50 MG: 10 INJECTION, EMULSION INTRAVENOUS at 07:54

## 2025-02-19 RX ADMIN — FENTANYL CITRATE 25 MCG: 50 INJECTION INTRAMUSCULAR; INTRAVENOUS at 13:39

## 2025-02-19 RX ADMIN — HYDROXYZINE HYDROCHLORIDE 25 MG: 25 TABLET, FILM COATED ORAL at 15:36

## 2025-02-19 RX ADMIN — ACETAMINOPHEN 975 MG: 325 TABLET, FILM COATED ORAL at 15:36

## 2025-02-19 RX ADMIN — SODIUM CHLORIDE, POTASSIUM CHLORIDE, SODIUM LACTATE AND CALCIUM CHLORIDE: 600; 310; 30; 20 INJECTION, SOLUTION INTRAVENOUS at 07:55

## 2025-02-19 RX ADMIN — MIDAZOLAM 2 MG: 1 INJECTION INTRAMUSCULAR; INTRAVENOUS at 08:05

## 2025-02-19 RX ADMIN — DEXMEDETOMIDINE HYDROCHLORIDE 12 MCG: 100 INJECTION, SOLUTION INTRAVENOUS at 08:32

## 2025-02-19 RX ADMIN — HYDROMORPHONE HYDROCHLORIDE 0.5 MG: 1 INJECTION, SOLUTION INTRAMUSCULAR; INTRAVENOUS; SUBCUTANEOUS at 09:18

## 2025-02-19 RX ADMIN — Medication 10 MG: at 08:41

## 2025-02-19 RX ADMIN — HYDROMORPHONE HYDROCHLORIDE 0.5 MG: 1 INJECTION, SOLUTION INTRAMUSCULAR; INTRAVENOUS; SUBCUTANEOUS at 08:26

## 2025-02-19 RX ADMIN — FENTANYL CITRATE 25 MCG: 50 INJECTION INTRAMUSCULAR; INTRAVENOUS at 13:19

## 2025-02-19 RX ADMIN — MAGNESIUM SULFATE HEPTAHYDRATE 2 G: 40 INJECTION, SOLUTION INTRAVENOUS at 07:56

## 2025-02-19 RX ADMIN — DEXAMETHASONE SODIUM PHOSPHATE 10 MG: 10 INJECTION, SOLUTION INTRAMUSCULAR; INTRAVENOUS at 08:00

## 2025-02-19 RX ADMIN — PROPOFOL 100 MG: 10 INJECTION, EMULSION INTRAVENOUS at 08:05

## 2025-02-19 RX ADMIN — TRANEXAMIC ACID 725 MG: 100 INJECTION, SOLUTION INTRAVENOUS at 08:08

## 2025-02-19 RX ADMIN — TRANEXAMIC ACID 5 MG/KG/HR: 100 INJECTION, SOLUTION INTRAVENOUS at 08:12

## 2025-02-19 RX ADMIN — FAMOTIDINE 20 MG: 20 TABLET, FILM COATED ORAL at 19:49

## 2025-02-19 RX ADMIN — Medication 10 MG: at 10:17

## 2025-02-19 RX ADMIN — Medication 10 MG: at 09:34

## 2025-02-19 RX ADMIN — LIDOCAINE HYDROCHLORIDE 1 MG/KG/HR: 8 INJECTION, SOLUTION INTRAVENOUS at 07:54

## 2025-02-19 RX ADMIN — HYDROMORPHONE HYDROCHLORIDE 0.5 MG: 1 INJECTION, SOLUTION INTRAMUSCULAR; INTRAVENOUS; SUBCUTANEOUS at 10:17

## 2025-02-19 RX ADMIN — MIDAZOLAM 2 MG: 1 INJECTION INTRAMUSCULAR; INTRAVENOUS at 07:34

## 2025-02-19 RX ADMIN — DEXMEDETOMIDINE HYDROCHLORIDE 8 MCG: 100 INJECTION, SOLUTION INTRAVENOUS at 10:51

## 2025-02-19 RX ADMIN — DEXMEDETOMIDINE HYDROCHLORIDE 8 MCG: 100 INJECTION, SOLUTION INTRAVENOUS at 08:41

## 2025-02-19 RX ADMIN — GABAPENTIN 100 MG: 100 CAPSULE ORAL at 19:49

## 2025-02-19 RX ADMIN — FENTANYL CITRATE 100 MCG: 50 INJECTION INTRAMUSCULAR; INTRAVENOUS at 07:47

## 2025-02-19 RX ADMIN — Medication 30 MG: at 08:05

## 2025-02-19 RX ADMIN — IPRATROPIUM BROMIDE AND ALBUTEROL SULFATE 3 ML: .5; 3 SOLUTION RESPIRATORY (INHALATION) at 07:20

## 2025-02-19 RX ADMIN — PHENYLEPHRINE HYDROCHLORIDE 100 MCG: 10 INJECTION INTRAVENOUS at 07:46

## 2025-02-19 RX ADMIN — SENNOSIDES AND DOCUSATE SODIUM 1 TABLET: 50; 8.6 TABLET ORAL at 19:49

## 2025-02-19 RX ADMIN — ACETAMINOPHEN 975 MG: 325 TABLET, FILM COATED ORAL at 22:30

## 2025-02-19 RX ADMIN — PROPOFOL 40 MG: 10 INJECTION, EMULSION INTRAVENOUS at 08:50

## 2025-02-19 RX ADMIN — FENTANYL CITRATE 25 MCG: 50 INJECTION INTRAMUSCULAR; INTRAVENOUS at 13:29

## 2025-02-19 RX ADMIN — LIDOCAINE HYDROCHLORIDE 100 MG: 20 INJECTION, SOLUTION INFILTRATION; PERINEURAL at 07:47

## 2025-02-19 RX ADMIN — SUCCINYLCHOLINE CHLORIDE 100 MG: 20 INJECTION, SOLUTION INTRAMUSCULAR; INTRAVENOUS; PARENTERAL at 07:47

## 2025-02-19 RX ADMIN — PROPOFOL 150 MCG/KG/MIN: 10 INJECTION, EMULSION INTRAVENOUS at 07:52

## 2025-02-19 RX ADMIN — Medication 1 MG: at 22:30

## 2025-02-19 ASSESSMENT — ACTIVITIES OF DAILY LIVING (ADL)
ADLS_ACUITY_SCORE: 33
ADLS_ACUITY_SCORE: 34
ADLS_ACUITY_SCORE: 33
ADLS_ACUITY_SCORE: 33
ADLS_ACUITY_SCORE: 32
ADLS_ACUITY_SCORE: 34
ADLS_ACUITY_SCORE: 34
ADLS_ACUITY_SCORE: 33
ADLS_ACUITY_SCORE: 34
ADLS_ACUITY_SCORE: 32
ADLS_ACUITY_SCORE: 18
ADLS_ACUITY_SCORE: 18
ADLS_ACUITY_SCORE: 34
ADLS_ACUITY_SCORE: 34

## 2025-02-19 NOTE — ANESTHESIA PREPROCEDURE EVALUATION
"Anesthesia Pre-Procedure Evaluation    Patient: Lee Mckenzie   MRN: [id number] : 1980        Procedure : Procedure(s):  Anterior Cervical Diskectomy and Fusion with Anterior Plate Fixation Cervical 5-7 (2 Levels);  Right or Left anterior Iliac Crest Bone Graft [place]          Past Medical History:   Diagnosis Date    Disc degeneration, lumbar     Osteoarthritis     Tobacco abuse       Past Surgical History:   Procedure Laterality Date    FUSION LUMBAR ANTERIOR ONE LEVEL N/A 2018    Procedure: Part 1:  (supine) Anterior Lumbar Interbody Fusion Lumbar 5-Sacral 1; Use Of rhBMP-2   ;  Surgeon: Charlie Nixon MD;  [facility]    MANDIBLE FRACTURE SURGERY      OTS FUSION SPINE POSTERIOR LUMBAR MINIMALLY INVASIVE ONE LEVEL N/A 2018    Procedure: Part 2:  (prone) Stealth Guided Pedicle Screw Fixation Lumbar 5 -Sacral 1; Right Hemilaminectomy-Discectomy Lumbar 5-Sacral 1;  Surgeon: Charlie Nixon MD;  [facility]    RELEASE TRIGGER FINGER Left       Allergies   Allergen Reactions    Morphine Other (See Comments)     Sensitivity      Social History     Tobacco Use    Smoking status: Former     Types: Cigarettes    Smokeless tobacco: Never    Tobacco comments:     Quit middle    Substance Use Topics    Alcohol use: No      Wt Readings from Last 1 Encounters:   25 71.7 kg (158 lb)        Anesthesia Evaluation   Pt has had prior anesthetic. Type: General.    No history of anesthetic complications       ROS/MED HX  ENT/Pulmonary: Comment:   # Quit smoking middle of December, then \"slipped up\" a couple weeks ago, but has since been abstaining for ~1 Month  # Uses cannabis - smokes a few joints most days  # Has an albuterol inhaler that was prescribed in the past when he had a URI.  No asthma diagnosis.  No recent use of the albuterol.     (+)                tobacco use, Past use,                    (-) asthma and recent URI   Neurologic: Comment: Neck " pain with radicular component of numbness to bilateral hands.       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  Denies any exertional dyspnea or angina.    Hematologic:     (+)      anemia,          Musculoskeletal: Comment:   # Cervical spondylosis with radiculopathy.  Neck pain, RUE pain, bilateral hand paresthesias and numbness.  Worsens with neck extension.  # S/p lumbar spine fusion  # Sacrococcygeal pain  # S/p trigger finger release      GI/Hepatic:  - neg GI/hepatic ROS     Renal/Genitourinary: Comment: Per labs 1/2025: eGFR >90 - neg Renal ROS     Endo:  - neg endo ROS     Psychiatric/Substance Use:     (+) psychiatric history anxiety   Recreational drug usage: Cannabis.    Infectious Disease:  - neg infectious disease ROS     Malignancy:  - neg malignancy ROS     Other:  - neg other ROS    (+)  , H/O Chronic Pain,         Physical Exam    Airway  airway exam normal    Comment: Ongoing neck pain that worsens with neck hyperextension. Reports ongoing tingling to both arms & hands, but not acute radicular symptoms triggered by neck movement.     Mallampati: II   TM distance: > 3 FB   Neck ROM: full   Mouth opening: > 3 cm    Respiratory Devices and Support         Dental       (+) Modest Abnormalities - crowns, retainers, 1 or 2 missing teeth      Cardiovascular   cardiovascular exam normal       Rhythm and rate: regular     Pulmonary   pulmonary exam normal        breath sounds clear to auscultation           OUTSIDE LABS:  CBC:   Lab Results   Component Value Date    WBC 10.1 01/23/2025    WBC 13.2 (H) 11/16/2018    HGB 13.8 01/23/2025    HGB 14.1 11/16/2018    HCT 40.7 01/23/2025    HCT 42.0 11/16/2018    PLT  "254 01/23/2025     11/16/2018     BMP:   Lab Results   Component Value Date     01/23/2025     11/06/2018    POTASSIUM 4.2 01/23/2025    POTASSIUM 3.8 11/06/2018    CHLORIDE 105 01/23/2025    CHLORIDE 104 11/06/2018    CO2 25 01/23/2025    CO2 30 11/06/2018    BUN 14.4 01/23/2025    BUN 14 11/06/2018    CR 0.85 01/23/2025    CR 0.89 11/06/2018    GLC 85 01/23/2025     (H) 11/06/2018     COAGS:   Lab Results   Component Value Date    INR 0.96 10/08/2018     POC: No results found for: \"BGM\", \"HCG\", \"HCGS\"  HEPATIC: No results found for: \"ALBUMIN\", \"PROTTOTAL\", \"ALT\", \"AST\", \"GGT\", \"ALKPHOS\", \"BILITOTAL\", \"BILIDIRECT\", \"MARCELL\"  OTHER:   Lab Results   Component Value Date    NATE 9.3 01/23/2025    CRP 5.0 11/16/2018    SED 14 11/16/2018       Anesthesia Plan    ASA Status:  2    NPO Status:  NPO Appropriate    Anesthesia Type: General.     - Airway: ETT   Induction: Intravenous.   Maintenance: TIVA.   Techniques and Equipment:     - Airway: Video-Laryngoscope (Glydescope with back up of handheld FOB to minimize neck hypeextension.)     - Lines/Monitors: 2nd IV (# standard ASA monitors + ClearSite #Neuromonitoring:  Biotronic tcMEPs and SSEPs and UE EMGs)     Drips/Meds: Methadone single dose at induction time, Mg 2gr, Lidocaine drip throughout the case and continued through PACU. Ketamine stick for bolus on induction and hourly.     Consents    Anesthesia Plan(s) and associated risks, benefits, and realistic alternatives discussed. Questions answered and patient/representative(s) expressed understanding.     - Discussed:     - Discussed with:  Patient      - Extended Intubation/Ventilatory Support Discussed: No.      - Patient is DNR/DNI Status: No     Use of blood products discussed: Yes.     - Discussed with: Patient.     - Consented: consented to blood products     Postoperative Care    Pain management: Oral pain medications, IV analgesics, Multi-modal analgesia.   PONV prophylaxis: " Ondansetron (or other 5HT-3), Dexamethasone or Solumedrol     Comments:               Shantal Winter MD    I have reviewed the pertinent notes and labs in the chart from the past 30 days and (re)examined the patient.  Any updates or changes from those notes are reflected in this note.    Clinically Significant Risk Factors Present on Admission

## 2025-02-19 NOTE — ANESTHESIA POSTPROCEDURE EVALUATION
Patient: Lee Mckenzie    Procedure: Procedure(s):  Anterior Cervical Diskectomy and Fusion with Anterior Plate Fixation Cervical 5-7 (2 Levels);       Anesthesia Type:  General    Note:  Disposition: Admission   Postop Pain Control: Uneventful            Sign Out: Well controlled pain   PONV: No   Neuro/Psych: Uneventful            Sign Out: Acceptable/Baseline neuro status   Airway/Respiratory: Uneventful            Sign Out: Acceptable/Baseline resp. status   CV/Hemodynamics: Uneventful            Sign Out: Acceptable CV status; No obvious hypovolemia; No obvious fluid overload   Other NRE: NONE   DID A NON-ROUTINE EVENT OCCUR? No           Last vitals:  Vitals Value Taken Time   /88 02/19/25 1345   Temp 36.7  C (98.1  F) 02/19/25 1300   Pulse 66 02/19/25 1351   Resp 10 02/19/25 1351   SpO2 99 % 02/19/25 1351   Vitals shown include unfiled device data.    Electronically Signed By: Shantal Winter MD  February 19, 2025  1:51 PM

## 2025-02-19 NOTE — ANESTHESIA PROCEDURE NOTES
Airway       Patient location during procedure: OR       Procedure Start/Stop Times: 2/19/2025 7:49 AM  Staff -        CRNA: Susi Ruiz APRN CRNA       Other Anesthesia Staff: Rosa Maria Vinson       Performed By: SRNAIndications and Patient Condition       Indications for airway management: finn-procedural       Induction type:intravenous       Mask difficulty assessment: 1 - vent by mask    Final Airway Details       Final airway type: endotracheal airway       Successful airway: ETT - single  Endotracheal Airway Details        ETT size (mm): 7.5       Cuffed: yes       Cuff volume (mL): 10       Successful intubation technique: video laryngoscopy       VL Blade Size: Glidescope 3       Grade View of Cords: 1       Adjucts: stylet       Position: Center       Measured from: lips       Secured at (cm): 22       Bite block used: Soft    Post intubation assessment        Placement verified by: capnometry, equal breath sounds and chest rise        Number of attempts at approach: 1       Secured with: tape       Ease of procedure: easy       Dentition: Intact    Medication(s) Administered   Medication Administration Time: 2/19/2025 7:49 AM

## 2025-02-19 NOTE — OR NURSING
PACU to Inpatient Nursing Handoff    Patient Lee Mckenzie is a 44 year old male who speaks English.   Procedure Procedure(s):  Anterior Cervical Diskectomy and Fusion with Anterior Plate Fixation Cervical 5-7 (2 Levels);   Surgeon(s) Primary: Charlie Nixon MD  Fellow - Assisting: Meir Mcdonnell MD     Allergies   Allergen Reactions    Morphine Other (See Comments)     Sensitivity       Isolation  [unfilled]     Past Medical History   has a past medical history of Disc degeneration, lumbar, Osteoarthritis, and Tobacco abuse.    Anesthesia General   Dermatome Level     Preop Meds acetaminophen (Tylenol) - time given: 0628  gabapentin (Neurontin) - time given: 0628  duoneb - time given: 0720    Not applicable   Intraop Meds dexamethasone (Decadron)  dexmedetomidine (Precedex): 28 mcg total  fentanyl (Sublimaze): 100 mcg total  hydromorphone (Dilaudid): 1.5 mg total  ketamine (Ketalar): 60 mg given  ondansetron (Zofran): last given at 1115  Magnesium, methadone, versed, TXA, lidocaine gtt   Local Meds Yes   Antibiotics cefazolin (Ancef) - last given at 0750     Pain Patient Currently in Pain: no (Currently in a good position. Has pain with some positioning.)   PACU meds  fentanyl (Sublimaze): 75 mcg (total dose) last given at 1339   hydromorphone (Dilaudid): .4 mg (total dose) last given at 1417    PCA / epidural No   Capnography     Telemetry     Inpatient Telemetry Monitor Ordered? No        Labs Glucose Lab Results   Component Value Date    GLC 85 01/23/2025     11/06/2018       Hgb Lab Results   Component Value Date    HGB 13.8 01/23/2025    HGB 14.1 11/16/2018       INR Lab Results   Component Value Date    INR 0.96 10/08/2018      PACU Imaging Not applicable     Wound/Incision Incision/Surgical Site 11/05/18 Back (Active)   Number of days: 2298       Incision/Surgical Site 02/19/25 Anterior Neck (Active)   Incision Assessment WDL 02/19/25 1107   Dressing Per Plan of Care;Other  (Comment) 02/19/25 1107   Closure Sutures;Approximated;Liquid bandage 02/19/25 1107   Incision Drainage Amount None 02/19/25 1107   Dressing Intervention Clean, dry, intact;New dressing applied;Other (Comment) 02/19/25 1107   Number of days: 0      CMS        Equipment ice pack   Other LDA ETT Cuffed 7.5 mm (Active)   Number of days: 0        IV Access Peripheral IV 02/19/25 Anterior;Right Lower forearm (Active)   Number of days: 0       Peripheral IV 02/19/25 Left Hand (Active)   Number of days: 0      Blood Products Not applicable EBL 25 mL   Intake/Output Date 02/19/25 0700 - 02/20/25 0659   Shift 2427-2693 0575-1909 3786-6772 24 Hour Total   INTAKE   I.V. 1205   1205   Shift Total(mL/kg) 1205(16.62)   1205(16.62)   OUTPUT   Urine 720   720   Blood 25   25   Shift Total(mL/kg) 745(10.28)   745(10.28)   Weight (kg) 72.5 72.5 72.5 72.5      Drains / Wong Drain Closed/Suction 1 Left Neck Bulb 7 Thai (Active)   Number of days: 0      Time of void PreOp Time of Void Prior to Procedure: 0530 (02/19/25 0609)    PostOp      Diapered? No   Bladder Scan     PO    water     Vitals    B/P: 115/84  T: 97.9  F (36.6  C)    Temp src: Oral  P:  Pulse: 58 (02/19/25 0547)          R: 16  O2:  SpO2: 99 %    O2 Device: None (Room air) (02/19/25 0547)              Family/support present significant other   Patient belongings     Patient transported on cart and air mat   DC meds/scripts (obs/outpt) Not applicable   Inpatient Pain Meds Released? Yes       Special needs/considerations None   Tasks needing completion none       Jessie Herbert, RN  [place]

## 2025-02-19 NOTE — PROGRESS NOTES
5 med/surg Admission Note    Reason for admission: C5-C7 diskectomy and fusion  Primary team notified of pt arrival.  Admitted from: PACU  Via: Patient transport  Accompanied by: Jessie - Spouse  Belongings: Placed in closet; valuables sent home with family  Admission Required Doc Completed: Yes  Mobility Devices Utilized by Patient Provided (i.e. walker, wheelchair, etc.): NA  Teaching: Orientation to unit and call light- call light within reach, use of console, meal times, when to call for the RN, and enforced importance of safety.  IV Access: R PIV SL  Telemetry: No  Ht./Wt.: Completed  Code Status verified on armband: Yes  2 RN Skin Assessment Completed with: Charge nurse Ariana Jones  Suction/Ambu bag/Flowmeter at bedside: Yes    Pt status:     Temp:  [97.9  F (36.6  C)-99.1  F (37.3  C)] 99  F (37.2  C)  Pulse:  [58-87] 74  Resp:  [9-16] 16  BP: (115-151)/(81-94) 131/81  SpO2:  [95 %-100 %] 98 %

## 2025-02-19 NOTE — OP NOTE
Date of Service:  2/19/2025      Surgeon:  Charlie Nixon MD   Assistant:  (1) Meir Mckenna MD, Spine Fellow.  No qualified resident available.  Dr. Mckenna's assistance was deemed necessary given case complexity, and for all parts of the procedure, including positioning, exposure, performing the surgical procedure, and closure.  (2) Stacie Andres MS3.      Preoperative Diagnosis:     1.  Chronic axial neck pain with R arm radicular pain.  2.  Advanced spondylosis C5-C7, milder at C4-5.  3.  Right C7 radiculopathy, with positive response to Right C7 SNRB with steroid (11/18/24 Dr. Tai).      Postoperative Diagnosis:     Same      Procedures:   1.  Anterior cervical diskectomy and fusion (ACDF) C5-6 and C6-7 using structural cortical cancellous allograft.   2.  Anterior segmental plate-screw fixation C5-C7.  3.  Use of operating microscope.  4.  Intraoperative spinal cord monitoring using SSEPs, transcranial MEP's and bilateral upper extremity EMGs.      Anesthesia:  General endotracheal.   Local anesthetic:  0.25% marcaine + epinephrine = 10 mL.  EBL: 25 mL.  Complications:  None apparent.   Implants / Equipment used:   1.  Medtronic structural anatomic corticocancellous allograft 16 x 14 mm: C5-6 = 6 mm height; C6-7 = 6 mm height.  2.  Medtronic Zevo plate 37 mm, with 3.5 mm screws: C5 = 17 mm bilateral; C6 = 17 mm right, 15 mm left; C7 = 17 mm bilateral.  3.  TXA medium dose (10/5).      Indications:  44 year old male with chronic neck and right arm radicular pain.  Imaging revealed advanced spondylosis with right greater than left stenosis C5-6 and C6-7.  Underwent right C7 nerve root block, with good temporary relief.  Tried nonoperative treatment, continues to have significant disabling symptoms.  I thus offered surgery in form of two-level anterior cervical decompression and fusion with anterior plate fixation.  Consented after thorough discussion of the rationale, risks, benefits and alternatives.  Insurance,  however, denied use of synthetic cage; they said they would approve surgery only if structural allograft was used.  I discussed with patient, and he agreed to use of structural allograft.       Details:  Properly identified in preop area, site marked, consent signed.  Wheeled to operating theater.  Brief earlier performed.  General anesthesia administered.  Neuromonitoring electrodes placed by [employer] technician antibiotics given: Cefazolin IV.  Wong inserted.  Positioned supine on flat Trios table.  Arms tucked to sides; shoulders taped down.  Neck slightly extended.  Anterior neck squared off and prepped with Chlorhexidine scrub, ChloraPrep and draped in sterile fashion.  Surgical timeout performed.  Baseline spinal cord signals obtained.  Good signals from all extremities.    Used lateral C-arm imaging to localize skin incision.  4 cm incision made.  Left anterior Cortes-Joseph approach performed.  Platysma split in line with incision.  Dissected medial to SCM and carotid sheath, and lateral to strap muscles, trachea and esophagus; through pretracheal and prevertebral fascia.  Once down on spine, attached forcep to longus coli at approximate C6-7 level.  Lateral image showed correct needle position, confirmed by radiologist; confirmatory timeout performed.  Lifted longus colli bilaterally off spine C5-C7.  First worked at C5-6 level.  Trimline retractor blades placed; frame applied.  Trenton pins inserted into adjacent vertebrae.  Distraction applied using Trenton frame.  Microscope brought in.  Rectangular annulotomy using #15 blade.  Disc loosened using straight and angled curettes; disc material removed using pituitary.  Elgin used to remove anterior and overhanging osteophytes, and prepare the endplates as deep as the posterior annulus.  Posterior osteophytes removed using 2 and 3 mm Kerrisons.  Bilateral anterior foraminotomies performed using same.  Resected PLL.  I created central holes on both endplates  for ingrowth purposes.  Epidural bleeding controlled using bipolar cautery, Surgiflo and cottonoids.  Placed trial spacers; elected to use 6 mm height allograft spacer.  Allograft inserted into disc space.  Microscope removed.      Same procedure performed at C6-7, thus a total of 2 levels.  At this level, we again used structural cortical cancellous allograft, also 6 mm height.  Lateral C-arm image showed good cage depth.      Flattened anterior osteophytes using franci.  Selected 37 mm plate, bent into further lordosis.  Plate positioned over cervical spine.  Drilled holes thru plate into vertebral bodies, using single or double barrel drill guide and drill with 11 mm stop.  Screws inserted, two into each vertebra.  Screws tightened, locking mechanism deployed.  Final lateral and AP C-arm image showed all implants in good position.  Of note, placement of the anterior plate with screw fixation was not placed to anchor the interbody device but rather for spinal fixation/stabilization to aid in successful fusion.    Irrigation.  Deep Fr 7 round VALERIE drain placed.  Closed with 2-0 Vicryl for platysma, 3-0 Vicryl for subq tissue and 4-0 Monocryl for skin.  2-0 silk drain stitch placed.  Iliac crest wound closed using 0 vicryl, 2-0 vicryl and 3-0 monocryl.  Skin glue and sterile dressings applied.  Anesthetic injected.  Tolerated procedure well, taken off general anesthetic, transferred to recovery in satisfactory condition.       Postop:  Discharge to home today or outpatient overnight stay (observation).  Antibiotics x 2 more doses.  Mechanical DVT prophylaxis.  Advance diet slowly as tolerated.  No lifting more than 10 pounds, no excessive bending or twisting.  Soft collar for comfort.  RTC 6 weeks with cervical ap-lat x-rays.

## 2025-02-19 NOTE — PHARMACY-ADMISSION MEDICATION HISTORY
Pharmacist Admission Medication History    Admission medication history is complete. The information provided in this note is only as accurate as the sources available at the time of the update.    Information Source(s): Patient and CareEverywhere/SureScripts via phone    Pertinent Information: Patient takes no prescription medications    Changes made to PTA medication list:  Added: None  Deleted:   Albuterol inhaler (patient reported receiving one years ago when sick but has since lost it and doesn't use it)  Changed:   Naproxen (added frequency)  Acetaminophen (dose change per patient)    Allergies reviewed with patient and updates made in EHR: yes    Medication History Completed By: iRccardo Can [facility] 2/19/2025 4:50 PM    PTA Med List   Medication Sig Last Dose/Taking    acetaminophen (TYLENOL) 325 MG tablet Take 650 mg by mouth every 6 hours as needed for mild pain. Past Week    Naproxen Sodium (ALEVE PO) Take 220-440 mg by mouth 2 times daily as needed for moderate pain. Past Month

## 2025-02-19 NOTE — PLAN OF CARE
"Goal Outcome Evaluation:      Plan of Care Reviewed With: patient, spouse    Overall Patient Progress: no changeOverall Patient Progress: no change    Outcome Evaluation: See progress note.      VS: /81 (BP Location: Left arm)   Pulse 74   Temp 99  F (37.2  C) (Oral)   Resp 16   Ht 1.753 m (5' 9\")   Wt 72.5 kg (159 lb 13.3 oz)   SpO2 98%   BMI 23.60 kg/m     O2: Stable on RA, denied SOB & CP   Output: Spontaneously voids to bathroom without difficulty   Last BM: 2/18/25, continent   Activity: SBA to bathroom   Skin: Abrasion on scalp & bottom of L foot   Pain: Managed w/ prn tylenol, atarax & scheduled gabapentin   CMS: A/O x4, baseline numbness to LE   Dressing: Dressing to anterior cervical incision CDI, bandage placed on bottom of L foot abrasion   Diet: Reg/thin/whole pills with water, no appetite since surgery   LDA: VALERIE drain with no output, R PIV SL   Plan: Manage pain   Additional Info:        "

## 2025-02-19 NOTE — ANESTHESIA CARE TRANSFER NOTE
Patient: Lee Mckenzie    Procedure: Procedure(s):  Anterior Cervical Diskectomy and Fusion with Anterior Plate Fixation Cervical 5-7 (2 Levels);       Diagnosis: Cervical spondylosis with radiculopathy [M47.22]  Diagnosis Additional Information: No value filed.    Anesthesia Type:   General     Note:    Oropharynx: oropharynx clear of all foreign objects and spontaneously breathing  Level of Consciousness: awake  Oxygen Supplementation: face mask  Level of Supplemental Oxygen (L/min / FiO2): 6  Independent Airway: airway patency satisfactory and stable  Dentition: dentition unchanged  Vital Signs Stable: post-procedure vital signs reviewed and stable  Report to RN Given: handoff report given  Patient transferred to: PACU    Handoff Report: Identifed the Patient, Identified the Reponsible Provider, Reviewed the pertinent medical history, Discussed the surgical course, Reviewed Intra-OP anesthesia mangement and issues during anesthesia, Set expectations for post-procedure period and Allowed opportunity for questions and acknowledgement of understanding      Vitals:  Vitals Value Taken Time   /92    Temp     Pulse 82 02/19/25 1203   Resp 14    SpO2 95 % 02/19/25 1203   Vitals shown include unfiled device data.    Electronically Signed By: ABDI Forman CRNA  February 19, 2025  12:03 PM

## 2025-02-20 ENCOUNTER — APPOINTMENT (OUTPATIENT)
Dept: OCCUPATIONAL THERAPY | Facility: CLINIC | Age: 45
End: 2025-02-20
Attending: ORTHOPAEDIC SURGERY
Payer: COMMERCIAL

## 2025-02-20 VITALS
OXYGEN SATURATION: 97 % | HEART RATE: 53 BPM | RESPIRATION RATE: 18 BRPM | TEMPERATURE: 98 F | DIASTOLIC BLOOD PRESSURE: 69 MMHG | HEIGHT: 69 IN | SYSTOLIC BLOOD PRESSURE: 104 MMHG | BODY MASS INDEX: 23.67 KG/M2 | WEIGHT: 159.83 LBS

## 2025-02-20 PROCEDURE — 250N000011 HC RX IP 250 OP 636: Performed by: STUDENT IN AN ORGANIZED HEALTH CARE EDUCATION/TRAINING PROGRAM

## 2025-02-20 PROCEDURE — 999N000147 HC STATISTIC PT IP EVAL DEFER

## 2025-02-20 PROCEDURE — 97166 OT EVAL MOD COMPLEX 45 MIN: CPT | Mod: GO

## 2025-02-20 PROCEDURE — 97530 THERAPEUTIC ACTIVITIES: CPT | Mod: GO

## 2025-02-20 PROCEDURE — 97535 SELF CARE MNGMENT TRAINING: CPT | Mod: GO

## 2025-02-20 PROCEDURE — 250N000013 HC RX MED GY IP 250 OP 250 PS 637: Performed by: STUDENT IN AN ORGANIZED HEALTH CARE EDUCATION/TRAINING PROGRAM

## 2025-02-20 RX ORDER — HYDROXYZINE HYDROCHLORIDE 25 MG/1
25 TABLET, FILM COATED ORAL EVERY 6 HOURS PRN
Qty: 25 TABLET | Refills: 0 | Status: SHIPPED | OUTPATIENT
Start: 2025-02-20 | End: 2025-02-24

## 2025-02-20 RX ORDER — AMOXICILLIN 250 MG
1 CAPSULE ORAL 2 TIMES DAILY
Qty: 25 TABLET | Refills: 0 | Status: SHIPPED | OUTPATIENT
Start: 2025-02-20 | End: 2025-02-24

## 2025-02-20 RX ORDER — ARGININE/GLUTAMINE/CALCIUM BMB 7G-7G-1.5G
1 POWDER IN PACKET (EA) ORAL 2 TIMES DAILY WITH MEALS
Qty: 28 PACKET | Refills: 0 | Status: SHIPPED | OUTPATIENT
Start: 2025-02-20 | End: 2025-03-06

## 2025-02-20 RX ORDER — POLYETHYLENE GLYCOL 3350 17 G/17G
17 POWDER, FOR SOLUTION ORAL DAILY
Qty: 510 G | Refills: 0 | Status: SHIPPED | OUTPATIENT
Start: 2025-02-20 | End: 2025-02-24

## 2025-02-20 RX ORDER — HYDROMORPHONE HYDROCHLORIDE 2 MG/1
2-4 TABLET ORAL EVERY 4 HOURS PRN
Qty: 60 TABLET | Refills: 0 | Status: SHIPPED | OUTPATIENT
Start: 2025-02-20 | End: 2025-02-24

## 2025-02-20 RX ORDER — GABAPENTIN 100 MG/1
100 CAPSULE ORAL 3 TIMES DAILY
Qty: 90 CAPSULE | Refills: 0 | Status: SHIPPED | OUTPATIENT
Start: 2025-02-20 | End: 2025-02-24

## 2025-02-20 RX ORDER — ARGININE/GLUTAMINE/CALCIUM BMB 7G-7G-1.5G
1 POWDER IN PACKET (EA) ORAL 2 TIMES DAILY WITH MEALS
Status: DISCONTINUED | OUTPATIENT
Start: 2025-02-20 | End: 2025-02-20 | Stop reason: HOSPADM

## 2025-02-20 RX ORDER — METHOCARBAMOL 750 MG/1
750 TABLET, FILM COATED ORAL EVERY 6 HOURS PRN
Qty: 40 TABLET | Refills: 0 | Status: SHIPPED | OUTPATIENT
Start: 2025-02-20 | End: 2025-02-24

## 2025-02-20 RX ADMIN — SENNOSIDES AND DOCUSATE SODIUM 1 TABLET: 50; 8.6 TABLET ORAL at 08:08

## 2025-02-20 RX ADMIN — FAMOTIDINE 20 MG: 20 TABLET, FILM COATED ORAL at 08:09

## 2025-02-20 RX ADMIN — DEXAMETHASONE SODIUM PHOSPHATE 10 MG: 10 INJECTION, SOLUTION INTRAMUSCULAR; INTRAVENOUS at 00:41

## 2025-02-20 RX ADMIN — HYDROMORPHONE HYDROCHLORIDE 4 MG: 2 TABLET ORAL at 13:09

## 2025-02-20 RX ADMIN — ACETAMINOPHEN 975 MG: 325 TABLET, FILM COATED ORAL at 06:39

## 2025-02-20 RX ADMIN — HYDROMORPHONE HYDROCHLORIDE 4 MG: 2 TABLET ORAL at 08:55

## 2025-02-20 RX ADMIN — GABAPENTIN 100 MG: 100 CAPSULE ORAL at 13:10

## 2025-02-20 RX ADMIN — HYDROMORPHONE HYDROCHLORIDE 2 MG: 2 TABLET ORAL at 00:46

## 2025-02-20 RX ADMIN — HYDROXYZINE HYDROCHLORIDE 25 MG: 25 TABLET, FILM COATED ORAL at 08:08

## 2025-02-20 RX ADMIN — HYDROMORPHONE HYDROCHLORIDE 4 MG: 2 TABLET ORAL at 04:42

## 2025-02-20 RX ADMIN — NALOXEGOL OXALATE 25 MG: 12.5 TABLET, FILM COATED ORAL at 06:39

## 2025-02-20 RX ADMIN — METHOCARBAMOL 750 MG: 750 TABLET ORAL at 06:39

## 2025-02-20 RX ADMIN — GABAPENTIN 100 MG: 100 CAPSULE ORAL at 08:09

## 2025-02-20 ASSESSMENT — ACTIVITIES OF DAILY LIVING (ADL)
ADLS_ACUITY_SCORE: 34

## 2025-02-20 NOTE — PROGRESS NOTES
02/20/25 1050   Appointment Info   Signing Clinician's Name / Credentials (OT) Kadi Yañez, OTR/L   Rehab Comments (OT) spinal prec, soft collar for comfort   Quick Adds   Quick Adds Certification   Living Environment   People in Home spouse   Current Living Arrangements house   Home Accessibility stairs to enter home;stairs within home   Number of Stairs, Main Entrance 3   Stair Railings, Main Entrance none   Number of Stairs, Within Home, Primary greater than 10 stairs   Stair Railings, Within Home, Primary railings safe and in good condition   Living Environment Comments 3 ALISON with no rails. All needs met on main level with tub shower   Self-Care   Usual Activity Tolerance good   Current Activity Tolerance moderate   Equipment Currently Used at Home cane, straight  (reacher)   Fall history within last six months no   Activity/Exercise/Self-Care Comment ind with I/ADLs at baseline   General Information   Onset of Illness/Injury or Date of Surgery 02/19/25   Referring Physician Dr. Nixon   Patient/Family Therapy Goal Statement (OT) to return home   Additional Occupational Profile Info/Pertinent History of Current Problem per chart, 44 year old male with PMH including Cervical radiculopathy   Existing Precautions/Restrictions spinal;weight bearing   Left Upper Extremity (Weight-bearing Status) other (see comments)  (10#)   Right Upper Extremity (Weight-bearing Status) other (see comments)  (10#)   Left Lower Extremity (Weight-bearing Status) weight-bearing as tolerated (WBAT)   Right Lower Extremity (Weight-bearing Status) weight-bearing as tolerated (WBAT)   Cognitive Status Examination   Orientation Status orientation to person, place and time   Affect/Mental Status (Cognitive) WFL   Follows Commands WFL   Visual Perception   Visual Impairment/Limitations corrective lenses for reading   Sensory   Sensory Quick Adds sensation intact   Pain Assessment   Patient Currently in Pain Yes, see Vital Sign  flowsheet   Posture   Posture not impaired   Range of Motion Comprehensive   General Range of Motion no range of motion deficits identified   Strength Comprehensive (MMT)   General Manual Muscle Testing (MMT) Assessment no strength deficits identified   Muscle Tone Assessment   Muscle Tone Quick Adds No deficits were identified   Coordination   Upper Extremity Coordination No deficits were identified   Bed Mobility   Bed Mobility supine-sit;sit-supine   Supine-Sit Dane (Bed Mobility) supervision   Sit-Supine Dane (Bed Mobility) supervision   Transfers   Transfers bed-chair transfer;sit-stand transfer;toilet transfer   Transfer Skill: Bed to Chair/Chair to Bed   Bed-Chair Dane (Transfers) supervision   Sit-Stand Transfer   Sit-Stand Dane (Transfers) supervision   Toilet Transfer   Dane Level (Toilet Transfer) supervision   Balance   Balance Assessment no deficits identified   Activities of Daily Living   BADL Assessment/Intervention upper body dressing;lower body dressing;toileting   Upper Body Dressing Assessment/Training   Dane Level (Upper Body Dressing) supervision   Lower Body Dressing Assessment/Training   Dane Level (Lower Body Dressing) supervision   Toileting   Dane Level (Toileting) supervision   Clinical Impression   Criteria for Skilled Therapeutic Interventions Met (OT) Yes, treatment indicated   OT Diagnosis decreased ADL ind   Influenced by the following impairments s/p cervical surgery   OT Problem List-Impairments impacting ADL problems related to;activity tolerance impaired;post-surgical precautions   Assessment of Occupational Performance 3-5 Performance Deficits   Identified Performance Deficits dressing, toileting, showering   Planned Therapy Interventions (OT) ADL retraining   Clinical Decision Making Complexity (OT) detailed assessment/moderate complexity   Risk & Benefits of therapy have been explained evaluation/treatment results  reviewed;patient;spouse/significant other   OT Total Evaluation Time   OT Eval, Moderate Complexity Minutes (50164) 10   Therapy Certification   Medical Diagnosis s/p Anterior Cervical Diskectomy and Fusion with Anterior Plate Fixation Cervical 5-7 (2 Levels)   Start of Care Date 02/20/25   Certification date from 02/20/25   Certification date to 02/20/25   OT Goals   Therapy Frequency (OT) One time eval and treatment   OT Predicted Duration/Target Date for Goal Attainment 02/20/25   OT Goals Upper Body Dressing;Lower Body Dressing;Bed Mobility;Transfers;Toilet Transfer/Toileting   OT: Upper Body Dressing Independent;within precautions;Goal Met;Completed   OT: Lower Body Dressing Independent;within precautions;Goal Met;Completed   OT: Bed Mobility Independent;within precautions;Goal Met;Completed   OT: Transfer Independent;within precautions;Goal Met;Completed   OT: Toilet Transfer/Toileting Independent;within precautions;Completed;Goal Met   Interventions   Interventions Quick Adds Self-Care/Home Management;Therapeutic Activity   Self-Care/Home Management   Self-Care/Home Mgmt/ADL, Compensatory, Meal Prep Minutes (26265) 15   Symptoms Noted During/After Treatment (Meal Preparation/Planning Training) none   Treatment Detail/Skilled Intervention Pt supine in bed upon OT arrival and agreeable to therapy. Educ on s/p spinal precautions and soft collar. Educ on safe FB dressing including collar management and use of figure 4 tech for LB dressing. Pt IND UB dressing to doff gown, don shirt, doff/don collar while sitting on toilet. Pt IND LB dressing to don underwear and pants while sitting on toilet using figure 4 tech and standing IND. Educ on safe toileting. IND for toileting. Educ on safe showering. Educ on safe g/h at sink to complete oral cares with 2 cup method. Educ on safe kitch mob. Pt left sitting in chair with needs in reach and wife present.   Therapeutic Activities   Therapeutic Activity Minutes (58485) 15    Symptoms noted during/after treatment none   Treatment Detail/Skilled Intervention Focus on progressing func mob to promote safety and ind with ADLs. Educ on safe bed mob with log roll tech and positioning. IND supine <> sit using log roll tech and elevated height of bed to simulate home. Educ on safe transfers. IND STS, bed, chair and toilet transfers. Educ on safe tub and car transfers. Pt IND ambulated ~140' in estes and IND ascending/descending 3 steps w/o railing.   OT Discharge Planning   OT Plan DC OT   OT Discharge Recommendation (DC Rec) home with assist   OT Rationale for DC Rec Pt is near baseline with good support at home   OT Brief overview of current status IND   OT Total Distance Amb During Session (feet) 140   Total Session Time   Timed Code Treatment Minutes 30   Total Session Time (sum of timed and untimed services) 40   [facility]                                                                                   OUTPATIENT OCCUPATIONAL THERAPY    PLAN OF TREATMENT FOR OUTPATIENT REHABILITATION   Patient's Last Name, First Name, Lee Garrison YOB: 1980   Provider's Name   [facility]   Medical Record No.  [id number]     Onset Date: 02/19/25 Start of Care Date: 02/20/25     Medical Diagnosis:  s/p Anterior Cervical Diskectomy and Fusion with Anterior Plate Fixation Cervical 5-7 (2 Levels)               OT Diagnosis:  decreased ADL ind Certification Dates:  From: 02/20/25  To: 02/20/25     See note for plan of treatment, functional goals, and certification details.    I CERTIFY THE NEED FOR THESE SERVICES FURNISHED UNDER        THIS PLAN OF TREATMENT AND WHILE UNDER MY CARE (Physician co-signature of this document indicates review and certification of the therapy plan).

## 2025-02-20 NOTE — PLAN OF CARE
Physical Therapy: Orders received. Chart reviewed and discussed with care team.? Physical Therapy not indicated due to per OT pt is mobilizing independently and at his baseline. Pt has no mobility related concerns or need for IP PT.? Defer discharge recommendations to OT and medical team.? Will complete orders.

## 2025-02-20 NOTE — PROGRESS NOTES
"Orthopaedic Surgery Progress Note   02/20/2025    Subjective: No acute events overnight. Pain well controlled. Tolerating diet. Mild throat soreness. Denies dysphonia or dysphagia.  Voiding spontaneously. +Flatus, no BM. Denies fever or chills, CP, SOB, numbness or tingling, motor dysfunction or weakness.     Objective: /69 (BP Location: Left arm)   Pulse 53   Temp 98  F (36.7  C) (Oral)   Resp 18   Ht 1.753 m (5' 9\")   Wt 72.5 kg (159 lb 13.3 oz)   SpO2 97%   BMI 23.60 kg/m      General: NAD, alert and oriented, cooperative with exam   Respiratory: Non-labored breathing  Neuro: sensation grossly intact, reports hand numbness is resolved from pre-op.  Incision: clean, dry, intact with dressing. Drains with serosanguineous output.    Labs: No results found for this or any previous visit (from the past 24 hours).    Imaging: no XR ordered or needed at this time.     Assessment and Plan:   Patient is motivated to go home. He has not yet had a bowel movement and is motivated to work with OT. Drain removed with some resistance, but scant bleeding at the removal site.     [facility] Primary  Orthopedic Primary  Activity: Up with assist until independent. No excessive bending or twisting. No lifting >10 lbs x 6 weeks. Encourage ambulation. If Rei lift needed, use high back sling.   Weight bearing status: WBAT.  Pain management:   hydromorphone PO, IV dilaudid prn, APAP, muscle relaxants. No NSAIDs    Diet: Begin with clear fluids and progress diet as tolerated.   DVT prophylaxis: SCDs only. No chemical DVT ppx needed.  Labs: Hgb POD# 1.  Bracing/Splinting: None.  Dressings: Keep dressing c/d/i x 7 days  Drains: Document output per shift, will be discontinued at Orthopedic Surgery discretion.  Wong catheter: Remove POD#1 or POD #2 if intrathecal morphine given.  Physical Therapy/Occupational Therapy: Eval and treat.  Cultures: none.    Consults: Hospitalist.  Follow-up: Clinic with Dr. Nixon in 6 weeks with " repeat x-rays.   Disposition: Pending progress with therapies, pain control on orals, and medical stability, anticipate discharge to home with assist on POD #1.    ABDI Osullivan  Orthopaedic Surgery

## 2025-02-20 NOTE — PLAN OF CARE
Goal Outcome Evaluation:      Plan of Care Reviewed With: patient    Overall Patient Progress: improvingOverall Patient Progress: improving    Outcome Evaluation: No acute changes during shift. No complaints of numbness or tingling in extremities. Strength and movement appropriate. Continues to report moderate-severe pain managed with PRN medication per MAR. VALERIE drain continues to have no output.

## 2025-02-20 NOTE — PROGRESS NOTES
Patient Carmelo Mckenzie  1980 was admitted on 25 (date of surgery) for post op care after a cervical fusion surgery. He was discharged on 25.     He is not to lift greater than 10 lbs, turn or bend neck repeatedly x the next 6 weeks.     He will return 6 weeks post op to clinic for evaluation and further assessment.   If there are additional questions regarding limitations, please contact Dr. Nixon's office at [phone].    Sincerely,  ABDI Osullivan  [facility]

## 2025-02-20 NOTE — DISCHARGE SUMMARY
ORTHOPEDIC SURGERY DISCHARGE SUMMARY     Date of Admission: 2/19/2025  Date of Discharge: 2/20/2025  2:00 PM  Disposition: Home  Staff Physician: No att. providers found  Primary Care Provider: Jose Santana    DISCHARGE DIAGNOSIS:  Cervical spondylosis with radiculopathy [M47.22]    PROCEDURES: Procedure(s):  Anterior Cervical Diskectomy and Fusion with Anterior Plate Fixation Cervical 5-7 (2 Levels);  on 2/19/2025    BRIEF HISTORY:  This is a 44 year old patient who has been followed in clinic. Please refer to that documentation for full details. Briefly, the patient has Cervical myelopathy. The patient has failed conservative treatment of symptoms and desires more definitive intervention. Therefore, after reviewing non-operative and operative options including the risks and benefits associated with each, the patient elected to proceed with the above stated procedure.     HOSPITAL COURSE:    The patient was admitted following the above listed procedures for pain control and rehabilitation. Lee Mckenzie did well post-operatively. Medicine was consulted post operatively to aid in management of medical co-morbidities. The patient received routine nursing cares and at the time of discharge was medically stable. Vital signs were stable throughout admission. The patient is tolerating a regular diet and is voiding spontaneously. All PT/OT goals have been met for safe mobility. Pain is now controlled on oral medications which will be available on discharge. Stool softeners have been used while taking pain medications to help prevent constipation. Lee Mckenzie is deemed medically safe to discharge.     Antibiotics:  Ancef given periop and 24 hours postop.  DVT prophylaxis:  SCD  PT Progress:  Has met PT/OT goals for safe mobility.   Pain Meds:  Weaned off all IV pain meds by discharge.  Inpatient Events:  No significant postoperative events or complications.     PHYSICAL EXAM:    General: NAD, alert and oriented,  cooperative with exam   Respiratory: Non-labored breathing  Neuro: sensation grossly intact, reports hand numbness is resolved from pre-op.  Incision: clean, dry, intact with dressing. Drains with serosanguineous output.    FOLLOWUP:        Future Appointments   Date Time Provider Department Center   4/15/2025  9:00 AM Charlie Nixon MD [facility]       Orthopedic Surgery appointments are at the [facility] ([address] [place] [address]). Call [phone] to schedule a follow-up appointment at this location with your provider.     PLANNED DISCHARGE ORDERS:      Discharge Medication List as of 2/20/2025  1:27 PM        START taking these medications    Details   gabapentin (NEURONTIN) 100 MG capsule Take 1 capsule (100 mg) by mouth 3 times daily., Disp-90 capsule, R-0, E-Prescribe      HYDROmorphone (DILAUDID) 2 MG tablet Take 1-2 tablets (2-4 mg) by mouth every 4 hours as needed for moderate to severe pain., Disp-60 tablet, R-0, E-Prescribe      hydrOXYzine HCl (ATARAX) 25 MG tablet Take 1 tablet (25 mg) by mouth every 6 hours as needed for other (adjuvant pain)., Disp-25 tablet, R-0, E-Prescribe      methocarbamol (ROBAXIN) 750 MG tablet Take 1 tablet (750 mg) by mouth every 6 hours as needed for muscle spasms., Disp-40 tablet, R-0, E-Prescribe      naloxegol (MOVANTIK) 25 MG TABS tablet Take 1 tablet (25 mg) by mouth every morning (before breakfast) for 10 days., Disp-10 tablet, R-0, E-Prescribe      Nutritional Supplements (JOE) PACK Take 1 packet by mouth 2 times daily (with meals) for 14 days., Disp-28 packet, R-0, E-Prescribe      polyethylene glycol (MIRALAX) 17 GM/Dose powder Take 17 g by mouth daily., Disp-510 g, R-0, E-Prescribe      senna-docusate (SENOKOT-S/PERICOLACE) 8.6-50 MG tablet Take 1 tablet by mouth 2 times daily., Disp-25 tablet, R-0, E-Prescribe           CONTINUE these medications which have NOT CHANGED    Details   acetaminophen (TYLENOL)  "325 MG tablet Take 650 mg by mouth every 6 hours as needed for mild pain., Historical           STOP taking these medications       Naproxen Sodium (ALEVE PO) Comments:   Reason for Stopping:                 Discharge Procedure Orders   No driving or operating machinery while in a cervical collar   Order Comments: Until follow-up appointment.     Reason for your hospital stay   Order Comments: ACDF     Brief Discharge Instructions   Order Comments: Post-operative Discharge Instructions:     WOUND CARE:  You have a dressing on your incision which can be worn for up to 5 days, and it can be worn in the shower. After the dressing has been removed, you do not need a dressing. There is \"surgical glue\" directly over the incision. This will fall off on its own, which can take up to 2 weeks. It is okay to shower and wash gently with soap and water. Do not soak in the bath. No pools, hot tubs, or lakes for 6 weeks.      After surgery, you may have a sensitive scar.  When the incision has fully healed, you may massage the scar to decrease sensitivity and help break down scar tissue. Do this up to 4 times per day.     DIET & EXERCISE:  - When you get home, you may resume your normal diet as tolerated. You may not be very hungry but try to eat small healthy meals to help you heal. Remember to drink plenty of water/fluids to help keep you hydrated.     - You will be seen in the clinic at 6 weeks following surgery. You will not need to attend physical therapy during this time. You can focus on cardiovascular fitness by walking as much as you can tolerate. Avoid bending, lifting, and twisting. Your weight lifting restriction is 10 pounds until your first follow-up appointment.       PAIN MEDICATIONS:  For postoperative pain control, we have prescribed a variety of medications. Tylenol has been prescribed and should be taken as part of the complete pain management regimen. You may also have been prescribed a muscle relaxer to be " taken as needed for muscle spasms. Other pain management techniques include icing the surgical area (for 15 minutes on, 15 minutes off) throughout the day to help with inflammation. Avoid NSAIDs (ibuprofen, Aleve, Advil, etc) for the first 12 weeks after surgery, unless approved by your surgeon.     You also received a prescription for a opioid medication.  This should be taken for the first few weeks following surgery.  As pain improves, decrease the amount you take (see tapering plan below). Opioid have numerous side effects including nausea, constipation, and drowsiness. If you experience nausea, this may be relieved by taking the medication with food or a light meal. To avoid constipation, please use an over-the-counter stool softeners and drink lots of water and eat fruits and vegetables. No operating heavy machinery or driving an automobile while on opioid medications.        Tapering opioids: As your pain symptoms improve, focus your efforts on decreasing (tapering) use of opioid medications. The most successful tapering strategy is to first, decrease the number of tablets you take every 4-6 hours to the minimum prescribed. Then, increase the amount of time between doses.  For example:  First, taper to   or 1 tablet every 4-6 hours.  Then, taper to   or 1 tablet every 6-8 hours.  Then, taper to   or 1 tablet every 8-10 hours.  Then, taper to   or 1 tablet every 10-12 hours.  Then, taper to   or 1 tablet at bedtime.  The bedtime dose can help with comfort during sleep and is typically the last dose to be discontinued after surgery.     Call the orthopedic clinic at [phone] several business days in advance of when you need a refill. Early refills will not be provided, and you may not take more than what is prescribed. Inform the nurse how much of the medication you are taking and how many tablets you have left.     WHEN TO CALL:  Please call or return if you experience the following:  - Fevers (temperature  "greater than 100.4 degrees Fahrenheit).  - Pain that is getting worse or does not respond to pain medications.  - Drainage from your wound.  - Increasing redness about the wound.  - Changes in strength or sensation to your arms/legs.   - Any other worrisome symptoms.     You may reach the clinic by dialing [phone].  After hours, you may reach the resident on call by dialing [phone].      When to call - Contact Surgeon Team   Order Comments: You may experience symptoms that require follow-up before your scheduled appointment. Refer to the \"Stoplight Tool\" for instructions on when to contact your Surgeon Team if you are concerned about pain control, blood clots, constipation, or if you are unable to urinate.     When to call - Reach out to Urgent Care   Order Comments: If you are not able to reach your Surgeon Team and you need immediate care, go to the Orthopedic Walk-in Clinic or Urgent Care at your Surgeon's office.  Do NOT go to the Emergency Room unless you have shortness of breath, chest pain, or other signs of a medical emergency.     When to call - Reasons to Call 911   Order Comments: Call 911 immediately if you experience sudden-onset chest pain, arm weakness/numbness, slurred speech, or shortness of breath     Discharge Instruction - Breathing exercises   Order Comments: Perform breathing exercises using your Incentive Spirometer 10 times per hour while awake for 2 weeks.     Symptoms - Fever Management   Order Comments: A low grade fever can be expected after surgery.  Use acetaminophen (TYLENOL) as needed for fever management.  Contact your Surgeon Team if you have a fever greater than 101.5 F, chills, and/or night sweats.     Symptoms - Constipation management   Order Comments: Constipation (hard, dry bowel movements) is expected after surgery due to the combination of being less active, the anesthetic, and the opioid pain medication.  You can do the following to help reduce constipation:  ~  " FLUIDS:  Drink clear liquids (water or Gatorade), or juice (apple/prune).  ~  DIET:  Eat a fiber rich diet.    ~  ACTIVITY:  Get up and move around several times a day.  Increase your activity as you are able.  MEDICATIONS:  Reduce the risk of constipation by starting medications before you are constipated.  You can take Miralax   (1 packet as directed) and/or a stool softener (Senokot 1-2 tablets 1-2 times a day).  If you already have constipation and these medications are not working, you can get magnesium citrate and use as directed.  If you continue to have constipation you can try an over the counter suppository or enema.  Call your Surgeon Team if it has been greater than 3 days since your last bowel movement.     Symptoms - Reduced Urine Output   Order Comments: Changes in the amount of fluids you drank before and after surgery may result in problems urinating.  It is important to stay well-hydrated after surgery and drink plenty of water. If it has been greater than 8 hours since you have urinated despite drinking plenty of water, call your Surgeon Team.     Activity - Exercises to prevent blood clots   Order Comments: Unless otherwise directed by your Surgeon team, perform the following exercises at least three times per day for the first four weeks after surgery to prevent blood clots in your legs: 1) Point and flex your feet (Ankle Pumps), 2) Move your ankle around in big circles, 3) Wiggle your toes, 4) Walk, even for short distances, several times a day, will help decrease the risk of blood clots.     Order Specific Question Answer Comments   Is discharge order? Yes      Comfort and Pain Management - Pain after Surgery   Order Comments: Pain after surgery is normal and expected.  You will have some amount of pain for several weeks after surgery.  Your pain will improve with time.  There are several things you can do to help reduce your pain including: rest, ice, elevation, and using pain medications as  needed. Contact your Surgeon Team if you have pain that persists or worsens after surgery despite rest, ice, elevation, and taking your medication(s) as prescribed. Contact your Surgeon Team if you have new numbness, tingling, or weakness in your operative extremity.     Comfort and Pain Management - Swelling after Surgery   Order Comments: Swelling and/or bruising of the surgical extremity is common and may persist for several months after surgery. In addition to frequent icing and elevation, gentle compressive support with an ACE wrap or tubigrip may help with swelling. Apply compression regularly, removing at least twice daily to perform skin checks. Contact your Surgeon Team if your swelling increases and is NOT associated with an increase in your activity level, or if your swelling increases and is associated with redness and pain.     Comfort and Pain Management - Cold therapy   Order Comments: Ice can be used to control swelling and discomfort after surgery. Place a thin towel over your operative site and apply the ice pack overtop. Leave ice pack in place for 20 minutes, then remove for 20 minutes. Repeat this 20 minutes on/20 minutes off routine as often as tolerated.     Medication Instructions - Acetaminophen (TYLENOL) Instructions   Order Comments: You were discharged with acetaminophen (TYLENOL) for pain management after surgery. Acetaminophen most effectively manages pain symptoms when it is taken on a schedule without missing doses (every four, six, or eight hours). Your Provider will prescribe a safe daily dose between 3000 - 4000 mg.  Do NOT exceed this daily dose. Most patients use acetaminophen for pain control for the first four weeks after surgery.  You can wean from this medication as your pain decreases.     Medication Instructions - NSAID Instructions   Order Comments: Do not use NSAIDs x 12 weeks. Some common anti-inflammatories include: ibuprofen (ADVIL, MOTRIN), naproxen (ALEVE, NAPROSYN),  celecoxib (CELEBREX), meloxicam (MOBIC), ketorolac (TORADOL).     Medication Instructions - Muscle relaxant Medication Instructions   Order Comments: You were discharged with a muscle relaxer medication that can be used in conjunction with acetaminophen (TYLENOL) and the opioid medication to manage pain symptoms. Take the muscle relaxant medication exactly as directed.     Follow Up Care   Order Comments: Follow-up with your Surgeon Team in 6 weeks for wound check.     Medication instructions - No pharmacologic VTE prophylaxis prescribed   Order Comments: Your Surgeon did not prescribe medication for anticoagulation.     Opioid Instructions (Less than 65 years)   Order Comments: You were discharged with an opioid medication (hydromorphone, oxycodone, hydrocodone, or tramadol). This medication should only be taken for breakthrough pain that is not controlled with acetaminophen (TYLENOL). If you rate your pain less than 3 you do not need this medication. Pain rating 0-3: You do not need this medication. Pain rating 4-6: Take 1 tablet every 4-6 hours as needed Pain rating 7-10: Take 2 tablets every 4-6 hours as needed. Do not exceed 6 tablets per day     Medication Instructions - Opioids - Tapering Instructions   Order Comments: In the first three days following surgery, your symptoms may warrant use of the narcotic pain medication every four to six hours as prescribed. This is normal. As your pain symptoms improve, focus your efforts on decreasing (tapering) use of narcotic medications. The most successful tapering strategy is to first, decrease the number of tablets you take every 4-6 hours to the minimum prescribed. Then, increase the amount of time between doses. For example: First, taper to   or 1 tablet every 4-6 hours. Then, taper to   or 1 tablet every 6-8 hours. Then, taper to   or 1 tablet every 8-10 hours. Then, taper to   or 1 tablet every 10-12 hours. Then, taper to   or 1 tablet at bedtime. The bedtime  dose can help with comfort during sleep and is typically the last dose to be discontinued after surgery.     Discharge Instruction - Regular Diet Adult   Order Comments: Return to your pre-surgery diet unless instructed otherwise     Order Specific Question Answer Comments   Is discharge order? Yes        Orthopedic surgery staff for this patient is Dr. Nixon. Discussed.        --  Meir Mckenna MD  Spine Fellow

## 2025-02-20 NOTE — PLAN OF CARE
Pt. discharged at 1400 to home, and left with personal belongings. Pt. received complete discharge paperwork and medications were sent to preferred pharmacy. Pt. was given times of last dose for all discharge medications in writing on discharge medication sheets. Discharge teaching included  medications, pain management, activity restrictions, dressing changes, and signs and symptoms of infection. Dressing supplies sent home. Pt. to follow up with Dr. Nixon in 6 weeks. Pt. had no further questions at the time of discharge and no unmet needs were identified.        Plan of Care Reviewed With: patient, spouse    Overall Patient Progress: improving

## 2025-02-24 DIAGNOSIS — M54.12 CERVICAL RADICULOPATHY: ICD-10-CM

## 2025-02-24 RX ORDER — POLYETHYLENE GLYCOL 3350 17 G/17G
17 POWDER, FOR SOLUTION ORAL DAILY
Qty: 510 G | Refills: 2 | Status: SHIPPED | OUTPATIENT
Start: 2025-02-24

## 2025-02-24 RX ORDER — METHOCARBAMOL 750 MG/1
750 TABLET, FILM COATED ORAL EVERY 6 HOURS PRN
Qty: 40 TABLET | Refills: 3 | Status: SHIPPED | OUTPATIENT
Start: 2025-02-24

## 2025-02-24 RX ORDER — HYDROMORPHONE HYDROCHLORIDE 2 MG/1
2-4 TABLET ORAL EVERY 4 HOURS PRN
Qty: 74 TABLET | Refills: 0 | Status: SHIPPED | OUTPATIENT
Start: 2025-02-25 | End: 2025-03-04

## 2025-02-24 RX ORDER — GABAPENTIN 100 MG/1
100 CAPSULE ORAL 3 TIMES DAILY
Qty: 90 CAPSULE | Refills: 1 | Status: SHIPPED | OUTPATIENT
Start: 2025-02-24

## 2025-02-24 RX ORDER — HYDROXYZINE HYDROCHLORIDE 25 MG/1
25 TABLET, FILM COATED ORAL EVERY 6 HOURS PRN
Qty: 25 TABLET | Refills: 3 | Status: SHIPPED | OUTPATIENT
Start: 2025-02-24

## 2025-02-24 RX ORDER — AMOXICILLIN 250 MG
1 CAPSULE ORAL 2 TIMES DAILY
Qty: 25 TABLET | Refills: 2 | Status: SHIPPED | OUTPATIENT
Start: 2025-02-24

## 2025-02-24 NOTE — TELEPHONE ENCOUNTER
See phone message from pt for refill of Dilaudid 2mg.  Discharged  2-20-25 with #60 tabs ordered 1-2 Q4H prn.  I called pt rates pan 6-7 & Taking 2 Q4H has #18 left so will run out tomorrow 2-25-25.  DOS 2-19-25 ACDF.    Taking Gabapentin , Atarax , Robaxin & OTC Tylenol 1,000mg Q6H.  Having Bms & taking Senna & Miralax.  Wearing Brace as ordered & using ice.    Refilled Dilaudid, Gabapentin, Atarax, Robaxin, Senna & Miralax.  Call back prn.   Pt agreed.  S.O./Jessie Rodriges RN.

## 2025-02-24 NOTE — TELEPHONE ENCOUNTER
Medication Refill Request    Has the patient contacted the pharmacy for the refill? Yes   Name of medication being requested: HYDROmorphone (DILAUDID) 2 MG tablet   Provider who prescribed the medication:   Pharmacy: [id number] - [place], [place] - [address]   Date medication is needed: 2/24       Could we send this information to you in [place] or would you prefer to receive a phone call?:   Patient would prefer a phone call   Okay to leave a detailed message?: Yes at Cell number on file:    Telephone Information:   Mobile [phone]

## 2025-02-24 NOTE — TELEPHONE ENCOUNTER
Dilaudid 2-4mg q4hrs prescribed for 7 days to start 2/25/25. Max of 12 tabs for 2 days then max of 10 tabs for 5 days. Pdmp reviewed.    Bishop ADELA Cancino PA-C

## 2025-02-26 ENCOUNTER — TELEPHONE (OUTPATIENT)
Dept: ORTHOPEDICS | Facility: CLINIC | Age: 45
End: 2025-02-26
Payer: COMMERCIAL

## 2025-02-28 NOTE — TELEPHONE ENCOUNTER
Retail Pharmacy Prior Authorization Team   Phone: [phone]    PA Initiation    Medication: SENNOSIDES-DOCUSATE SODIUM 8.6-50 MG PO TABS  Insurance Company: [employer] - Phone [phone] Fax [phone]  Pharmacy Filling the Rx: [id number] - [place], [place] - [address]  Filling Pharmacy Phone: [phone]  Filling Pharmacy Fax:    Start Date: 2/28/2025      Note: Due to record-high volumes, our turn-around time is taking longer than usual . We are currently 4  business days behind in the pools.   We are working diligently to submit all requests in a timely manner and in the order they are received. Please only flag TRUE URGENT requests as high priority to the pool at this time.   If you have questions on status of PA's,  please send a note/message in the active PA encounter and send back to the [facility] PA pool [[phone]].    If you have questions about the turn-around time or about our process, please reach out to our supervisor Kait Tijerina.   Thank you!   RPPA (Retail Pharmacy Prior Authorization) team

## 2025-03-04 ENCOUNTER — DOCUMENTATION ONLY (OUTPATIENT)
Dept: ORTHOPEDICS | Facility: CLINIC | Age: 45
End: 2025-03-04
Payer: COMMERCIAL

## 2025-03-04 NOTE — TELEPHONE ENCOUNTER
I called & told pt that insurance denied PA for Senna but pt can get it OTC prn.    Pt stated bowels working fine but will get OTC prn.  Stated doing well.  Call back prn. Pt agreed.  Jessie Rodriges RN.

## 2025-03-04 NOTE — TELEPHONE ENCOUNTER
Retail Pharmacy Prior Authorization Team   Phone: [phone]    PRIOR AUTHORIZATION DENIED    Medication: SENNOSIDES-DOCUSATE SODIUM 8.6-50 MG PO TABS  Insurance Company: [employer] - Phone [phone] Fax [phone]  Denial Date: 3/4/2025  Denial Reason(s): OTC'S NOT COVERED    Appeal Information:

## 2025-03-04 NOTE — PROGRESS NOTES
Received Completed forms Yes   Faxed Forms Faxed To: [employer]  Fax Number: [phone]   Sent to HIM (Date) 3/4/25

## 2025-03-19 DIAGNOSIS — M47.22 CERVICAL SPONDYLOSIS WITH RADICULOPATHY: ICD-10-CM

## 2025-03-19 DIAGNOSIS — M54.12 CERVICAL RADICULOPATHY: Primary | ICD-10-CM

## 2025-03-19 DIAGNOSIS — Z98.890 H/O CERVICAL SPINE SURGERY: ICD-10-CM

## 2025-03-26 ENCOUNTER — MYC MEDICAL ADVICE (OUTPATIENT)
Dept: ORTHOPEDICS | Facility: CLINIC | Age: 45
End: 2025-03-26
Payer: COMMERCIAL

## 2025-03-26 ENCOUNTER — TELEPHONE (OUTPATIENT)
Dept: ORTHOPEDICS | Facility: CLINIC | Age: 45
End: 2025-03-26
Payer: COMMERCIAL

## 2025-03-26 DIAGNOSIS — T81.30XA WOUND DEHISCENCE: ICD-10-CM

## 2025-03-26 DIAGNOSIS — Z98.1 S/P CERVICAL SPINAL FUSION: Primary | ICD-10-CM

## 2025-03-26 NOTE — TELEPHONE ENCOUNTER
Patient Returning Call    Reason for call:  Patient at ER at [facility] in [place]   Per [place]: Cellulitis at surgical site no underlying abscess and no fever. Need care team or Dr. Nixon to call back please.   Information relayed to patient:  n/a    Patient has additional questions:  No      Could we send this information to you in [place] or would you prefer to receive a phone call?:   Patient would prefer a phone call   Okay to leave a detailed message?: Yes at Other phone number:  [place] [phone]

## 2025-03-27 NOTE — CONFIDENTIAL NOTE
Called patient to answer the questions that he had for our team when they attempted to make the appointment for tomorrow.  Yes, he should continue with the antibiotics that were prescribed.  He should be doing twice daily dressing changes, or change dressing if it becomes soiled/wet.  He can use gauze with tape along the edges.    We then discussed his recent emergency department visit, he said he was seen at [facility] in [place], [place].  Reports that they jerrod blood, did a CT scan with contrast, did a swab culture of the wound.  We will try to get these records prior to his visit tomorrow.  We need him to get updated labs tomorrow as well, and he is understanding of this.    Sima Brice PA-C

## 2025-03-27 NOTE — TELEPHONE ENCOUNTER
See My Chart messages from pt. After ER [facility] yesterday & replies.    I called pt back again & told pt that yes we do need labs tomorrow before appt because ER did not do ESR & CRP infection labs & pt agreed & we verified Lab time.  Call back prn.  Pt agreed.  Jessie Rodriges RN.

## 2025-03-27 NOTE — TELEPHONE ENCOUNTER
See phone message from ER at 1630 last night.  Taken care of in separate encounter-set up appt. For tomorrow 3-27-25 with Bishop CARIG.  Jessie Rodriges RN.

## 2025-03-27 NOTE — TELEPHONE ENCOUNTER
Patient confirmed scheduled appointment:  Date: 3/28/25  Time: 10:00am  Visit type: Post Op Spine  Provider: Bishop Cancino  Location: [facility]    Patient wondering if they should keep the bandaid on in the meantime? And if they should keep taking Cephalexin? Writer will talk to Jerel and have them reach out to patient.

## 2025-03-27 NOTE — TELEPHONE ENCOUNTER
Spoke with patient to schedule labs. Patient wants to know if they can use labs from ER yesterday. Writer will talk to Sima and get back to patient. Patient agreed.

## 2025-03-28 ENCOUNTER — OFFICE VISIT (OUTPATIENT)
Dept: ORTHOPEDICS | Facility: CLINIC | Age: 45
End: 2025-03-28
Payer: COMMERCIAL

## 2025-03-28 DIAGNOSIS — L08.9 WOUND INFECTION: Primary | ICD-10-CM

## 2025-03-28 DIAGNOSIS — T14.8XXA WOUND INFECTION: Primary | ICD-10-CM

## 2025-03-28 PROCEDURE — 99024 POSTOP FOLLOW-UP VISIT: CPT | Performed by: PHYSICIAN ASSISTANT

## 2025-03-28 NOTE — LETTER
3/28/2025      Lee Mckenzie  [address] [facility] [address]      Dear Colleague,    Thank you for referring your patient, Lee Mckenzie, to the [facility] [place]. Please see a copy of my visit note below.    Reason for Visit:  Chief Complaint   Patient presents with     RECHECK     Incision check, ok to schedule per Jerel     S>    He states that his wound started draining 1 week ago and was seen at the ED 2 days ago.  Was started on Keflex.  Denies any fevers chills or night sweats.  Believes that the drainage has not increased.  Keeping his incision covered daily with as needed dressing changes.    Neck Disability Index (NDI) Questionnaire        11/26/2024    11:21 AM   Neck Disability Index (NDI)   Neck Disability Index: Count 9   NDI: Total Score = SUM (points for all 10 findings) Incomplete   Neck Disability in Percent = (Total Score) / 50 * 100 Incomplete      2/19/2025 C5-7 ACDF  Preop NDI   31.12%     Visual Analog Pain Scale  Back Pain Scale 0-10: 4 (lower and upperback pain)  Right leg pain: 0  Left leg pain: 0  Neck Pain Scale 0-10: 4 (front left side neck pain)  Right arm pain: 0  Left arm pain: 0    O>   Alert, oriented x 3, cooperative.  Not in CP distress.  There were no vitals taken for this visit.  Surgical incision erythematous with fluctuance.  I removed 2 Vicryl sutures that were exposed.  I was able to express purulent drainage.        Ambulates independently.   Grossly neurologically intact.    Imaging:   No new imaging    A>  44-year-old male 2 weeks status post C5-7 ACDF with suture abscess.    P>   He has a suture abscess that was draining with Purulent drainage.  I cut off the sutures that were exposed after prepping the incision thoroughly with Betadine. I expressed a good amount of pus from the incision. I recommended to continue the Keflex that was prescribed. His ESR and CRP were normal. Educated to call if symptoms worsen or  change.  He will send an updated picture of his wound mid next week.    Bishop ADELA Cancino PA-C        Again, thank you for allowing me to participate in the care of your patient.        Sincerely,        Bishop ADELA Cancino PA-C    Electronically signed

## 2025-03-28 NOTE — PROGRESS NOTES
Reason for Visit:  Chief Complaint   Patient presents with    RECHECK     Incision check, ok to schedule per Jerel     S>    He states that his wound started draining 1 week ago and was seen at the ED 2 days ago.  Was started on Keflex.  Denies any fevers chills or night sweats.  Believes that the drainage has not increased.  Keeping his incision covered daily with as needed dressing changes.    Neck Disability Index (NDI) Questionnaire        11/26/2024    11:21 AM   Neck Disability Index (NDI)   Neck Disability Index: Count 9   NDI: Total Score = SUM (points for all 10 findings) Incomplete   Neck Disability in Percent = (Total Score) / 50 * 100 Incomplete      2/19/2025 C5-7 ACDF  Preop NDI   31.12%     Visual Analog Pain Scale  Back Pain Scale 0-10: 4 (lower and upperback pain)  Right leg pain: 0  Left leg pain: 0  Neck Pain Scale 0-10: 4 (front left side neck pain)  Right arm pain: 0  Left arm pain: 0    O>   Alert, oriented x 3, cooperative.  Not in CP distress.  There were no vitals taken for this visit.  Surgical incision erythematous with fluctuance.  I removed 2 Vicryl sutures that were exposed.  I was able to express purulent drainage.        Ambulates independently.   Grossly neurologically intact.    Imaging:   No new imaging    A>  44-year-old male 2 weeks status post C5-7 ACDF with suture abscess.    P>   He has a suture abscess that was draining with Purulent drainage.  I cut off the sutures that were exposed after prepping the incision thoroughly with Betadine. I expressed a good amount of pus from the incision. I recommended to continue the Keflex that was prescribed. His ESR and CRP were normal. Educated to call if symptoms worsen or change.  He will send an updated picture of his wound mid next week.    Bishop ADELA Cancino PA-C

## 2025-04-15 ENCOUNTER — OFFICE VISIT (OUTPATIENT)
Dept: ORTHOPEDICS | Facility: CLINIC | Age: 45
End: 2025-04-15
Payer: COMMERCIAL

## 2025-04-15 ENCOUNTER — ANCILLARY PROCEDURE (OUTPATIENT)
Dept: GENERAL RADIOLOGY | Facility: CLINIC | Age: 45
End: 2025-04-15
Attending: ORTHOPAEDIC SURGERY
Payer: COMMERCIAL

## 2025-04-15 DIAGNOSIS — Z98.890 H/O CERVICAL SPINE SURGERY: ICD-10-CM

## 2025-04-15 DIAGNOSIS — M54.12 CERVICAL RADICULOPATHY: ICD-10-CM

## 2025-04-15 DIAGNOSIS — M47.22 CERVICAL SPONDYLOSIS WITH RADICULOPATHY: ICD-10-CM

## 2025-04-15 DIAGNOSIS — Z98.1 S/P CERVICAL SPINAL FUSION: Primary | ICD-10-CM

## 2025-04-15 PROCEDURE — 72040 X-RAY EXAM NECK SPINE 2-3 VW: CPT | Mod: GC | Performed by: RADIOLOGY

## 2025-04-15 PROCEDURE — 99024 POSTOP FOLLOW-UP VISIT: CPT | Performed by: ORTHOPAEDIC SURGERY

## 2025-04-15 NOTE — PROGRESS NOTES
In-Person Visit    Spine Surgical Hx:  11/15/2018 - ALIF-PPS L5-S1; use of Infuse BMP Small kit; MIS R hemilaminectomy-diskectomy L5-S1 (Tiffani/Wilber) for DDD with R posterolateral HNP.  [Implants: NuVasive Base titanium cage; Medtronic Voyager perc screw system; Medtronic METRx MIS system].  02/19/2025 - 2-level ACDF with ant plate fixation C5-C7 using structural allograft (Sembrano) for spondylosis with R C7 radiculopathy.  [Implants: Medtronic Zevo plate; Medtronic anatomic corticocancellous allograft [id number]].      Chief Complaint   Patient presents with    RECHECK     F/U SAW  3-28-25 FOR INCISION CHECK [facility]  DOS: 2/19/25 // Anterior cervical diskectomy and fusion with anterior plate fixation cervical 5-7 (2 levels); Right or Left anterior iliac crest bone graft harvest          S>  44 year old male, RHD, 2 mos postop; last seen at 6 wks (3/28/25) at Spine DEB clinic ([facility]) for wound drainage x 1 wk.    Accompanied by wife.  Anterior neck wound has stopped draining; last day of drainage was last Saturday.  Preop nekc and R arm pain improved.  STill with some postsurgical neck pain but also seems improving.    Off opioids; takes tylenol.  Quit smoking prior to surgery.    Oswestry (LAURYN) Questionnaire        4/15/2025     8:26 AM   OSWESTRY DISABILITY INDEX   Count 10    Sum 21    Oswestry Score (%) 42 %        Patient-reported      S/p ALIF-PPS L5-S1 (11/15/18):  Preop LAURYN 44%  6wks                  40%  3mo                   5%  6mo                   6.67%  1yr                     27.5%  6yr                     28%      Neck Disability Index (NDI) Questionnaire        4/15/2025     8:34 AM   Neck Disability Index (NDI)   Neck Disability Index: Count 10   NDI: Total Score = SUM (points for all 10 findings) 25   Neck Disability in Percent = (Total Score) / 50 * 100 50 (%)      S/p 2-level ACDF C5-C7 (2/19/25):  NDI  preop 31.12%  2mo  50%      Visual Analog Pain Scale  Back Pain Scale 0-10: 4 (low back pain)  Right leg pain: 0  Left leg pain: 0  Neck Pain Scale 0-10: 3 (left side neck pain)  Right arm pain: 0  Left arm pain: 0    PROMIS-10 Scores  Global Mental Health Score: (Patient-Rptd) (P) 8  Global Physical Health Score: (Patient-Rptd) (P) 10  PROMIS TOTAL - SUBSCORES: (Patient-Rptd) (P) 18    O>   Alert, oriented x 3, cooperative.  Not in CP distress.  There were no vitals taken for this visit.  Surgical incision(s) well-healed, no sign of infection.  Ambulates independently.  Grossly neurologically intact both upper extremities.  Motor and sensory intact.    Imaging:    Cervical AP lateral upright x-rays taken today show stable two-level ACDF with allograft spacers and anterior plate fixation C5-C7.  No sign of loosening or failure.  Overall, reassuring x-rays.    A>   44/m RHD with:  Cervical spine:  1.  2 mos s/p 2-level ACDF C5-C7 (2/19/25) for advanced spondylosis C5-C7 with Right C7 radiculopathy; with improved preoperative symptoms.  Lumbar spine:  1.  6.5 yrs s/p ALIF-PPS L5-S1 (11/15/18) with solid arthrodesis.  Other:  1.  Chronic active smoking (~ 1 ppd); still smoking as of 11/26/24; quit smoking prior to February 2025 cervical spine fusion surgery.    P>    Reassured patient regarding my clinical and radiographic findings.  I am happy to hear that his preoperative neck and right arm pain are improved.  I reassured him that at 6 weeks, he could still look forward to a lot more improvement.  I am also happy to hear that the incision has continued to heal, today, it is already deemed fully healed, no sign of infection.  At this point, we discussed the option of postoperative PT; he is amenable.    - PT order placed.  - Work letter written and printed today.  Given patient's line of work, I do not think he is yet able to return to work at this time.  Will likely be cleared to return to work at his next  visit.    Return to clinic in 6 weeks (3 months postoperative) c/o spine DEB clinic, with repeat cervical lateral x-ray.    Charlie Nixon MD    Orthopaedic Spine Surgery  Dept Orthopaedic Surgery, [facility]  [phone] office, [phone] pager  [email or web address]

## 2025-04-15 NOTE — LETTER
4/15/2025      [facility]  [address] [address]      Dear Colleague,    Thank you for referring your patient, Lee Mckenzie, to the [facility] [place]. Please see a copy of my visit note below.    In-Person Visit    Spine Surgical Hx:  11/15/2018 - ALIF-PPS L5-S1; use of Infuse BMP Small kit; MIS R hemilaminectomy-diskectomy L5-S1 (Sembrano/Wilber) for DDD with R posterolateral HNP.  [Implants: NuVasive Base titanium cage; Medtronic Voyager perc screw system; Medtronic METRx MIS system].  02/19/2025 - 2-level ACDF with ant plate fixation C5-C7 using structural allograft (Sembrano) for spondylosis with R C7 radiculopathy.  [Implants: Medtronic Zevo plate; Medtronic anatomic corticocancellous allograft [id number]].      Chief Complaint   Patient presents with     RECHECK     F/U [facility] [place] 3-28-25 FOR INCISION CHECK [facility]  DOS: 2/19/25 // Anterior cervical diskectomy and fusion with anterior plate fixation cervical 5-7 (2 levels); Right or Left anterior iliac crest bone graft harvest          S>  44 year old male, RHD, 2 mos postop; last seen at 6 wks (3/28/25) at Spine DEB clinic ([facility]) for wound drainage x 1 wk.    Accompanied by wife.  Anterior neck wound has stopped draining; last day of drainage was last Saturday.  Preop nekc and R arm pain improved.  STill with some postsurgical neck pain but also seems improving.    Off opioids; takes tylenol.  Quit smoking prior to surgery.    Oswestry (LAURYN) Questionnaire        4/15/2025     8:26 AM   OSWESTRY DISABILITY INDEX   Count 10    Sum 21    Oswestry Score (%) 42 %        Patient-reported      S/p ALIF-PPS L5-S1 (11/15/18):  Preop LAURYN 44%  6wks                  40%  3mo                   5%  6mo                   6.67%  1yr                     27.5%  6yr                     28%      Neck Disability Index (NDI) Questionnaire         4/15/2025     8:34 AM   Neck Disability Index (NDI)   Neck Disability Index: Count 10   NDI: Total Score = SUM (points for all 10 findings) 25   Neck Disability in Percent = (Total Score) / 50 * 100 50 (%)      S/p 2-level ACDF C5-C7 (2/19/25):  NDI preop 31.12%  2mo  50%      Visual Analog Pain Scale  Back Pain Scale 0-10: 4 (low back pain)  Right leg pain: 0  Left leg pain: 0  Neck Pain Scale 0-10: 3 (left side neck pain)  Right arm pain: 0  Left arm pain: 0    PROMIS-10 Scores  Global Mental Health Score: (Patient-Rptd) (P) 8  Global Physical Health Score: (Patient-Rptd) (P) 10  PROMIS TOTAL - SUBSCORES: (Patient-Rptd) (P) 18    O>   Alert, oriented x 3, cooperative.  Not in CP distress.  There were no vitals taken for this visit.  Surgical incision(s) well-healed, no sign of infection.  Ambulates independently.  Grossly neurologically intact both upper extremities.  Motor and sensory intact.    Imaging:    Cervical AP lateral upright x-rays taken today show stable two-level ACDF with allograft spacers and anterior plate fixation C5-C7.  No sign of loosening or failure.  Overall, reassuring x-rays.    A>   44/m RHD with:  Cervical spine:  1.  2 mos s/p 2-level ACDF C5-C7 (2/19/25) for advanced spondylosis C5-C7 with Right C7 radiculopathy; with improved preoperative symptoms.  Lumbar spine:  1.  6.5 yrs s/p ALIF-PPS L5-S1 (11/15/18) with solid arthrodesis.  Other:  1.  Chronic active smoking (~ 1 ppd); still smoking as of 11/26/24; quit smoking prior to February 2025 cervical spine fusion surgery.    P>    Reassured patient regarding my clinical and radiographic findings.  I am happy to hear that his preoperative neck and right arm pain are improved.  I reassured him that at 6 weeks, he could still look forward to a lot more improvement.  I am also happy to hear that the incision has continued to heal, today, it is already deemed fully healed, no sign of infection.  At this point, we discussed the option of  postoperative PT; he is amenable.    - PT order placed.  - Work letter written and printed today.  Given patient's line of work, I do not think he is yet able to return to work at this time.  Will likely be cleared to return to work at his next visit.    Return to clinic in 6 weeks (3 months postoperative) c/o spine DEB clinic, with repeat cervical lateral x-ray.    Charlie Nixon MD    Orthopaedic Spine Surgery  Dept Orthopaedic Surgery, [facility]  [phone] office, [phone] pager  [email or web address]        Again, thank you for allowing me to participate in the care of your patient.        Sincerely,        Charlie Nixon MD    Electronically signed

## 2025-04-15 NOTE — LETTER
4/15/2025    Lee Mckenzie   1980        To Whom it May Concern;    Please excuse Lee Mckenzie from work from work for the next 7 weeks due to Cervical Spine surgery, he had healthcare visit on Apr 15, 2025.    Sincerely,        Charlie Nixon MD

## 2025-05-01 DIAGNOSIS — M47.22 CERVICAL SPONDYLOSIS WITH RADICULOPATHY: ICD-10-CM

## 2025-05-01 DIAGNOSIS — M54.12 CERVICAL RADICULOPATHY: ICD-10-CM

## 2025-05-01 DIAGNOSIS — Z98.1 S/P CERVICAL SPINAL FUSION: Primary | ICD-10-CM
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
## 2025-05-15 ENCOUNTER — ANCILLARY PROCEDURE (OUTPATIENT)
Dept: GENERAL RADIOLOGY | Facility: CLINIC | Age: 45
End: 2025-05-15
Attending: ORTHOPAEDIC SURGERY
Payer: COMMERCIAL

## 2025-05-15 ENCOUNTER — OFFICE VISIT (OUTPATIENT)
Dept: ORTHOPEDICS | Facility: CLINIC | Age: 45
End: 2025-05-15
Payer: COMMERCIAL

## 2025-05-15 DIAGNOSIS — Z98.1 S/P CERVICAL SPINAL FUSION: Primary | ICD-10-CM

## 2025-05-15 DIAGNOSIS — M54.12 CERVICAL RADICULOPATHY: ICD-10-CM

## 2025-05-15 DIAGNOSIS — M47.22 CERVICAL SPONDYLOSIS WITH RADICULOPATHY: ICD-10-CM

## 2025-05-15 DIAGNOSIS — Z98.1 S/P CERVICAL SPINAL FUSION: ICD-10-CM

## 2025-05-15 PROCEDURE — 72040 X-RAY EXAM NECK SPINE 2-3 VW: CPT | Mod: GC | Performed by: RADIOLOGY

## 2025-05-15 PROCEDURE — 99024 POSTOP FOLLOW-UP VISIT: CPT | Mod: GC | Performed by: ORTHOPAEDIC SURGERY
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
## 2025-05-15 NOTE — LETTER
May 15, 2025      Lee Mckenzie  [address] [facility] [address]        To Whom It May Concern:    Lee Mckenzie was seen in our clinic. He may return to work on Monday May 19, 2025 with the following:     - Start with 4-6 hour shifts for 2 weeks, then may increase to 8 hours as tolerated.        Sincerely,      Charlie Nixon      Electronically signed

## 2025-05-16 ENCOUNTER — TELEPHONE (OUTPATIENT)
Dept: ORTHOPEDICS | Facility: CLINIC | Age: 45
End: 2025-05-16
Payer: COMMERCIAL

## 2025-05-16 NOTE — TELEPHONE ENCOUNTER
Left Voicemail (1st Attempt) and Sent Mychart (1st Attempt) for the patient to call back and schedule the following:    Appointment type: Return Surgical Spine  Provider:   Return date: sometime in August of 2025  Specialty phone number: [phone]

## 2025-07-29 DIAGNOSIS — Z98.1 S/P CERVICAL SPINAL FUSION: Primary | ICD-10-CM

## 2025-08-07 ENCOUNTER — OFFICE VISIT (OUTPATIENT)
Dept: ORTHOPEDICS | Facility: CLINIC | Age: 45
End: 2025-08-07
Payer: COMMERCIAL

## 2025-08-07 DIAGNOSIS — M96.0 PSEUDARTHROSIS FOLLOWING SPINAL FUSION: Primary | ICD-10-CM

## 2025-08-07 DIAGNOSIS — Z98.1 S/P CERVICAL SPINAL FUSION: ICD-10-CM

## 2025-08-07 PROCEDURE — 99214 OFFICE O/P EST MOD 30 MIN: CPT | Mod: GC | Performed by: ORTHOPAEDIC SURGERY

## (undated) DEVICE — ESU ELEC NDL 1" E1552

## (undated) DEVICE — DRSG GAUZE 2X2" 8042

## (undated) DEVICE — GOWN XLG DISP 9545

## (undated) DEVICE — SU VICRYL 2-0 CT-1 27" UND J259H

## (undated) DEVICE — SU SILK 3-0 TIE 12X30" A304H

## (undated) DEVICE — SUTURE VICRYL+ 2-0 CT-2 CR 8X18" VCP726D

## (undated) DEVICE — LINEN TOWEL PACK X5 5464

## (undated) DEVICE — MARKER SPHERZ PACK 5

## (undated) DEVICE — DRAIN JACKSON PRATT 07FR ROUND SU130-1320

## (undated) DEVICE — BRUSH SURGICAL SCRUB W/4% CHLORHEXIDINE GLUCONATE SOL 4458A

## (undated) DEVICE — TOOL DISSECT MIDAS MR8 14CM MATCH HEAD 3MM MR8-14MH30

## (undated) DEVICE — DRAPE STERI TOWEL LG 1010

## (undated) DEVICE — Device

## (undated) DEVICE — POSITIONER ARMBOARD FOAM 1PAIR LF FP-ARMB1

## (undated) DEVICE — SYR 10ML LL W/O NDL 302995

## (undated) DEVICE — PACK SET-UP STD 9102

## (undated) DEVICE — BASIN SET MAJOR

## (undated) DEVICE — MIDAS REX DISSECTING TOOL  14MH30

## (undated) DEVICE — SYR 50ML LL W/O NDL 309653

## (undated) DEVICE — SOL ISOPROPYL RUBBING ALCOHOL USP 70% 4OZ HDX-20 I0020

## (undated) DEVICE — CLIP APPLIER 13" LG LIGACLIP MCL20

## (undated) DEVICE — DRAPE POUCH INSTRUMENT 1018

## (undated) DEVICE — KIT NAVIGATED PEDICLE ACCESS PACK NDL 9733498NAV

## (undated) DEVICE — SU VICRYL 0 CT-1 3X27" J430T

## (undated) DEVICE — NDL COUNTER 40CT  31142311

## (undated) DEVICE — DRAPE MICROSCOPE MINI LEICA AR8033652

## (undated) DEVICE — DRSG KERLIX FLUFFS X5

## (undated) DEVICE — CELL SAVER

## (undated) DEVICE — ADAPTER CATHETER ADDTO 2219

## (undated) DEVICE — PROTECTOR ARM ONE-STEP TRENDELENBURG 40418 (COI)

## (undated) DEVICE — ENDO DISSECTOR BLUNT 05MM  BTD05

## (undated) DEVICE — WIPES FOLEY CARE SURESTEP PROVON DFC100

## (undated) DEVICE — SOL NACL 0.9% IRRIG 1000ML BOTTLE 2F7124

## (undated) DEVICE — DRSG PRIMAPORE 03 1/8X6" 66000318

## (undated) DEVICE — RX SURGIFLO HEMOSTATIC MATRIX W/THROMBIN 8ML NEXTGEN 2993

## (undated) DEVICE — DRSG PRIMAPORE 02X3" 7133

## (undated) DEVICE — SPONGE LAP 18X18" X8435

## (undated) DEVICE — ESU ELEC BLADE 2.75" COATED/INSULATED E1455

## (undated) DEVICE — SPONGE SURGIFOAM 100 1974

## (undated) DEVICE — MITT SURGICAL PREP HAIR REMOVER LATEX FREE SPM100

## (undated) DEVICE — SYR 30ML LL W/O NDL 302832

## (undated) DEVICE — SU VICRYL 0 CT-1 CR 8X27" UND JJ41G

## (undated) DEVICE — ESU CORD BIPOLAR GREEN 10-4000

## (undated) DEVICE — PREP CHLORAPREP 26ML TINTED HI-LITE ORANGE 930815

## (undated) DEVICE — SU PDS II 0 CTX 60" Z990G

## (undated) DEVICE — VESSEL LOOPS RED MAXI

## (undated) DEVICE — BONE GRAFT HARVESTER DRILL  8MM BG-8010-S

## (undated) DEVICE — SU MONOCRYL 3-0 PS-2 18" UND Y497G

## (undated) DEVICE — SUCTION TIP YANKAUER STR K87

## (undated) DEVICE — PIN PERCUTANEOUS NAVIGATION FOR SPINE O-ARM150MM 9733236

## (undated) DEVICE — ESU GROUND PAD UNIVERSAL W/O CORD

## (undated) DEVICE — ESU PENCIL W/HOLSTER E2350H

## (undated) DEVICE — SUCTION MANIFOLD DORNOCH ULTRA CART UL-CL500

## (undated) DEVICE — GLOVE BIOGEL PI MICRO SZ 8.0 48580

## (undated) DEVICE — SU VICRYL 3-0 CT-1 36" J344H

## (undated) DEVICE — GLOVE PROTEXIS MICRO 6.5  2D73PM65

## (undated) DEVICE — SU VICRYL 3-0 SH 27" UND J416H

## (undated) DEVICE — DRAPE MICROSCOPE MICRO-KOVER LEICA 48"X120" 09-MK651

## (undated) DEVICE — SPECIMEN CONTAINER 5OZ STERILE 2600SA

## (undated) DEVICE — BLADE CLIPPER SGL USE 9680

## (undated) DEVICE — RX SURGIFLO HEMOSTATIC MATRIX W/THROMBIN 8ML 2994

## (undated) DEVICE — SU ETHILON 4-0 PS-2 18" BLACK 1667H

## (undated) DEVICE — DRAPE MAYO STAND 23X54 8337

## (undated) DEVICE — DRSG TEGADERM 4X4 3/4" 1626W

## (undated) DEVICE — ESU ELEC BLADE 6" COATED E1450-6

## (undated) DEVICE — BLADE KNIFE SURG 10 371110

## (undated) DEVICE — SOL WATER IRRIG 1000ML BOTTLE 2F7114

## (undated) DEVICE — NDL BONE MARROW BIOPSY SNARECOIL 11GAX4' RBN-114 74050-01M

## (undated) DEVICE — PREP CHLORAPREP 26ML TINTED ORANGE  260815

## (undated) DEVICE — LABEL MEDICATION SYSTEM 3303-P

## (undated) DEVICE — GLOVE PROTEXIS BLUE W/NEU-THERA 8.5  2D73EB85

## (undated) DEVICE — SPONGE COTTONOID 3/4X3/4" 80-1401

## (undated) DEVICE — BLADE CLIPPER DISP 4406

## (undated) DEVICE — GUIDEWIRE VASC MEDT 24" THRD BLUNT TIP 2345050

## (undated) DEVICE — DRAPE IOBAN INCISE 23X17" 6650EZ

## (undated) DEVICE — DRAPE LAP W/ARMBOARD 29410

## (undated) DEVICE — CLIP APPLIER 11" MED LIGACLIP MCM30

## (undated) DEVICE — SU MONOCRYL 4-0 PS-2 18" UND Y496G

## (undated) DEVICE — DRAPE C-ARM W/STRAPS 42X72" 07-CA104

## (undated) DEVICE — LINEN BACK PACK 5440

## (undated) DEVICE — DRSG DRAIN 4X4" 7086

## (undated) DEVICE — DRAIN JACKSON PRATT RESERVOIR 100ML SU130-1305

## (undated) DEVICE — GLOVE PROTEXIS MICRO 8.0  2D73PM80

## (undated) DEVICE — SU DERMABOND ADVANCED .7ML DNX12

## (undated) DEVICE — SU VICRYL 3-0 SH 27" J316H

## (undated) DEVICE — IMM COLLAR CERVICAL MED UNIVERSAL 2.5X24" 79-83520

## (undated) DEVICE — NDL SPINAL 18GA 3.5" 405184

## (undated) DEVICE — MIDAS REX DISSECTING TOOL  14BA50

## (undated) DEVICE — CATH TRAY FOLEY SURESTEP 16FR WDRAIN BAG STLK LATEX A300316A

## (undated) DEVICE — MIDAS REX DISSECTING TOOL 2.5MM  T12MH25

## (undated) DEVICE — SPONGE KITTNER 31001010

## (undated) DEVICE — DRAIN HEMOVAC RESERVOIR KIT 10FR 1/8" MED 00-2550-002-10

## (undated) DEVICE — DRAPE O ARM TUBE 9732722

## (undated) DEVICE — GLOVE BIOGEL PI MICRO INDICATOR UNDERGLOVE SZ 8.5 48985

## (undated) DEVICE — GLOVE PROTEXIS BLUE W/NEU-THERA 6.5  2D73EB65

## (undated) RX ORDER — IPRATROPIUM BROMIDE AND ALBUTEROL SULFATE 2.5; .5 MG/3ML; MG/3ML
SOLUTION RESPIRATORY (INHALATION)
Status: DISPENSED
Start: 2025-02-19

## (undated) RX ORDER — HYDROMORPHONE HYDROCHLORIDE 1 MG/ML
INJECTION, SOLUTION INTRAMUSCULAR; INTRAVENOUS; SUBCUTANEOUS
Status: DISPENSED
Start: 2025-02-19

## (undated) RX ORDER — FENTANYL CITRATE 50 UG/ML
INJECTION, SOLUTION INTRAMUSCULAR; INTRAVENOUS
Status: DISPENSED
Start: 2018-11-05

## (undated) RX ORDER — FENTANYL CITRATE 50 UG/ML
INJECTION, SOLUTION INTRAMUSCULAR; INTRAVENOUS
Status: DISPENSED
Start: 2025-02-19

## (undated) RX ORDER — HYDROMORPHONE HYDROCHLORIDE 1 MG/ML
INJECTION, SOLUTION INTRAMUSCULAR; INTRAVENOUS; SUBCUTANEOUS
Status: DISPENSED
Start: 2018-11-05

## (undated) RX ORDER — ACETAMINOPHEN 325 MG/1
TABLET ORAL
Status: DISPENSED
Start: 2018-11-05

## (undated) RX ORDER — ACETAMINOPHEN 325 MG/1
TABLET ORAL
Status: DISPENSED
Start: 2025-02-19

## (undated) RX ORDER — PROPOFOL 10 MG/ML
INJECTION, EMULSION INTRAVENOUS
Status: DISPENSED
Start: 2018-11-05

## (undated) RX ORDER — CEFAZOLIN SODIUM 1 G/3ML
INJECTION, POWDER, FOR SOLUTION INTRAMUSCULAR; INTRAVENOUS
Status: DISPENSED
Start: 2018-11-05

## (undated) RX ORDER — LIDOCAINE HYDROCHLORIDE 10 MG/ML
INJECTION, SOLUTION EPIDURAL; INFILTRATION; INTRACAUDAL; PERINEURAL
Status: DISPENSED
Start: 2024-11-18

## (undated) RX ORDER — BUPIVACAINE HYDROCHLORIDE AND EPINEPHRINE 2.5; 5 MG/ML; UG/ML
INJECTION, SOLUTION EPIDURAL; INFILTRATION; INTRACAUDAL; PERINEURAL
Status: DISPENSED
Start: 2018-11-05

## (undated) RX ORDER — DEXAMETHASONE SODIUM PHOSPHATE 10 MG/ML
INJECTION, SOLUTION INTRAMUSCULAR; INTRAVENOUS
Status: DISPENSED
Start: 2024-11-18

## (undated) RX ORDER — HYDROXYZINE HYDROCHLORIDE 25 MG/1
TABLET, FILM COATED ORAL
Status: DISPENSED
Start: 2018-11-05

## (undated) RX ORDER — GABAPENTIN 300 MG/1
CAPSULE ORAL
Status: DISPENSED
Start: 2025-02-19

## (undated) RX ORDER — LIDOCAINE HYDROCHLORIDE 20 MG/ML
INJECTION, SOLUTION EPIDURAL; INFILTRATION; INTRACAUDAL; PERINEURAL
Status: DISPENSED
Start: 2018-11-05

## (undated) RX ORDER — CEFAZOLIN SODIUM 2 G/100ML
INJECTION, SOLUTION INTRAVENOUS
Status: DISPENSED
Start: 2018-11-05

## (undated) RX ORDER — ONDANSETRON 2 MG/ML
INJECTION INTRAMUSCULAR; INTRAVENOUS
Status: DISPENSED
Start: 2018-11-05

## (undated) RX ORDER — KETOROLAC TROMETHAMINE 30 MG/ML
INJECTION, SOLUTION INTRAMUSCULAR; INTRAVENOUS
Status: DISPENSED
Start: 2018-11-05

## (undated) RX ORDER — DEXAMETHASONE SODIUM PHOSPHATE 4 MG/ML
INJECTION, SOLUTION INTRA-ARTICULAR; INTRALESIONAL; INTRAMUSCULAR; INTRAVENOUS; SOFT TISSUE
Status: DISPENSED
Start: 2018-11-05